# Patient Record
Sex: MALE | Race: WHITE | NOT HISPANIC OR LATINO | URBAN - METROPOLITAN AREA
[De-identification: names, ages, dates, MRNs, and addresses within clinical notes are randomized per-mention and may not be internally consistent; named-entity substitution may affect disease eponyms.]

---

## 2017-01-01 ENCOUNTER — COMMUNICATION - HEALTHEAST (OUTPATIENT)
Dept: FAMILY MEDICINE | Facility: CLINIC | Age: 82
End: 2017-01-01

## 2017-01-01 DIAGNOSIS — I10 ESSENTIAL HYPERTENSION, BENIGN: ICD-10-CM

## 2017-01-06 ENCOUNTER — RECORDS - HEALTHEAST (OUTPATIENT)
Dept: ADMINISTRATIVE | Facility: OTHER | Age: 82
End: 2017-01-06

## 2017-01-25 ENCOUNTER — COMMUNICATION - HEALTHEAST (OUTPATIENT)
Dept: FAMILY MEDICINE | Facility: CLINIC | Age: 82
End: 2017-01-25

## 2017-01-25 DIAGNOSIS — E78.5 HYPERLIPIDEMIA: ICD-10-CM

## 2017-03-17 ENCOUNTER — AMBULATORY - HEALTHEAST (OUTPATIENT)
Dept: FAMILY MEDICINE | Facility: CLINIC | Age: 82
End: 2017-03-17

## 2017-03-17 ENCOUNTER — OFFICE VISIT - HEALTHEAST (OUTPATIENT)
Dept: FAMILY MEDICINE | Facility: CLINIC | Age: 82
End: 2017-03-17

## 2017-03-17 DIAGNOSIS — R01.1 HEART MURMUR: ICD-10-CM

## 2017-03-17 DIAGNOSIS — Z00.00 ROUTINE GENERAL MEDICAL EXAMINATION AT A HEALTH CARE FACILITY: ICD-10-CM

## 2017-03-17 DIAGNOSIS — E11.9 DIABETES MELLITUS (H): ICD-10-CM

## 2017-03-17 DIAGNOSIS — R09.89 RESPIRATORY CRACKLES AT LEFT LUNG BASE: ICD-10-CM

## 2017-03-17 DIAGNOSIS — I10 ESSENTIAL HYPERTENSION, BENIGN: ICD-10-CM

## 2017-03-17 DIAGNOSIS — M40.202 CERVICAL KYPHOSIS: ICD-10-CM

## 2017-03-17 DIAGNOSIS — E78.5 HYPERLIPIDEMIA: ICD-10-CM

## 2017-03-17 LAB
CHOLEST SERPL-MCNC: 149 MG/DL
FASTING STATUS PATIENT QL REPORTED: YES
HBA1C MFR BLD: 7.3 % (ref 3.5–6)
HDLC SERPL-MCNC: 34 MG/DL
LDLC SERPL CALC-MCNC: 96 MG/DL
TRIGL SERPL-MCNC: 95 MG/DL

## 2017-03-17 ASSESSMENT — MIFFLIN-ST. JEOR: SCORE: 1374.28

## 2017-03-18 ENCOUNTER — AMBULATORY - HEALTHEAST (OUTPATIENT)
Dept: FAMILY MEDICINE | Facility: CLINIC | Age: 82
End: 2017-03-18

## 2017-03-18 ENCOUNTER — COMMUNICATION - HEALTHEAST (OUTPATIENT)
Dept: FAMILY MEDICINE | Facility: CLINIC | Age: 82
End: 2017-03-18

## 2017-03-18 DIAGNOSIS — F32.A DEPRESSION: ICD-10-CM

## 2017-03-18 DIAGNOSIS — I10 ESSENTIAL HYPERTENSION, BENIGN: ICD-10-CM

## 2017-03-21 ENCOUNTER — COMMUNICATION - HEALTHEAST (OUTPATIENT)
Dept: FAMILY MEDICINE | Facility: CLINIC | Age: 82
End: 2017-03-21

## 2017-03-29 ENCOUNTER — COMMUNICATION - HEALTHEAST (OUTPATIENT)
Dept: FAMILY MEDICINE | Facility: CLINIC | Age: 82
End: 2017-03-29

## 2017-03-29 ENCOUNTER — HOSPITAL ENCOUNTER (OUTPATIENT)
Dept: CARDIOLOGY | Facility: CLINIC | Age: 82
Discharge: HOME | End: 2017-03-29
Attending: FAMILY MEDICINE

## 2017-03-29 DIAGNOSIS — R01.1 HEART MURMUR: ICD-10-CM

## 2017-03-29 LAB
AORTIC ROOT: 3.3 CM
AORTIC VALVE MEAN VELOCITY: 185 CM/S
AV DIMENSIONLESS INDEX VTI: 0.4
AV MEAN GRADIENT: 16 MMHG
AV PEAK GRADIENT: 29.4 MMHG
AV VALVE AREA: 1.4 CM2
AV VELOCITY RATIO: 0.3
BSA FOR ECHO PROCEDURE: 1.87 M2
CV BLOOD PRESSURE: NORMAL MMHG
CV ECHO HEIGHT: 73 IN
CV ECHO WEIGHT: 150 LBS
DOP CALC AO PEAK VEL: 271 CM/S
DOP CALC AO VTI: 71.3 CM
DOP CALC LVOT AREA: 3.8 CM2
DOP CALC LVOT DIAMETER: 2.2 CM
DOP CALC LVOT PEAK VEL: 93.1 CM/S
DOP CALC LVOT STROKE VOLUME: 102.6 CM3
DOP CALCLVOT PEAK VEL VTI: 27 CM
ECHO EJECTION FRACTION ESTIMATED: 55 %
EJECTION FRACTION: 50 % (ref 55–75)
FRACTIONAL SHORTENING: 40.6 % (ref 28–44)
INTERVENTRICULAR SEPTUM IN END DIASTOLE: 1.24 CM (ref 0.6–1)
IVS/PW RATIO: 1
LA AREA 1: 31 CM2
LA AREA 2: 16.7 CM2
LEFT ATRIUM LENGTH: 4.86 CM
LEFT ATRIUM SIZE: 2.9 CM
LEFT ATRIUM VOLUME INDEX: 48.4 ML/M2
LEFT ATRIUM VOLUME: 90.5 CM3
LEFT VENTRICLE CARDIAC INDEX: 3.3 L/MIN/M2
LEFT VENTRICLE CARDIAC OUTPUT: 6.2 L/MIN
LEFT VENTRICLE DIASTOLIC VOLUME INDEX: 39.6 CM3/M2 (ref 34–74)
LEFT VENTRICLE DIASTOLIC VOLUME: 74 CM3 (ref 62–150)
LEFT VENTRICLE HEART RATE: 60 BPM
LEFT VENTRICLE MASS INDEX: 113.6 G/M2
LEFT VENTRICLE SYSTOLIC VOLUME INDEX: 19.8 CM3/M2 (ref 11–31)
LEFT VENTRICLE SYSTOLIC VOLUME: 37 CM3 (ref 21–61)
LEFT VENTRICULAR INTERNAL DIMENSION IN DIASTOLE: 4.48 CM (ref 4.2–5.8)
LEFT VENTRICULAR INTERNAL DIMENSION IN SYSTOLE: 2.66 CM (ref 2.5–4)
LEFT VENTRICULAR MASS: 212.4 G
LEFT VENTRICULAR OUTFLOW TRACT MEAN GRADIENT: 2 MMHG
LEFT VENTRICULAR OUTFLOW TRACT MEAN VELOCITY: 64.4 CM/S
LEFT VENTRICULAR OUTFLOW TRACT PEAK GRADIENT: 3 MMHG
LEFT VENTRICULAR POSTERIOR WALL IN END DIASTOLE: 1.29 CM (ref 0.6–1)
LV STROKE VOLUME INDEX: 54.9 ML/M2
MITRAL VALVE E/A RATIO: 0.8
MV AVERAGE E/E' RATIO: 20.5 CM/S
MV DECELERATION TIME: 408 MS
MV E'TISSUE VEL-LAT: 6.82 CM/S
MV E'TISSUE VEL-MED: 3.12 CM/S
MV LATERAL E/E' RATIO: 15
MV MEDIAL E/E' RATIO: 32.7
MV PEAK A VELOCITY: 128 CM/S
MV PEAK E VELOCITY: 102 CM/S
NUC REST DIASTOLIC VOLUME INDEX: 2400 LBS
NUC REST SYSTOLIC VOLUME INDEX: 73 IN
TRICUSPID REGURGITATION PEAK PRESSURE GRADIENT: 31.8 MMHG
TRICUSPID VALVE PEAK REGURGITANT VELOCITY: 282 CM/S

## 2017-03-29 ASSESSMENT — MIFFLIN-ST. JEOR: SCORE: 1374.28

## 2017-03-30 ENCOUNTER — COMMUNICATION - HEALTHEAST (OUTPATIENT)
Dept: FAMILY MEDICINE | Facility: CLINIC | Age: 82
End: 2017-03-30

## 2017-04-23 ENCOUNTER — COMMUNICATION - HEALTHEAST (OUTPATIENT)
Dept: FAMILY MEDICINE | Facility: CLINIC | Age: 82
End: 2017-04-23

## 2017-04-23 DIAGNOSIS — E78.5 HYPERLIPIDEMIA: ICD-10-CM

## 2017-05-18 ENCOUNTER — COMMUNICATION - HEALTHEAST (OUTPATIENT)
Dept: FAMILY MEDICINE | Facility: CLINIC | Age: 82
End: 2017-05-18

## 2017-06-13 ENCOUNTER — OFFICE VISIT - HEALTHEAST (OUTPATIENT)
Dept: FAMILY MEDICINE | Facility: CLINIC | Age: 82
End: 2017-06-13

## 2017-06-13 DIAGNOSIS — E11.42 DIABETIC PERIPHERAL NEUROPATHY ASSOCIATED WITH TYPE 2 DIABETES MELLITUS (H): ICD-10-CM

## 2017-06-13 DIAGNOSIS — E78.5 HYPERLIPIDEMIA: ICD-10-CM

## 2017-06-13 DIAGNOSIS — M40.202 CERVICAL KYPHOSIS: ICD-10-CM

## 2017-06-13 DIAGNOSIS — I10 ESSENTIAL HYPERTENSION, BENIGN: ICD-10-CM

## 2017-06-13 DIAGNOSIS — E11.9 DIABETES MELLITUS (H): ICD-10-CM

## 2017-06-13 LAB
CHOLEST SERPL-MCNC: 148 MG/DL
HBA1C MFR BLD: 7.2 % (ref 3.5–6)
LDLC SERPL CALC-MCNC: 94 MG/DL

## 2017-06-14 ENCOUNTER — COMMUNICATION - HEALTHEAST (OUTPATIENT)
Dept: FAMILY MEDICINE | Facility: CLINIC | Age: 82
End: 2017-06-14

## 2017-07-04 ENCOUNTER — COMMUNICATION - HEALTHEAST (OUTPATIENT)
Dept: FAMILY MEDICINE | Facility: CLINIC | Age: 82
End: 2017-07-04

## 2017-07-04 DIAGNOSIS — I10 ESSENTIAL HYPERTENSION, BENIGN: ICD-10-CM

## 2017-07-11 ENCOUNTER — COMMUNICATION - HEALTHEAST (OUTPATIENT)
Dept: FAMILY MEDICINE | Facility: CLINIC | Age: 82
End: 2017-07-11

## 2017-07-11 DIAGNOSIS — I10 BENIGN ESSENTIAL HYPERTENSION: ICD-10-CM

## 2017-07-28 ENCOUNTER — COMMUNICATION - HEALTHEAST (OUTPATIENT)
Dept: FAMILY MEDICINE | Facility: CLINIC | Age: 82
End: 2017-07-28

## 2017-08-11 ENCOUNTER — TRANSFERRED RECORDS (OUTPATIENT)
Dept: HEALTH INFORMATION MANAGEMENT | Facility: CLINIC | Age: 82
End: 2017-08-11

## 2017-08-14 ENCOUNTER — COMMUNICATION - HEALTHEAST (OUTPATIENT)
Dept: FAMILY MEDICINE | Facility: CLINIC | Age: 82
End: 2017-08-14

## 2017-08-14 DIAGNOSIS — G62.9 NEUROPATHY: ICD-10-CM

## 2017-09-05 ENCOUNTER — RECORDS - HEALTHEAST (OUTPATIENT)
Dept: ADMINISTRATIVE | Facility: OTHER | Age: 82
End: 2017-09-05

## 2017-09-05 ENCOUNTER — COMMUNICATION - HEALTHEAST (OUTPATIENT)
Dept: FAMILY MEDICINE | Facility: CLINIC | Age: 82
End: 2017-09-05

## 2017-09-14 ENCOUNTER — RECORDS - HEALTHEAST (OUTPATIENT)
Dept: ADMINISTRATIVE | Facility: OTHER | Age: 82
End: 2017-09-14

## 2017-09-19 ENCOUNTER — RECORDS - HEALTHEAST (OUTPATIENT)
Dept: ADMINISTRATIVE | Facility: OTHER | Age: 82
End: 2017-09-19

## 2017-09-25 ENCOUNTER — COMMUNICATION - HEALTHEAST (OUTPATIENT)
Dept: FAMILY MEDICINE | Facility: CLINIC | Age: 82
End: 2017-09-25

## 2017-09-25 DIAGNOSIS — E11.9 TYPE 2 DIABETES MELLITUS (H): ICD-10-CM

## 2017-09-26 ENCOUNTER — COMMUNICATION - HEALTHEAST (OUTPATIENT)
Dept: FAMILY MEDICINE | Facility: CLINIC | Age: 82
End: 2017-09-26

## 2017-09-26 DIAGNOSIS — E11.9 TYPE 2 DIABETES MELLITUS (H): ICD-10-CM

## 2017-09-27 ENCOUNTER — OFFICE VISIT - HEALTHEAST (OUTPATIENT)
Dept: FAMILY MEDICINE | Facility: CLINIC | Age: 82
End: 2017-09-27

## 2017-09-27 DIAGNOSIS — E78.5 HYPERLIPIDEMIA: ICD-10-CM

## 2017-09-27 DIAGNOSIS — E11.42 DIABETIC PERIPHERAL NEUROPATHY ASSOCIATED WITH TYPE 2 DIABETES MELLITUS (H): ICD-10-CM

## 2017-09-27 DIAGNOSIS — E11.9 DIABETES MELLITUS (H): ICD-10-CM

## 2017-09-27 DIAGNOSIS — I10 ESSENTIAL HYPERTENSION, BENIGN: ICD-10-CM

## 2017-09-27 LAB
CHOLEST SERPL-MCNC: 162 MG/DL
FASTING STATUS PATIENT QL REPORTED: YES
HBA1C MFR BLD: 6.5 % (ref 3.5–6)
HDLC SERPL-MCNC: 32 MG/DL
LDLC SERPL CALC-MCNC: 104 MG/DL
TRIGL SERPL-MCNC: 128 MG/DL

## 2017-09-27 ASSESSMENT — MIFFLIN-ST. JEOR: SCORE: 1317.57

## 2017-10-03 ENCOUNTER — RECORDS - HEALTHEAST (OUTPATIENT)
Dept: ADMINISTRATIVE | Facility: OTHER | Age: 82
End: 2017-10-03

## 2017-11-12 ENCOUNTER — COMMUNICATION - HEALTHEAST (OUTPATIENT)
Dept: FAMILY MEDICINE | Facility: CLINIC | Age: 82
End: 2017-11-12

## 2017-11-13 ENCOUNTER — COMMUNICATION - HEALTHEAST (OUTPATIENT)
Dept: FAMILY MEDICINE | Facility: CLINIC | Age: 82
End: 2017-11-13

## 2017-11-13 DIAGNOSIS — G62.9 NEUROPATHY: ICD-10-CM

## 2017-11-24 ENCOUNTER — COMMUNICATION - HEALTHEAST (OUTPATIENT)
Dept: FAMILY MEDICINE | Facility: CLINIC | Age: 82
End: 2017-11-24

## 2017-11-24 DIAGNOSIS — I10 ESSENTIAL HYPERTENSION, BENIGN: ICD-10-CM

## 2017-12-16 ENCOUNTER — COMMUNICATION - HEALTHEAST (OUTPATIENT)
Dept: FAMILY MEDICINE | Facility: CLINIC | Age: 82
End: 2017-12-16

## 2017-12-16 DIAGNOSIS — I10 ESSENTIAL HYPERTENSION, BENIGN: ICD-10-CM

## 2017-12-21 ENCOUNTER — RECORDS - HEALTHEAST (OUTPATIENT)
Dept: ADMINISTRATIVE | Facility: OTHER | Age: 82
End: 2017-12-21

## 2017-12-21 ENCOUNTER — COMMUNICATION - HEALTHEAST (OUTPATIENT)
Dept: FAMILY MEDICINE | Facility: CLINIC | Age: 82
End: 2017-12-21

## 2017-12-21 DIAGNOSIS — E11.9 DIABETES (H): ICD-10-CM

## 2017-12-27 ENCOUNTER — COMMUNICATION - HEALTHEAST (OUTPATIENT)
Dept: FAMILY MEDICINE | Facility: CLINIC | Age: 82
End: 2017-12-27

## 2017-12-27 DIAGNOSIS — I10 HTN (HYPERTENSION): ICD-10-CM

## 2018-01-17 ENCOUNTER — COMMUNICATION - HEALTHEAST (OUTPATIENT)
Dept: FAMILY MEDICINE | Facility: CLINIC | Age: 83
End: 2018-01-17

## 2018-01-17 DIAGNOSIS — E78.5 HYPERLIPIDEMIA: ICD-10-CM

## 2018-02-01 ENCOUNTER — OFFICE VISIT - HEALTHEAST (OUTPATIENT)
Dept: FAMILY MEDICINE | Facility: CLINIC | Age: 83
End: 2018-02-01

## 2018-02-01 DIAGNOSIS — E78.5 HYPERLIPIDEMIA: ICD-10-CM

## 2018-02-01 DIAGNOSIS — I35.0 AORTIC STENOSIS: ICD-10-CM

## 2018-02-01 DIAGNOSIS — E11.9 DIABETES MELLITUS (H): ICD-10-CM

## 2018-02-01 DIAGNOSIS — I10 ESSENTIAL HYPERTENSION, BENIGN: ICD-10-CM

## 2018-02-01 LAB
ALBUMIN SERPL-MCNC: 3.5 G/DL (ref 3.5–5)
ALP SERPL-CCNC: 90 U/L (ref 45–120)
ALT SERPL W P-5'-P-CCNC: 11 U/L (ref 0–45)
ANION GAP SERPL CALCULATED.3IONS-SCNC: 12 MMOL/L (ref 5–18)
AST SERPL W P-5'-P-CCNC: 19 U/L (ref 0–40)
BILIRUB DIRECT SERPL-MCNC: 0.2 MG/DL
BILIRUB SERPL-MCNC: 0.6 MG/DL (ref 0–1)
BUN SERPL-MCNC: 15 MG/DL (ref 8–28)
CALCIUM SERPL-MCNC: 9.6 MG/DL (ref 8.5–10.5)
CHLORIDE BLD-SCNC: 103 MMOL/L (ref 98–107)
CHOLEST SERPL-MCNC: 157 MG/DL
CO2 SERPL-SCNC: 27 MMOL/L (ref 22–31)
CREAT SERPL-MCNC: 0.83 MG/DL (ref 0.7–1.3)
FASTING STATUS PATIENT QL REPORTED: YES
GFR SERPL CREATININE-BSD FRML MDRD: >60 ML/MIN/1.73M2
GLUCOSE BLD-MCNC: 117 MG/DL (ref 70–125)
HBA1C MFR BLD: 7.5 % (ref 3.5–6)
HDLC SERPL-MCNC: 32 MG/DL
LDLC SERPL CALC-MCNC: 100 MG/DL
POTASSIUM BLD-SCNC: 3.9 MMOL/L (ref 3.5–5)
PROT SERPL-MCNC: 6.7 G/DL (ref 6–8)
SODIUM SERPL-SCNC: 142 MMOL/L (ref 136–145)
TRIGL SERPL-MCNC: 125 MG/DL

## 2018-02-01 ASSESSMENT — MIFFLIN-ST. JEOR: SCORE: 1297.17

## 2018-02-02 ENCOUNTER — COMMUNICATION - HEALTHEAST (OUTPATIENT)
Dept: FAMILY MEDICINE | Facility: CLINIC | Age: 83
End: 2018-02-02

## 2018-02-09 ENCOUNTER — COMMUNICATION - HEALTHEAST (OUTPATIENT)
Dept: FAMILY MEDICINE | Facility: CLINIC | Age: 83
End: 2018-02-09

## 2018-02-09 DIAGNOSIS — G62.9 NEUROPATHY: ICD-10-CM

## 2018-02-20 ENCOUNTER — COMMUNICATION - HEALTHEAST (OUTPATIENT)
Dept: FAMILY MEDICINE | Facility: CLINIC | Age: 83
End: 2018-02-20

## 2018-02-20 DIAGNOSIS — I10 ESSENTIAL HYPERTENSION, BENIGN: ICD-10-CM

## 2018-02-22 ENCOUNTER — RECORDS - HEALTHEAST (OUTPATIENT)
Dept: ADMINISTRATIVE | Facility: OTHER | Age: 83
End: 2018-02-22

## 2018-02-22 ENCOUNTER — TRANSFERRED RECORDS (OUTPATIENT)
Dept: HEALTH INFORMATION MANAGEMENT | Facility: CLINIC | Age: 83
End: 2018-02-22

## 2018-02-28 ENCOUNTER — COMMUNICATION - HEALTHEAST (OUTPATIENT)
Dept: FAMILY MEDICINE | Facility: CLINIC | Age: 83
End: 2018-02-28

## 2018-02-28 DIAGNOSIS — I10 ESSENTIAL HYPERTENSION, BENIGN: ICD-10-CM

## 2018-03-01 ENCOUNTER — RECORDS - HEALTHEAST (OUTPATIENT)
Dept: ADMINISTRATIVE | Facility: OTHER | Age: 83
End: 2018-03-01

## 2018-03-01 ENCOUNTER — TRANSFERRED RECORDS (OUTPATIENT)
Dept: HEALTH INFORMATION MANAGEMENT | Facility: CLINIC | Age: 83
End: 2018-03-01

## 2018-03-02 ENCOUNTER — TRANSFERRED RECORDS (OUTPATIENT)
Dept: HEALTH INFORMATION MANAGEMENT | Facility: CLINIC | Age: 83
End: 2018-03-02

## 2018-03-02 ENCOUNTER — TELEPHONE (OUTPATIENT)
Dept: OPHTHALMOLOGY | Facility: CLINIC | Age: 83
End: 2018-03-02

## 2018-03-02 NOTE — TELEPHONE ENCOUNTER
[facility] referring for urgent evaluation  Notes faxed today    Message to glaucoma team for review of notes once received and help with scheduling    Angelo Izaguirre RN 4:23 PM 03/02/18

## 2018-03-03 ENCOUNTER — COMMUNICATION - HEALTHEAST (OUTPATIENT)
Dept: FAMILY MEDICINE | Facility: CLINIC | Age: 83
End: 2018-03-03

## 2018-03-03 DIAGNOSIS — F32.A DEPRESSION: ICD-10-CM

## 2018-03-08 ENCOUNTER — OFFICE VISIT (OUTPATIENT)
Dept: OPHTHALMOLOGY | Facility: CLINIC | Age: 83
End: 2018-03-08
Attending: OPHTHALMOLOGY
Payer: COMMERCIAL

## 2018-03-08 DIAGNOSIS — H40.1112 PRIMARY OPEN ANGLE GLAUCOMA OF RIGHT EYE, MODERATE STAGE: ICD-10-CM

## 2018-03-08 DIAGNOSIS — H25.12 NUCLEAR SENILE CATARACT OF LEFT EYE: ICD-10-CM

## 2018-03-08 DIAGNOSIS — H40.1190 POAG (PRIMARY OPEN-ANGLE GLAUCOMA): ICD-10-CM

## 2018-03-08 DIAGNOSIS — Z96.1 PSEUDOPHAKIA OF RIGHT EYE: ICD-10-CM

## 2018-03-08 DIAGNOSIS — H40.52X3 NEOVASCULAR GLAUCOMA OF LEFT EYE, SEVERE STAGE: Primary | ICD-10-CM

## 2018-03-08 PROCEDURE — 76519 ECHO EXAM OF EYE: CPT | Mod: ZF | Performed by: OPHTHALMOLOGY

## 2018-03-08 PROCEDURE — G0463 HOSPITAL OUTPT CLINIC VISIT: HCPCS | Mod: ZF

## 2018-03-08 PROCEDURE — 92133 CPTRZD OPH DX IMG PST SGM ON: CPT | Mod: ZF | Performed by: OPHTHALMOLOGY

## 2018-03-08 PROCEDURE — 92083 EXTENDED VISUAL FIELD XM: CPT | Mod: ZF | Performed by: OPHTHALMOLOGY

## 2018-03-08 RX ORDER — BRIMONIDINE TARTRATE 1 MG/ML
1 SOLUTION/ DROPS OPHTHALMIC 3 TIMES DAILY
COMMUNITY

## 2018-03-08 RX ORDER — PAROXETINE HYDROCHLORIDE HEMIHYDRATE 25 MG/1
TABLET, FILM COATED, EXTENDED RELEASE ORAL EVERY MORNING
COMMUNITY

## 2018-03-08 RX ORDER — TIMOLOL MALEATE 5 MG/ML
1 SOLUTION OPHTHALMIC EVERY MORNING
COMMUNITY

## 2018-03-08 RX ORDER — LATANOPROST 50 UG/ML
1 SOLUTION/ DROPS OPHTHALMIC AT BEDTIME
COMMUNITY

## 2018-03-08 RX ORDER — HYDROCHLOROTHIAZIDE 12.5 MG/1
12.5 CAPSULE ORAL DAILY
COMMUNITY

## 2018-03-08 ASSESSMENT — CONF VISUAL FIELD
OS_SUPERIOR_TEMPORAL_RESTRICTION: 1
OS_INFERIOR_TEMPORAL_RESTRICTION: 1
OS_INFERIOR_NASAL_RESTRICTION: 1
OS_SUPERIOR_NASAL_RESTRICTION: 1

## 2018-03-08 ASSESSMENT — VISUAL ACUITY
OS_CC: LP
OD_CC: 20/30
METHOD: SNELLEN - LINEAR

## 2018-03-08 ASSESSMENT — REFRACTION_MANIFEST
OD_CYLINDER: +1.25
OD_SPHERE: -1.25
OD_AXIS: 130

## 2018-03-08 ASSESSMENT — TONOMETRY
IOP_METHOD: APPLANATION
OD_IOP_MMHG: 18
OS_IOP_MMHG: 24

## 2018-03-08 ASSESSMENT — REFRACTION_WEARINGRX
OS_ADD: +2.75
OD_SPHERE: -1.00
OD_CYLINDER: +1.00
OD_AXIS: 180
OS_AXIS: 016
OS_SPHERE: -0.75
OD_ADD: +2.75
OS_CYLINDER: +1.50

## 2018-03-08 ASSESSMENT — SLIT LAMP EXAM - LIDS
COMMENTS: NORMAL
COMMENTS: NORMAL

## 2018-03-08 ASSESSMENT — CUP TO DISC RATIO: OD_RATIO: 0.9

## 2018-03-08 NOTE — PROGRESS NOTES
1)NVG OS/POAG OD --  K pachy:    Tmax:     HVF: OD:?early sup arcaute and Nasal dec sens on first field  And OS:unable     CDR: OD:0.9     HRT/OCT: OD:Mild RNFL thinning       FHX of Glc: Brother -- possible tube surgery, on gtts       Gonio:       Intolerant to: Diamox -- balance issues (self d/c in 2018, unable to tolerate)      Asthma/COPD: No, tolerating topical and po BB   Steroid Use: No, s/p Ozrudex in past    Kidney Stones:  No   Sulfa Allergy: No, UNABLE TO TOLERATE DIAMOX     IOP targets: -- IOP borderline/above target OS -- consider observation, mCPC OS, or phaco/tube OS -- rec phaco/tube OS  2)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 Omer note from [facility] (rec CE OS to improve view and management of retinal condition)  3)DM c Mod NPDR -- following at [facility]  4)PCIOL OD -- following with Dr. Garcia at [facility] eye clinic   5)Mature NS OS -- will require Trypan and possible hooks -- +Florid NVI, ?regressed -- ?visual potential -- will need old notes    MD:pt turns 89 yo next week and wants to defer surgery until after his birthday -- daughter accompanied him to office visit       Will obtain old notes from [facility] eye clinic and [facility] and old visual field tests.  Patient will continue on Timolol which is a yellow top drop in the morning in the LEFT EYE ONLY and Latanoprost which is a teal top drop at bedtime in BOTH EYES, and Alphagan (Brimonidine) which is a purple top drop 3x/day (8 hours apart) in the LEFT EYE ONLY.  A long discussion of the risks, benefits, and alternatives including potential treatment and management options were had with patient and a decision was made to think about options prior to proceeding with cataract and tube surgery in the left eye.      If patient declines surgery he will return to clinic in 1-2 months with pachymetry, gonioscopy, B scan left eye only, repeat IOP check and repeat discussion about possible combined cataract and tube surgery on the  left eye.    Attending Physician Attestation:  Complete documentation of historical and exam elements from today's encounter can be found in the full encounter summary report (not reduplicated in this progress note). I personally obtained the chief complaint(s) and history of present illness.  I confirmed and edited as necessary the review of systems, past medical/surgical history, family history, social history, and examination findings as documented by others; and I examined the patient myself. I personally reviewed the relevant tests, images, and reports as documented above. I formulated and edited as necessary the assessment and plan and discussed the findings and management plan with the patient and family.  - Irma Burgos MD

## 2018-03-08 NOTE — PATIENT INSTRUCTIONS
Will obtain old notes from [facility] eye clinic and VRS and old visual field tests.  Patient will continue on Timolol which is a yellow top drop in the morning in the LEFT EYE ONLY and Latanoprost which is a teal top drop at bedtime in BOTH EYES, and Alphagan (Brimonidine) which is a purple top drop 3x/day (8 hours apart) in the LEFT EYE ONLY.  A long discussion of the risks, benefits, and alternatives including potential treatment and management options were had with patient and a decision was made to think about options prior to proceeding with cataract and tube surgery in the left eye.      If patient declines surgery he will return to clinic in 1-2 months with B scan and repeat IOP check.

## 2018-03-08 NOTE — LETTER
March 8, 2018      Shane Garcia MD  [facility]  [address], [address]  [place] [address]  Fax: [phone]       RE: Wicho Zhao  [address] [facility] [address]       Dear Dr. Garcia:     Thank you for referring your patient, Wicho Zhao, to the EYE CLINIC at [facility]. Please see a copy of my visit note below.    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 Omer note from [facility] (rec CE OS to improve view and management of retinal condition)  3)DM c Mod NPDR -- following at [facility]  4)PCIOL OD -- following with Dr. Garcia at [facility] eye clinic   5)Mature NS OS -- will require Trypan and possible hooks -- +Florid NVI, ?regressed -- ?visual potential -- will need old notes    Patient turns 89 yo next week and wants to defer surgery until after his birthday -- daughter accompanied him to office visit.           Plan:  Will obtain old notes from [facility] eye clinic and [facility] and old visual field tests.  Patient will continue on Timolol which is a yellow top drop in the morning in the LEFT EYE ONLY and Latanoprost which is a teal top drop at bedtime in BOTH EYES, and Alphagan (Brimonidine) which is a purple top drop 3x/day (8 hours apart) in the LEFT EYE ONLY.  A long discussion of the risks, benefits, and alternatives including potential treatment and  management options were had with patient and a decision was made to think about options prior to proceeding with cataract and tube surgery in the left eye.      If patient declines surgery he will return to clinic in 1-2 months with pachymetry, gonioscopy, B scan left eye only, repeat IOP check and repeat discussion about possible combined cataract and tube surgery on the left eye.      Again, thank you for allowing me to participate in the care of your patient.        Sincerely,      Irma Burgos MD

## 2018-03-08 NOTE — NURSING NOTE
Chief Complaints and History of Present Illnesses   Patient presents with     Consult For     Glaucoma      HPI    Additional Referring Providers:  Dr. Radha SAM [facility]   Symptoms:     Blurred vision   Decreased vision      Unknown duration    Frequency:  Constant       Do you have eye pain now?:  No      Comments:  Pt sent here at request of Dr. Radha SAM [facility]. Pt has longstanding history of Glaucoma diagnosed 10 years ago. Pt told he needed to have IOL LE along with Glaucoma surgery. Notes that he does have Avastin Injection LE from  at [facility] for old CRVO. BE feeling good and comfortable. MELI KAPADIA, COA 10:29 AM 03/08/2018

## 2018-03-08 NOTE — MR AVS SNAPSHOT
After Visit Summary   3/8/2018    Wicho Zhao    MRN: [id number]           Patient Information     Date Of Birth          3/18/1927        Visit Information        Provider Department      3/8/2018 10:00 AM Irma Burgos MD Eye Clinic        Today's Diagnoses     Neovascular glaucoma of left eye, severe stage    -  1    POAG (primary open-angle glaucoma)        Primary open angle glaucoma of right eye, moderate stage        Pseudophakia of right eye        Nuclear senile cataract of left eye          Care Instructions    Will obtain old notes from [facility] eye clinic and VRS and old visual field tests.  Patient will continue on Timolol which is a yellow top drop in the morning in the LEFT EYE ONLY and Latanoprost which is a teal top drop at bedtime in BOTH EYES, and Alphagan (Brimonidine) which is a purple top drop 3x/day (8 hours apart) in the LEFT EYE ONLY.  A long discussion of the risks, benefits, and alternatives including potential treatment and management options were had with patient and a decision was made to think about options prior to proceeding with cataract and tube surgery in the left eye.      If patient declines surgery he will return to clinic in 1-2 months with B scan and repeat IOP check.          Follow-ups after your visit        Follow-up notes from your care team     Return 1-2 months with B scan and repeat IOP check..      Who to contact     Please call your clinic at [phone] to:    Ask questions about your health    Make or cancel appointments    Discuss your medicines    Learn about your test results    Speak to your doctor            Additional Information About Your Visit        Glarityhart Information     Privy is an electronic gateway that provides easy, online access to your medical records. With Privy, you can request a clinic appointment, read your test results, renew a prescription or communicate with your care team.     To sign up for Privy visit the  website at [email or web address]   You will be asked to enter the access code listed below, as well as some personal information. Please follow the directions to create your username and password.     Your access code is: [email or web address]  Expires: 2018  6:30 AM     Your access code will  in 90 days. If you need help or a new code, please contact your [facility] or call [phone] for assistance.        Care EveryWhere ID     This is your Care EveryWhere ID. This could be used by other organizations to access your [place] medical records  [id number]         Blood Pressure from Last 3 Encounters:   No data found for BP    Weight from Last 3 Encounters:   No data found for Wt              We Performed the Following     IOL Biometry w/ IOL calc OU (both eye)     OCT Optic Nerve RNFL Spectralis OU (both eyes)     OVF 24-2 Dynamic OD        Primary Care Provider Office Phone # Fax #    Parrish RYAN Ricardo [phone]       [facility] [address] [place] [address] [address] [place] [address]        Equal Access to Services     TYREL [facility] CenterAINSLEY : Hadii aad ku hadasho Soomaali, waaxda luqadaha, qaybta kaalmada adewojciech, deepali evans . So [facility] [phone].    ATENCIÓN: Si habla español, tiene a joaquin disposición servicios gratuitos de asistencia lingüística. Loretoame al [phone].    We comply with applicable federal civil rights laws and [place] laws. We do not discriminate on the basis of race, color, national origin, age, disability, sex, sexual orientation, or gender identity.            Thank you!     Thank you for choosing EYE CLINIC  for your care. Our goal is always to provide you with excellent care. Hearing back from our patients is one way we can continue to improve our services. Please take a few minutes to complete the written survey that you may receive in the mail after your visit with us. Thank you!             Your Updated  Medication List - Protect others around you: Learn how to safely use, store and throw away your medicines at www.disposemymeds.org.          This list is accurate as of 3/8/18 12:57 PM.  Always use your most recent med list.                   Brand Name Dispense Instructions for use Diagnosis    brimonidine 0.1 % ophthalmic solution    ALPHAGAN P     1 drop 3 times daily        GABAPENTIN PO           GLIPIZIDE PO           hydrochlorothiazide 12.5 MG capsule    MICROZIDE     Take 12.5 mg by mouth daily        latanoprost 0.005 % ophthalmic solution    XALATAN     1 drop At Bedtime        LISINOPRIL PO           LOSARTAN POTASSIUM PO           METOPROLOL TARTRATE PO           PARoxetine 25 MG 24 hr tablet    PAXIL-CR     Take by mouth every morning        POTASSIUM CITRATE PO           timolol 0.5 % ophthalmic gel-form    TIMOPTIC-XE     1 drop every morning        VERAPAMIL HCL PO

## 2018-03-15 ENCOUNTER — RECORDS - HEALTHEAST (OUTPATIENT)
Dept: ADMINISTRATIVE | Facility: OTHER | Age: 83
End: 2018-03-15

## 2018-03-15 ENCOUNTER — TRANSFERRED RECORDS (OUTPATIENT)
Dept: HEALTH INFORMATION MANAGEMENT | Facility: CLINIC | Age: 83
End: 2018-03-15

## 2018-03-23 ENCOUNTER — COMMUNICATION - HEALTHEAST (OUTPATIENT)
Dept: FAMILY MEDICINE | Facility: CLINIC | Age: 83
End: 2018-03-23

## 2018-03-23 DIAGNOSIS — I10 ESSENTIAL HYPERTENSION, BENIGN: ICD-10-CM

## 2018-03-30 ENCOUNTER — COMMUNICATION - HEALTHEAST (OUTPATIENT)
Dept: FAMILY MEDICINE | Facility: CLINIC | Age: 83
End: 2018-03-30

## 2018-03-30 DIAGNOSIS — E11.9 TYPE 2 DIABETES MELLITUS (H): ICD-10-CM

## 2018-04-05 ENCOUNTER — COMMUNICATION - HEALTHEAST (OUTPATIENT)
Dept: FAMILY MEDICINE | Facility: CLINIC | Age: 83
End: 2018-04-05

## 2018-04-05 DIAGNOSIS — I10 HTN (HYPERTENSION): ICD-10-CM

## 2018-06-18 ENCOUNTER — COMMUNICATION - HEALTHEAST (OUTPATIENT)
Dept: FAMILY MEDICINE | Facility: CLINIC | Age: 83
End: 2018-06-18

## 2018-06-18 DIAGNOSIS — I10 ESSENTIAL HYPERTENSION, BENIGN: ICD-10-CM

## 2018-06-20 ENCOUNTER — OFFICE VISIT - HEALTHEAST (OUTPATIENT)
Dept: FAMILY MEDICINE | Facility: CLINIC | Age: 83
End: 2018-06-20

## 2018-06-20 DIAGNOSIS — I10 ESSENTIAL HYPERTENSION, BENIGN: ICD-10-CM

## 2018-06-20 DIAGNOSIS — E78.5 HYPERLIPIDEMIA: ICD-10-CM

## 2018-06-20 DIAGNOSIS — F33.9 MAJOR DEPRESSIVE DISORDER, RECURRENT EPISODE (H): ICD-10-CM

## 2018-06-20 DIAGNOSIS — E11.42 DIABETIC PERIPHERAL NEUROPATHY ASSOCIATED WITH TYPE 2 DIABETES MELLITUS (H): ICD-10-CM

## 2018-06-20 DIAGNOSIS — E11.9 DIABETES MELLITUS (H): ICD-10-CM

## 2018-06-20 LAB
ALBUMIN SERPL-MCNC: 3.5 G/DL (ref 3.5–5)
ALP SERPL-CCNC: 82 U/L (ref 45–120)
ALT SERPL W P-5'-P-CCNC: 12 U/L (ref 0–45)
ANION GAP SERPL CALCULATED.3IONS-SCNC: 14 MMOL/L (ref 5–18)
AST SERPL W P-5'-P-CCNC: 18 U/L (ref 0–40)
BILIRUB DIRECT SERPL-MCNC: 0.2 MG/DL
BILIRUB SERPL-MCNC: 0.4 MG/DL (ref 0–1)
BUN SERPL-MCNC: 16 MG/DL (ref 8–28)
CALCIUM SERPL-MCNC: 9.8 MG/DL (ref 8.5–10.5)
CHLORIDE BLD-SCNC: 103 MMOL/L (ref 98–107)
CHOLEST SERPL-MCNC: 159 MG/DL
CO2 SERPL-SCNC: 26 MMOL/L (ref 22–31)
CREAT SERPL-MCNC: 0.89 MG/DL (ref 0.7–1.3)
FASTING STATUS PATIENT QL REPORTED: YES
GFR SERPL CREATININE-BSD FRML MDRD: >60 ML/MIN/1.73M2
GLUCOSE BLD-MCNC: 116 MG/DL (ref 70–125)
HBA1C MFR BLD: 7.5 % (ref 3.5–6)
HDLC SERPL-MCNC: 30 MG/DL
LDLC SERPL CALC-MCNC: 105 MG/DL
POTASSIUM BLD-SCNC: 3.8 MMOL/L (ref 3.5–5)
PROT SERPL-MCNC: 6.7 G/DL (ref 6–8)
SODIUM SERPL-SCNC: 143 MMOL/L (ref 136–145)
TRIGL SERPL-MCNC: 118 MG/DL

## 2018-06-20 ASSESSMENT — MIFFLIN-ST. JEOR: SCORE: 1307.37

## 2018-06-23 ENCOUNTER — COMMUNICATION - HEALTHEAST (OUTPATIENT)
Dept: FAMILY MEDICINE | Facility: CLINIC | Age: 83
End: 2018-06-23

## 2018-06-23 DIAGNOSIS — E78.5 HYPERLIPIDEMIA: ICD-10-CM

## 2018-06-25 ENCOUNTER — OFFICE VISIT - HEALTHEAST (OUTPATIENT)
Dept: FAMILY MEDICINE | Facility: CLINIC | Age: 83
End: 2018-06-25

## 2018-06-25 DIAGNOSIS — S01.311D EAR LOBE LACERATION, RIGHT, SUBSEQUENT ENCOUNTER: ICD-10-CM

## 2018-06-25 ASSESSMENT — MIFFLIN-ST. JEOR: SCORE: 1302.83

## 2018-06-27 ENCOUNTER — COMMUNICATION - HEALTHEAST (OUTPATIENT)
Dept: FAMILY MEDICINE | Facility: CLINIC | Age: 83
End: 2018-06-27

## 2018-06-27 DIAGNOSIS — E11.9 TYPE 2 DIABETES MELLITUS (H): ICD-10-CM

## 2018-06-27 DIAGNOSIS — I10 BENIGN ESSENTIAL HYPERTENSION: ICD-10-CM

## 2018-06-28 ENCOUNTER — OFFICE VISIT - HEALTHEAST (OUTPATIENT)
Dept: FAMILY MEDICINE | Facility: CLINIC | Age: 83
End: 2018-06-28

## 2018-06-28 DIAGNOSIS — Z51.89 VISIT FOR WOUND CHECK: ICD-10-CM

## 2018-06-28 DIAGNOSIS — Z48.02 VISIT FOR SUTURE REMOVAL: ICD-10-CM

## 2018-07-02 ENCOUNTER — OFFICE VISIT - HEALTHEAST (OUTPATIENT)
Dept: FAMILY MEDICINE | Facility: CLINIC | Age: 83
End: 2018-07-02

## 2018-07-02 DIAGNOSIS — Z48.02 VISIT FOR SUTURE REMOVAL: ICD-10-CM

## 2018-07-02 DIAGNOSIS — Z51.89 VISIT FOR WOUND CHECK: ICD-10-CM

## 2018-07-11 ENCOUNTER — OFFICE VISIT - HEALTHEAST (OUTPATIENT)
Dept: FAMILY MEDICINE | Facility: CLINIC | Age: 83
End: 2018-07-11

## 2018-07-11 DIAGNOSIS — Z51.89 VISIT FOR WOUND CHECK: ICD-10-CM

## 2018-07-11 DIAGNOSIS — E11.9 DIABETES MELLITUS (H): ICD-10-CM

## 2018-07-11 DIAGNOSIS — F33.9 MAJOR DEPRESSIVE DISORDER, RECURRENT EPISODE (H): ICD-10-CM

## 2018-08-01 ENCOUNTER — OFFICE VISIT - HEALTHEAST (OUTPATIENT)
Dept: FAMILY MEDICINE | Facility: CLINIC | Age: 83
End: 2018-08-01

## 2018-08-01 DIAGNOSIS — F33.9 MAJOR DEPRESSIVE DISORDER, RECURRENT EPISODE (H): ICD-10-CM

## 2018-08-31 ENCOUNTER — COMMUNICATION - HEALTHEAST (OUTPATIENT)
Dept: FAMILY MEDICINE | Facility: CLINIC | Age: 83
End: 2018-08-31

## 2018-08-31 DIAGNOSIS — F32.A DEPRESSION: ICD-10-CM

## 2018-09-06 ENCOUNTER — COMMUNICATION - HEALTHEAST (OUTPATIENT)
Dept: FAMILY MEDICINE | Facility: CLINIC | Age: 83
End: 2018-09-06

## 2018-09-06 DIAGNOSIS — I10 ESSENTIAL HYPERTENSION, BENIGN: ICD-10-CM

## 2018-09-24 ENCOUNTER — COMMUNICATION - HEALTHEAST (OUTPATIENT)
Dept: FAMILY MEDICINE | Facility: CLINIC | Age: 83
End: 2018-09-24

## 2018-09-24 DIAGNOSIS — E11.9 TYPE 2 DIABETES MELLITUS (H): ICD-10-CM

## 2018-10-16 ENCOUNTER — COMMUNICATION - HEALTHEAST (OUTPATIENT)
Dept: FAMILY MEDICINE | Facility: CLINIC | Age: 83
End: 2018-10-16

## 2018-10-17 ENCOUNTER — OFFICE VISIT - HEALTHEAST (OUTPATIENT)
Dept: FAMILY MEDICINE | Facility: CLINIC | Age: 83
End: 2018-10-17

## 2018-10-17 DIAGNOSIS — I10 ESSENTIAL HYPERTENSION, BENIGN: ICD-10-CM

## 2018-10-17 DIAGNOSIS — E11.9 DIABETES (H): ICD-10-CM

## 2018-10-17 DIAGNOSIS — E11.9 DIABETES MELLITUS (H): ICD-10-CM

## 2018-10-17 DIAGNOSIS — G62.9 NEUROPATHY: ICD-10-CM

## 2018-10-17 DIAGNOSIS — E78.5 HYPERLIPIDEMIA: ICD-10-CM

## 2018-10-17 DIAGNOSIS — E11.9 TYPE 2 DIABETES MELLITUS (H): ICD-10-CM

## 2018-10-17 LAB
ALBUMIN SERPL-MCNC: 3.4 G/DL (ref 3.5–5)
ALP SERPL-CCNC: 94 U/L (ref 45–120)
ALT SERPL W P-5'-P-CCNC: 9 U/L (ref 0–45)
ANION GAP SERPL CALCULATED.3IONS-SCNC: 10 MMOL/L (ref 5–18)
AST SERPL W P-5'-P-CCNC: 16 U/L (ref 0–40)
BILIRUB DIRECT SERPL-MCNC: 0.2 MG/DL
BILIRUB SERPL-MCNC: 0.5 MG/DL (ref 0–1)
BUN SERPL-MCNC: 11 MG/DL (ref 8–28)
CALCIUM SERPL-MCNC: 9.5 MG/DL (ref 8.5–10.5)
CHLORIDE BLD-SCNC: 102 MMOL/L (ref 98–107)
CHOLEST SERPL-MCNC: 152 MG/DL
CO2 SERPL-SCNC: 30 MMOL/L (ref 22–31)
CREAT SERPL-MCNC: 0.89 MG/DL (ref 0.7–1.3)
FASTING STATUS PATIENT QL REPORTED: YES
GFR SERPL CREATININE-BSD FRML MDRD: >60 ML/MIN/1.73M2
GLUCOSE BLD-MCNC: 118 MG/DL (ref 70–125)
HBA1C MFR BLD: 7.8 % (ref 3.5–6)
HDLC SERPL-MCNC: 29 MG/DL
LDLC SERPL CALC-MCNC: 97 MG/DL
POTASSIUM BLD-SCNC: 4 MMOL/L (ref 3.5–5)
PROT SERPL-MCNC: 6.3 G/DL (ref 6–8)
SODIUM SERPL-SCNC: 142 MMOL/L (ref 136–145)
TRIGL SERPL-MCNC: 130 MG/DL

## 2019-01-17 ENCOUNTER — OFFICE VISIT - HEALTHEAST (OUTPATIENT)
Dept: FAMILY MEDICINE | Facility: CLINIC | Age: 84
End: 2019-01-17

## 2019-01-17 DIAGNOSIS — I10 ESSENTIAL HYPERTENSION, BENIGN: ICD-10-CM

## 2019-01-17 DIAGNOSIS — E11.42 DIABETIC PERIPHERAL NEUROPATHY ASSOCIATED WITH TYPE 2 DIABETES MELLITUS (H): ICD-10-CM

## 2019-01-17 DIAGNOSIS — E78.2 MIXED HYPERLIPIDEMIA: ICD-10-CM

## 2019-01-17 LAB
ALBUMIN SERPL-MCNC: 3.8 G/DL (ref 3.5–5)
ALP SERPL-CCNC: 75 U/L (ref 45–120)
ALT SERPL W P-5'-P-CCNC: 10 U/L (ref 0–45)
ANION GAP SERPL CALCULATED.3IONS-SCNC: 10 MMOL/L (ref 5–18)
AST SERPL W P-5'-P-CCNC: 16 U/L (ref 0–40)
BILIRUB DIRECT SERPL-MCNC: 0.2 MG/DL
BILIRUB SERPL-MCNC: 0.5 MG/DL (ref 0–1)
BUN SERPL-MCNC: 16 MG/DL (ref 8–28)
CALCIUM SERPL-MCNC: 9.7 MG/DL (ref 8.5–10.5)
CHLORIDE BLD-SCNC: 103 MMOL/L (ref 98–107)
CHOLEST SERPL-MCNC: 155 MG/DL
CO2 SERPL-SCNC: 29 MMOL/L (ref 22–31)
CREAT SERPL-MCNC: 1.04 MG/DL (ref 0.7–1.3)
FASTING STATUS PATIENT QL REPORTED: YES
GFR SERPL CREATININE-BSD FRML MDRD: >60 ML/MIN/1.73M2
GLUCOSE BLD-MCNC: 118 MG/DL (ref 70–125)
HBA1C MFR BLD: 7.5 % (ref 3.5–6)
HDLC SERPL-MCNC: 34 MG/DL
LDLC SERPL CALC-MCNC: 98 MG/DL
POTASSIUM BLD-SCNC: 4.1 MMOL/L (ref 3.5–5)
PROT SERPL-MCNC: 6.8 G/DL (ref 6–8)
SODIUM SERPL-SCNC: 142 MMOL/L (ref 136–145)
TRIGL SERPL-MCNC: 113 MG/DL

## 2019-03-03 ENCOUNTER — COMMUNICATION - HEALTHEAST (OUTPATIENT)
Dept: FAMILY MEDICINE | Facility: CLINIC | Age: 84
End: 2019-03-03

## 2019-03-03 DIAGNOSIS — I10 ESSENTIAL HYPERTENSION, BENIGN: ICD-10-CM

## 2019-03-10 ENCOUNTER — COMMUNICATION - HEALTHEAST (OUTPATIENT)
Dept: FAMILY MEDICINE | Facility: CLINIC | Age: 84
End: 2019-03-10

## 2019-03-10 DIAGNOSIS — I10 ESSENTIAL HYPERTENSION, BENIGN: ICD-10-CM

## 2019-04-10 ENCOUNTER — COMMUNICATION - HEALTHEAST (OUTPATIENT)
Dept: FAMILY MEDICINE | Facility: CLINIC | Age: 84
End: 2019-04-10

## 2019-04-10 DIAGNOSIS — I10 HTN (HYPERTENSION): ICD-10-CM

## 2019-04-18 ENCOUNTER — COMMUNICATION - HEALTHEAST (OUTPATIENT)
Dept: FAMILY MEDICINE | Facility: CLINIC | Age: 84
End: 2019-04-18

## 2019-04-18 DIAGNOSIS — F33.0 MILD EPISODE OF RECURRENT MAJOR DEPRESSIVE DISORDER (H): ICD-10-CM

## 2019-04-29 ENCOUNTER — COMMUNICATION - HEALTHEAST (OUTPATIENT)
Dept: FAMILY MEDICINE | Facility: CLINIC | Age: 84
End: 2019-04-29

## 2019-04-29 DIAGNOSIS — I10 ESSENTIAL HYPERTENSION, BENIGN: ICD-10-CM

## 2019-05-01 ENCOUNTER — OFFICE VISIT - HEALTHEAST (OUTPATIENT)
Dept: FAMILY MEDICINE | Facility: CLINIC | Age: 84
End: 2019-05-01

## 2019-05-01 DIAGNOSIS — E11.40 TYPE 2 DIABETES MELLITUS WITH DIABETIC NEUROPATHY, WITHOUT LONG-TERM CURRENT USE OF INSULIN (H): ICD-10-CM

## 2019-05-01 DIAGNOSIS — E78.2 MIXED HYPERLIPIDEMIA: ICD-10-CM

## 2019-05-01 DIAGNOSIS — I10 ESSENTIAL HYPERTENSION, BENIGN: ICD-10-CM

## 2019-05-01 LAB
ALBUMIN SERPL-MCNC: 3.8 G/DL (ref 3.5–5)
ALP SERPL-CCNC: 69 U/L (ref 45–120)
ALT SERPL W P-5'-P-CCNC: 13 U/L (ref 0–45)
ANION GAP SERPL CALCULATED.3IONS-SCNC: 14 MMOL/L (ref 5–18)
AST SERPL W P-5'-P-CCNC: 17 U/L (ref 0–40)
BILIRUB DIRECT SERPL-MCNC: 0.2 MG/DL
BILIRUB SERPL-MCNC: 0.4 MG/DL (ref 0–1)
BUN SERPL-MCNC: 19 MG/DL (ref 8–28)
CALCIUM SERPL-MCNC: 10.2 MG/DL (ref 8.5–10.5)
CHLORIDE BLD-SCNC: 105 MMOL/L (ref 98–107)
CHOLEST SERPL-MCNC: 152 MG/DL
CO2 SERPL-SCNC: 24 MMOL/L (ref 22–31)
CREAT SERPL-MCNC: 1.11 MG/DL (ref 0.7–1.3)
FASTING STATUS PATIENT QL REPORTED: YES
GFR SERPL CREATININE-BSD FRML MDRD: >60 ML/MIN/1.73M2
GLUCOSE BLD-MCNC: 106 MG/DL (ref 70–125)
HBA1C MFR BLD: 7.1 % (ref 3.5–6)
HDLC SERPL-MCNC: 31 MG/DL
LDLC SERPL CALC-MCNC: 98 MG/DL
POTASSIUM BLD-SCNC: 4 MMOL/L (ref 3.5–5)
PROT SERPL-MCNC: 6.7 G/DL (ref 6–8)
SODIUM SERPL-SCNC: 143 MMOL/L (ref 136–145)
TRIGL SERPL-MCNC: 114 MG/DL

## 2019-05-01 ASSESSMENT — MIFFLIN-ST. JEOR: SCORE: 1320.98

## 2019-05-23 ENCOUNTER — COMMUNICATION - HEALTHEAST (OUTPATIENT)
Dept: FAMILY MEDICINE | Facility: CLINIC | Age: 84
End: 2019-05-23

## 2019-05-23 DIAGNOSIS — E11.40 TYPE 2 DIABETES MELLITUS WITH DIABETIC NEUROPATHY, WITHOUT LONG-TERM CURRENT USE OF INSULIN (H): ICD-10-CM

## 2019-05-24 ENCOUNTER — COMMUNICATION - HEALTHEAST (OUTPATIENT)
Dept: FAMILY MEDICINE | Facility: CLINIC | Age: 84
End: 2019-05-24

## 2019-05-24 DIAGNOSIS — I10 ESSENTIAL HYPERTENSION, BENIGN: ICD-10-CM

## 2019-05-29 ENCOUNTER — COMMUNICATION - HEALTHEAST (OUTPATIENT)
Dept: FAMILY MEDICINE | Facility: CLINIC | Age: 84
End: 2019-05-29

## 2019-06-16 ENCOUNTER — COMMUNICATION - HEALTHEAST (OUTPATIENT)
Dept: FAMILY MEDICINE | Facility: CLINIC | Age: 84
End: 2019-06-16

## 2019-06-16 DIAGNOSIS — I10 BENIGN ESSENTIAL HYPERTENSION: ICD-10-CM

## 2019-06-16 DIAGNOSIS — E78.5 HYPERLIPIDEMIA: ICD-10-CM

## 2019-08-09 ENCOUNTER — COMMUNICATION - HEALTHEAST (OUTPATIENT)
Dept: FAMILY MEDICINE | Facility: CLINIC | Age: 84
End: 2019-08-09

## 2019-08-09 DIAGNOSIS — G62.9 NEUROPATHY: ICD-10-CM

## 2019-08-21 ENCOUNTER — OFFICE VISIT - HEALTHEAST (OUTPATIENT)
Dept: FAMILY MEDICINE | Facility: CLINIC | Age: 84
End: 2019-08-21

## 2019-08-21 DIAGNOSIS — E78.2 MIXED HYPERLIPIDEMIA: ICD-10-CM

## 2019-08-21 DIAGNOSIS — I10 ESSENTIAL HYPERTENSION, BENIGN: ICD-10-CM

## 2019-08-21 DIAGNOSIS — E11.42 DIABETIC PERIPHERAL NEUROPATHY ASSOCIATED WITH TYPE 2 DIABETES MELLITUS (H): ICD-10-CM

## 2019-08-21 DIAGNOSIS — F32.5 MAJOR DEPRESSION IN COMPLETE REMISSION (H): ICD-10-CM

## 2019-08-21 DIAGNOSIS — E11.40 TYPE 2 DIABETES MELLITUS WITH DIABETIC NEUROPATHY, WITHOUT LONG-TERM CURRENT USE OF INSULIN (H): ICD-10-CM

## 2019-08-21 LAB
ALBUMIN SERPL-MCNC: 3.9 G/DL (ref 3.5–5)
ALP SERPL-CCNC: 83 U/L (ref 45–120)
ALT SERPL W P-5'-P-CCNC: 10 U/L (ref 0–45)
ANION GAP SERPL CALCULATED.3IONS-SCNC: 11 MMOL/L (ref 5–18)
AST SERPL W P-5'-P-CCNC: 17 U/L (ref 0–40)
BILIRUB DIRECT SERPL-MCNC: 0.2 MG/DL
BILIRUB SERPL-MCNC: 0.4 MG/DL (ref 0–1)
BUN SERPL-MCNC: 14 MG/DL (ref 8–28)
CALCIUM SERPL-MCNC: 10 MG/DL (ref 8.5–10.5)
CHLORIDE BLD-SCNC: 103 MMOL/L (ref 98–107)
CHOLEST SERPL-MCNC: 177 MG/DL
CO2 SERPL-SCNC: 28 MMOL/L (ref 22–31)
CREAT SERPL-MCNC: 1.36 MG/DL (ref 0.7–1.3)
FASTING STATUS PATIENT QL REPORTED: YES
GFR SERPL CREATININE-BSD FRML MDRD: 49 ML/MIN/1.73M2
GLUCOSE BLD-MCNC: 132 MG/DL (ref 70–125)
HBA1C MFR BLD: 6.7 % (ref 3.5–6)
HDLC SERPL-MCNC: 31 MG/DL
LDLC SERPL CALC-MCNC: 117 MG/DL
POTASSIUM BLD-SCNC: 4.1 MMOL/L (ref 3.5–5)
PROT SERPL-MCNC: 6.8 G/DL (ref 6–8)
SODIUM SERPL-SCNC: 142 MMOL/L (ref 136–145)
TRIGL SERPL-MCNC: 144 MG/DL

## 2019-08-21 ASSESSMENT — MIFFLIN-ST. JEOR: SCORE: 1303.97

## 2019-08-23 ENCOUNTER — COMMUNICATION - HEALTHEAST (OUTPATIENT)
Dept: FAMILY MEDICINE | Facility: CLINIC | Age: 84
End: 2019-08-23

## 2019-08-23 DIAGNOSIS — I10 ESSENTIAL HYPERTENSION, BENIGN: ICD-10-CM

## 2019-09-11 ENCOUNTER — COMMUNICATION - HEALTHEAST (OUTPATIENT)
Dept: FAMILY MEDICINE | Facility: CLINIC | Age: 84
End: 2019-09-11

## 2019-09-11 DIAGNOSIS — E11.40 TYPE 2 DIABETES MELLITUS WITH DIABETIC NEUROPATHY, WITHOUT LONG-TERM CURRENT USE OF INSULIN (H): ICD-10-CM

## 2019-09-13 ENCOUNTER — COMMUNICATION - HEALTHEAST (OUTPATIENT)
Dept: FAMILY MEDICINE | Facility: CLINIC | Age: 84
End: 2019-09-13

## 2019-09-13 DIAGNOSIS — E11.40 TYPE 2 DIABETES MELLITUS WITH DIABETIC NEUROPATHY, WITHOUT LONG-TERM CURRENT USE OF INSULIN (H): ICD-10-CM

## 2019-10-07 ENCOUNTER — COMMUNICATION - HEALTHEAST (OUTPATIENT)
Dept: FAMILY MEDICINE | Facility: CLINIC | Age: 84
End: 2019-10-07

## 2019-10-07 DIAGNOSIS — E11.40 TYPE 2 DIABETES MELLITUS WITH DIABETIC NEUROPATHY, WITHOUT LONG-TERM CURRENT USE OF INSULIN (H): ICD-10-CM

## 2019-11-11 ENCOUNTER — COMMUNICATION - HEALTHEAST (OUTPATIENT)
Dept: FAMILY MEDICINE | Facility: CLINIC | Age: 84
End: 2019-11-11

## 2019-11-11 DIAGNOSIS — E11.9 DIABETES (H): ICD-10-CM

## 2019-11-24 ENCOUNTER — COMMUNICATION - HEALTHEAST (OUTPATIENT)
Dept: FAMILY MEDICINE | Facility: CLINIC | Age: 84
End: 2019-11-24

## 2019-11-24 DIAGNOSIS — I10 ESSENTIAL HYPERTENSION, BENIGN: ICD-10-CM

## 2019-11-30 ENCOUNTER — COMMUNICATION - HEALTHEAST (OUTPATIENT)
Dept: FAMILY MEDICINE | Facility: CLINIC | Age: 84
End: 2019-11-30

## 2019-11-30 DIAGNOSIS — E11.40 TYPE 2 DIABETES MELLITUS WITH DIABETIC NEUROPATHY, WITHOUT LONG-TERM CURRENT USE OF INSULIN (H): ICD-10-CM

## 2019-12-15 ENCOUNTER — COMMUNICATION - HEALTHEAST (OUTPATIENT)
Dept: FAMILY MEDICINE | Facility: CLINIC | Age: 84
End: 2019-12-15

## 2019-12-15 DIAGNOSIS — I10 ESSENTIAL HYPERTENSION, BENIGN: ICD-10-CM

## 2019-12-18 ENCOUNTER — OFFICE VISIT - HEALTHEAST (OUTPATIENT)
Dept: FAMILY MEDICINE | Facility: CLINIC | Age: 84
End: 2019-12-18

## 2019-12-18 DIAGNOSIS — I10 ESSENTIAL HYPERTENSION, BENIGN: ICD-10-CM

## 2019-12-18 DIAGNOSIS — E11.40 TYPE 2 DIABETES MELLITUS WITH DIABETIC NEUROPATHY, WITHOUT LONG-TERM CURRENT USE OF INSULIN (H): ICD-10-CM

## 2019-12-18 DIAGNOSIS — E78.2 MIXED HYPERLIPIDEMIA: ICD-10-CM

## 2019-12-18 DIAGNOSIS — I35.0 AORTIC VALVE STENOSIS, ETIOLOGY OF CARDIAC VALVE DISEASE UNSPECIFIED: ICD-10-CM

## 2019-12-18 DIAGNOSIS — E11.42 DIABETIC PERIPHERAL NEUROPATHY ASSOCIATED WITH TYPE 2 DIABETES MELLITUS (H): ICD-10-CM

## 2019-12-18 LAB
ALBUMIN SERPL-MCNC: 3.7 G/DL (ref 3.5–5)
ALP SERPL-CCNC: 80 U/L (ref 45–120)
ALT SERPL W P-5'-P-CCNC: 11 U/L (ref 0–45)
ANION GAP SERPL CALCULATED.3IONS-SCNC: 10 MMOL/L (ref 5–18)
AST SERPL W P-5'-P-CCNC: 16 U/L (ref 0–40)
BILIRUB DIRECT SERPL-MCNC: 0.1 MG/DL
BILIRUB SERPL-MCNC: 0.4 MG/DL (ref 0–1)
BUN SERPL-MCNC: 19 MG/DL (ref 8–28)
CALCIUM SERPL-MCNC: 9.5 MG/DL (ref 8.5–10.5)
CHLORIDE BLD-SCNC: 105 MMOL/L (ref 98–107)
CHOLEST SERPL-MCNC: 153 MG/DL
CO2 SERPL-SCNC: 28 MMOL/L (ref 22–31)
CREAT SERPL-MCNC: 1.44 MG/DL (ref 0.7–1.3)
FASTING STATUS PATIENT QL REPORTED: YES
GFR SERPL CREATININE-BSD FRML MDRD: 46 ML/MIN/1.73M2
GLUCOSE BLD-MCNC: 153 MG/DL (ref 70–125)
HBA1C MFR BLD: 6.6 % (ref 3.5–6)
HDLC SERPL-MCNC: 30 MG/DL
LDLC SERPL CALC-MCNC: 99 MG/DL
POTASSIUM BLD-SCNC: 4.4 MMOL/L (ref 3.5–5)
PROT SERPL-MCNC: 6.5 G/DL (ref 6–8)
SODIUM SERPL-SCNC: 143 MMOL/L (ref 136–145)
TRIGL SERPL-MCNC: 121 MG/DL

## 2019-12-18 ASSESSMENT — MIFFLIN-ST. JEOR: SCORE: 1319.84

## 2019-12-23 ENCOUNTER — COMMUNICATION - HEALTHEAST (OUTPATIENT)
Dept: FAMILY MEDICINE | Facility: CLINIC | Age: 84
End: 2019-12-23

## 2019-12-23 DIAGNOSIS — I10 HTN (HYPERTENSION): ICD-10-CM

## 2019-12-28 ENCOUNTER — COMMUNICATION - HEALTHEAST (OUTPATIENT)
Dept: FAMILY MEDICINE | Facility: CLINIC | Age: 84
End: 2019-12-28

## 2019-12-28 DIAGNOSIS — E11.40 TYPE 2 DIABETES MELLITUS WITH DIABETIC NEUROPATHY, WITHOUT LONG-TERM CURRENT USE OF INSULIN (H): ICD-10-CM

## 2020-01-01 ENCOUNTER — COMMUNICATION - HEALTHEAST (OUTPATIENT)
Dept: CARDIOLOGY | Facility: CLINIC | Age: 85
End: 2020-01-01

## 2020-01-01 ENCOUNTER — OFFICE VISIT - HEALTHEAST (OUTPATIENT)
Dept: FAMILY MEDICINE | Facility: CLINIC | Age: 85
End: 2020-01-01

## 2020-01-01 ENCOUNTER — COMMUNICATION - HEALTHEAST (OUTPATIENT)
Dept: FAMILY MEDICINE | Facility: CLINIC | Age: 85
End: 2020-01-01

## 2020-01-01 ENCOUNTER — COMMUNICATION - HEALTHEAST (OUTPATIENT)
Dept: SCHEDULING | Facility: CLINIC | Age: 85
End: 2020-01-01

## 2020-01-01 ENCOUNTER — RECORDS - HEALTHEAST (OUTPATIENT)
Dept: LAB | Facility: CLINIC | Age: 85
End: 2020-01-01

## 2020-01-01 ENCOUNTER — SURGERY - HEALTHEAST (OUTPATIENT)
Dept: CARDIOLOGY | Facility: HOSPITAL | Age: 85
End: 2020-01-01

## 2020-01-01 DIAGNOSIS — I10 HTN (HYPERTENSION): ICD-10-CM

## 2020-01-01 DIAGNOSIS — I10 ESSENTIAL HYPERTENSION, BENIGN: ICD-10-CM

## 2020-01-01 DIAGNOSIS — L72.3 INFECTED SEBACEOUS CYST: ICD-10-CM

## 2020-01-01 DIAGNOSIS — L08.9 INFECTED SEBACEOUS CYST: ICD-10-CM

## 2020-01-01 DIAGNOSIS — E11.9 DIABETES (H): ICD-10-CM

## 2020-01-01 LAB
ANION GAP SERPL CALCULATED.3IONS-SCNC: 7 MMOL/L (ref 5–18)
BUN SERPL-MCNC: 32 MG/DL (ref 8–28)
CALCIUM SERPL-MCNC: 9 MG/DL (ref 8.5–10.5)
CHLORIDE BLD-SCNC: 100 MMOL/L (ref 98–107)
CO2 SERPL-SCNC: 27 MMOL/L (ref 22–31)
CREAT SERPL-MCNC: 1.52 MG/DL (ref 0.7–1.3)
GFR SERPL CREATININE-BSD FRML MDRD: 43 ML/MIN/1.73M2
GLUCOSE BLD-MCNC: 400 MG/DL (ref 70–125)
MAGNESIUM SERPL-MCNC: 1.8 MG/DL (ref 1.8–2.6)
POTASSIUM BLD-SCNC: 4.7 MMOL/L (ref 3.5–5)
SODIUM SERPL-SCNC: 134 MMOL/L (ref 136–145)

## 2020-01-01 ASSESSMENT — MIFFLIN-ST. JEOR
SCORE: 1281.29
SCORE: 1281.29

## 2020-01-06 ENCOUNTER — COMMUNICATION - HEALTHEAST (OUTPATIENT)
Dept: FAMILY MEDICINE | Facility: CLINIC | Age: 85
End: 2020-01-06

## 2020-01-06 ENCOUNTER — AMBULATORY - HEALTHEAST (OUTPATIENT)
Dept: FAMILY MEDICINE | Facility: CLINIC | Age: 85
End: 2020-01-06

## 2020-01-06 DIAGNOSIS — L98.9 SKIN LESION: ICD-10-CM

## 2020-01-06 DIAGNOSIS — E11.40 TYPE 2 DIABETES MELLITUS WITH DIABETIC NEUROPATHY, WITHOUT LONG-TERM CURRENT USE OF INSULIN (H): ICD-10-CM

## 2020-01-08 LAB
LAB AP CHARGES (HE HISTORICAL CONVERSION): NORMAL
PATH REPORT.COMMENTS IMP SPEC: NORMAL
PATH REPORT.FINAL DX SPEC: NORMAL
PATH REPORT.GROSS SPEC: NORMAL
PATH REPORT.MICROSCOPIC SPEC OTHER STN: NORMAL
PATH REPORT.RELEVANT HX SPEC: NORMAL
RESULT FLAG (HE HISTORICAL CONVERSION): NORMAL

## 2020-01-12 ENCOUNTER — COMMUNICATION - HEALTHEAST (OUTPATIENT)
Dept: FAMILY MEDICINE | Facility: CLINIC | Age: 85
End: 2020-01-12

## 2020-01-12 DIAGNOSIS — F33.0 MILD EPISODE OF RECURRENT MAJOR DEPRESSIVE DISORDER (H): ICD-10-CM

## 2020-01-13 ENCOUNTER — OFFICE VISIT - HEALTHEAST (OUTPATIENT)
Dept: FAMILY MEDICINE | Facility: CLINIC | Age: 85
End: 2020-01-13

## 2020-01-13 DIAGNOSIS — Z48.02 VISIT FOR SUTURE REMOVAL: ICD-10-CM

## 2020-01-19 ENCOUNTER — COMMUNICATION - HEALTHEAST (OUTPATIENT)
Dept: FAMILY MEDICINE | Facility: CLINIC | Age: 85
End: 2020-01-19

## 2020-01-19 DIAGNOSIS — I10 ESSENTIAL HYPERTENSION, BENIGN: ICD-10-CM

## 2020-04-07 ENCOUNTER — COMMUNICATION - HEALTHEAST (OUTPATIENT)
Dept: FAMILY MEDICINE | Facility: CLINIC | Age: 85
End: 2020-04-07

## 2020-04-07 DIAGNOSIS — F33.0 MILD EPISODE OF RECURRENT MAJOR DEPRESSIVE DISORDER (H): ICD-10-CM

## 2020-04-22 ENCOUNTER — OFFICE VISIT - HEALTHEAST (OUTPATIENT)
Dept: FAMILY MEDICINE | Facility: CLINIC | Age: 85
End: 2020-04-22

## 2020-04-22 DIAGNOSIS — E78.2 MIXED HYPERLIPIDEMIA: ICD-10-CM

## 2020-04-22 DIAGNOSIS — E11.40 TYPE 2 DIABETES MELLITUS WITH DIABETIC NEUROPATHY, WITHOUT LONG-TERM CURRENT USE OF INSULIN (H): ICD-10-CM

## 2020-04-22 DIAGNOSIS — I10 ESSENTIAL HYPERTENSION, BENIGN: ICD-10-CM

## 2020-04-22 ASSESSMENT — PATIENT HEALTH QUESTIONNAIRE - PHQ9: SUM OF ALL RESPONSES TO PHQ QUESTIONS 1-9: 0

## 2020-05-01 ENCOUNTER — COMMUNICATION - HEALTHEAST (OUTPATIENT)
Dept: FAMILY MEDICINE | Facility: CLINIC | Age: 85
End: 2020-05-01

## 2020-05-01 DIAGNOSIS — E11.40 TYPE 2 DIABETES MELLITUS WITH DIABETIC NEUROPATHY, WITHOUT LONG-TERM CURRENT USE OF INSULIN (H): ICD-10-CM

## 2020-05-18 ENCOUNTER — COMMUNICATION - HEALTHEAST (OUTPATIENT)
Dept: FAMILY MEDICINE | Facility: CLINIC | Age: 85
End: 2020-05-18

## 2020-05-18 DIAGNOSIS — I10 ESSENTIAL HYPERTENSION, BENIGN: ICD-10-CM

## 2020-06-02 ENCOUNTER — COMMUNICATION - HEALTHEAST (OUTPATIENT)
Dept: FAMILY MEDICINE | Facility: CLINIC | Age: 85
End: 2020-06-02

## 2020-06-02 DIAGNOSIS — I10 BENIGN ESSENTIAL HYPERTENSION: ICD-10-CM

## 2020-06-02 DIAGNOSIS — E78.5 HYPERLIPIDEMIA: ICD-10-CM

## 2020-07-02 ENCOUNTER — COMMUNICATION - HEALTHEAST (OUTPATIENT)
Dept: FAMILY MEDICINE | Facility: CLINIC | Age: 85
End: 2020-07-02

## 2020-07-02 ENCOUNTER — OFFICE VISIT - HEALTHEAST (OUTPATIENT)
Dept: FAMILY MEDICINE | Facility: CLINIC | Age: 85
End: 2020-07-02

## 2020-07-02 DIAGNOSIS — Z79.4 TYPE 2 DIABETES MELLITUS WITHOUT COMPLICATION, WITH LONG-TERM CURRENT USE OF INSULIN (H): ICD-10-CM

## 2020-07-02 DIAGNOSIS — E11.9 TYPE 2 DIABETES MELLITUS WITHOUT COMPLICATION, WITH LONG-TERM CURRENT USE OF INSULIN (H): ICD-10-CM

## 2020-07-02 DIAGNOSIS — E11.40 TYPE 2 DIABETES MELLITUS WITH DIABETIC NEUROPATHY, WITHOUT LONG-TERM CURRENT USE OF INSULIN (H): ICD-10-CM

## 2020-07-02 DIAGNOSIS — D17.30 LIPOMA OF SKIN AND SUBCUTANEOUS TISSUE: ICD-10-CM

## 2020-07-02 LAB
ERYTHROCYTE [DISTWIDTH] IN BLOOD BY AUTOMATED COUNT: 13.6 % (ref 11–14.5)
HBA1C MFR BLD: 7.3 % (ref 3.5–6)
HCT VFR BLD AUTO: 37.3 % (ref 40–54)
HGB BLD-MCNC: 12.4 G/DL (ref 14–18)
MCH RBC QN AUTO: 32.8 PG (ref 27–34)
MCHC RBC AUTO-ENTMCNC: 33.4 G/DL (ref 32–36)
MCV RBC AUTO: 98 FL (ref 80–100)
PLATELET # BLD AUTO: 299 THOU/UL (ref 140–440)
PMV BLD AUTO: 7.2 FL (ref 7–10)
RBC # BLD AUTO: 3.79 MILL/UL (ref 4.4–6.2)
WBC: 7.1 THOU/UL (ref 4–11)

## 2020-07-22 ENCOUNTER — COMMUNICATION - HEALTHEAST (OUTPATIENT)
Dept: FAMILY MEDICINE | Facility: CLINIC | Age: 85
End: 2020-07-22

## 2020-07-22 DIAGNOSIS — I10 ESSENTIAL HYPERTENSION, BENIGN: ICD-10-CM

## 2020-07-26 ENCOUNTER — COMMUNICATION - HEALTHEAST (OUTPATIENT)
Dept: FAMILY MEDICINE | Facility: CLINIC | Age: 85
End: 2020-07-26

## 2020-07-26 DIAGNOSIS — G62.9 NEUROPATHY: ICD-10-CM

## 2020-07-28 ENCOUNTER — AMBULATORY - HEALTHEAST (OUTPATIENT)
Dept: LAB | Facility: CLINIC | Age: 85
End: 2020-07-28

## 2020-07-28 DIAGNOSIS — I10 ESSENTIAL HYPERTENSION, BENIGN: ICD-10-CM

## 2020-07-28 LAB
ANION GAP SERPL CALCULATED.3IONS-SCNC: 9 MMOL/L (ref 5–18)
BUN SERPL-MCNC: 16 MG/DL (ref 8–28)
CALCIUM SERPL-MCNC: 9.5 MG/DL (ref 8.5–10.5)
CHLORIDE BLD-SCNC: 102 MMOL/L (ref 98–107)
CO2 SERPL-SCNC: 28 MMOL/L (ref 22–31)
CREAT SERPL-MCNC: 1.65 MG/DL (ref 0.7–1.3)
GFR SERPL CREATININE-BSD FRML MDRD: 39 ML/MIN/1.73M2
GLUCOSE BLD-MCNC: 204 MG/DL (ref 70–125)
POTASSIUM BLD-SCNC: 4.3 MMOL/L (ref 3.5–5)
SODIUM SERPL-SCNC: 139 MMOL/L (ref 136–145)

## 2020-08-11 ENCOUNTER — COMMUNICATION - HEALTHEAST (OUTPATIENT)
Dept: FAMILY MEDICINE | Facility: CLINIC | Age: 85
End: 2020-08-11

## 2020-08-11 DIAGNOSIS — I10 ESSENTIAL HYPERTENSION, BENIGN: ICD-10-CM

## 2021-01-01 ENCOUNTER — OFFICE VISIT - HEALTHEAST (OUTPATIENT)
Dept: GERIATRICS | Facility: CLINIC | Age: 86
End: 2021-01-01

## 2021-01-01 ENCOUNTER — RECORDS - HEALTHEAST (OUTPATIENT)
Dept: ADMINISTRATIVE | Facility: OTHER | Age: 86
End: 2021-01-01

## 2021-01-01 ENCOUNTER — COMMUNICATION - HEALTHEAST (OUTPATIENT)
Dept: SCHEDULING | Facility: CLINIC | Age: 86
End: 2021-01-01

## 2021-01-01 ENCOUNTER — HEALTH MAINTENANCE LETTER (OUTPATIENT)
Age: 86
End: 2021-01-01

## 2021-01-01 ENCOUNTER — RECORDS - HEALTHEAST (OUTPATIENT)
Dept: LAB | Facility: CLINIC | Age: 86
End: 2021-01-01

## 2021-01-01 ENCOUNTER — AMBULATORY - HEALTHEAST (OUTPATIENT)
Dept: GERIATRICS | Facility: CLINIC | Age: 86
End: 2021-01-01

## 2021-01-01 ENCOUNTER — RECORDS - HEALTHEAST (OUTPATIENT)
Dept: ADMINISTRATIVE | Facility: CLINIC | Age: 86
End: 2021-01-01

## 2021-01-01 ENCOUNTER — COMMUNICATION - HEALTHEAST (OUTPATIENT)
Dept: FAMILY MEDICINE | Facility: CLINIC | Age: 86
End: 2021-01-01

## 2021-01-01 ENCOUNTER — OFFICE VISIT - HEALTHEAST (OUTPATIENT)
Dept: CARDIOLOGY | Facility: CLINIC | Age: 86
End: 2021-01-01

## 2021-01-01 ENCOUNTER — AMBULATORY - HEALTHEAST (OUTPATIENT)
Dept: CARDIOLOGY | Facility: CLINIC | Age: 86
End: 2021-01-01

## 2021-01-01 ENCOUNTER — SURGERY - HEALTHEAST (OUTPATIENT)
Dept: SURGERY | Facility: CLINIC | Age: 86
End: 2021-01-01

## 2021-01-01 ENCOUNTER — ANESTHESIA - HEALTHEAST (OUTPATIENT)
Dept: SURGERY | Facility: CLINIC | Age: 86
End: 2021-01-01

## 2021-01-01 ENCOUNTER — COMMUNICATION - HEALTHEAST (OUTPATIENT)
Dept: CARDIOLOGY | Facility: CLINIC | Age: 86
End: 2021-01-01

## 2021-01-01 ENCOUNTER — COMMUNICATION - HEALTHEAST (OUTPATIENT)
Dept: GERIATRICS | Facility: CLINIC | Age: 86
End: 2021-01-01

## 2021-01-01 VITALS
BODY MASS INDEX: 17.88 KG/M2 | BODY MASS INDEX: 17.81 KG/M2 | HEIGHT: 73 IN | WEIGHT: 146.9 LBS | BODY MASS INDEX: 19.38 KG/M2 | WEIGHT: 135 LBS

## 2021-01-01 VITALS — HEIGHT: 73 IN | BODY MASS INDEX: 19.88 KG/M2 | WEIGHT: 150 LBS

## 2021-01-01 VITALS
DIASTOLIC BLOOD PRESSURE: 70 MMHG | SYSTOLIC BLOOD PRESSURE: 136 MMHG | WEIGHT: 148.5 LBS | BODY MASS INDEX: 21.26 KG/M2 | RESPIRATION RATE: 18 BRPM | OXYGEN SATURATION: 96 % | HEART RATE: 66 BPM | HEIGHT: 70 IN

## 2021-01-01 VITALS — HEIGHT: 70 IN | WEIGHT: 145 LBS | BODY MASS INDEX: 20.76 KG/M2

## 2021-01-01 VITALS — BODY MASS INDEX: 21.19 KG/M2 | WEIGHT: 148 LBS | HEIGHT: 70 IN

## 2021-01-01 VITALS — WEIGHT: 147 LBS | BODY MASS INDEX: 20.79 KG/M2

## 2021-01-01 VITALS — HEIGHT: 71 IN | WEIGHT: 144 LBS | BODY MASS INDEX: 20.16 KG/M2

## 2021-01-01 VITALS — WEIGHT: 144 LBS | BODY MASS INDEX: 20.37 KG/M2

## 2021-01-01 VITALS — BODY MASS INDEX: 18.54 KG/M2 | WEIGHT: 140.5 LBS

## 2021-01-01 VITALS — BODY MASS INDEX: 20.79 KG/M2 | WEIGHT: 147 LBS

## 2021-01-01 VITALS — BODY MASS INDEX: 19.55 KG/M2 | WEIGHT: 148.2 LBS

## 2021-01-01 VITALS — WEIGHT: 146 LBS | BODY MASS INDEX: 20.65 KG/M2

## 2021-01-01 VITALS — BODY MASS INDEX: 20.58 KG/M2 | HEIGHT: 71 IN | WEIGHT: 147 LBS

## 2021-01-01 VITALS — WEIGHT: 143 LBS | HEIGHT: 71 IN | BODY MASS INDEX: 20.02 KG/M2

## 2021-01-01 VITALS — BODY MASS INDEX: 21.77 KG/M2 | WEIGHT: 147 LBS | HEIGHT: 69 IN

## 2021-01-01 VITALS — WEIGHT: 143 LBS | BODY MASS INDEX: 20.23 KG/M2

## 2021-01-01 VITALS — WEIGHT: 142 LBS | BODY MASS INDEX: 20.09 KG/M2

## 2021-01-01 VITALS — WEIGHT: 145 LBS

## 2021-01-01 DIAGNOSIS — N18.30 STAGE 3 CHRONIC KIDNEY DISEASE, UNSPECIFIED WHETHER STAGE 3A OR 3B CKD (H): ICD-10-CM

## 2021-01-01 DIAGNOSIS — S72.142A CLOSED INTERTROCHANTERIC FRACTURE OF LEFT HIP, INITIAL ENCOUNTER (H): ICD-10-CM

## 2021-01-01 DIAGNOSIS — I10 ESSENTIAL HYPERTENSION: ICD-10-CM

## 2021-01-01 DIAGNOSIS — R41.89 COGNITIVE AND BEHAVIORAL CHANGES: ICD-10-CM

## 2021-01-01 DIAGNOSIS — I10 HYPERTENSION, UNSPECIFIED TYPE: ICD-10-CM

## 2021-01-01 DIAGNOSIS — I21.4 NON-STEMI (NON-ST ELEVATED MYOCARDIAL INFARCTION) (H): ICD-10-CM

## 2021-01-01 DIAGNOSIS — R46.89 COGNITIVE AND BEHAVIORAL CHANGES: ICD-10-CM

## 2021-01-01 DIAGNOSIS — F33.0 MILD EPISODE OF RECURRENT MAJOR DEPRESSIVE DISORDER (H): ICD-10-CM

## 2021-01-01 DIAGNOSIS — I05.0 MITRAL VALVE STENOSIS, UNSPECIFIED ETIOLOGY: ICD-10-CM

## 2021-01-01 DIAGNOSIS — I21.4 NSTEMI (NON-ST ELEVATED MYOCARDIAL INFARCTION) (H): ICD-10-CM

## 2021-01-01 DIAGNOSIS — I11.0 LVH (LEFT VENTRICULAR HYPERTROPHY) DUE TO HYPERTENSIVE DISEASE, WITH HEART FAILURE (H): ICD-10-CM

## 2021-01-01 DIAGNOSIS — I35.0 NONRHEUMATIC AORTIC VALVE STENOSIS: ICD-10-CM

## 2021-01-01 DIAGNOSIS — I25.10 CORONARY ARTERY DISEASE INVOLVING NATIVE CORONARY ARTERY OF NATIVE HEART WITHOUT ANGINA PECTORIS: ICD-10-CM

## 2021-01-01 DIAGNOSIS — E78.2 MIXED HYPERLIPIDEMIA: ICD-10-CM

## 2021-01-01 DIAGNOSIS — I51.7 LVH (LEFT VENTRICULAR HYPERTROPHY): ICD-10-CM

## 2021-01-01 DIAGNOSIS — E11.40 TYPE 2 DIABETES MELLITUS WITH DIABETIC NEUROPATHY, WITHOUT LONG-TERM CURRENT USE OF INSULIN (H): ICD-10-CM

## 2021-01-01 DIAGNOSIS — F05 POSTOPERATIVE DELIRIUM: ICD-10-CM

## 2021-01-01 DIAGNOSIS — I35.0 AORTIC STENOSIS: ICD-10-CM

## 2021-01-01 DIAGNOSIS — I11.0: ICD-10-CM

## 2021-01-01 DIAGNOSIS — I50.30 HEART FAILURE WITH PRESERVED EJECTION FRACTION, NYHA CLASS II (H): ICD-10-CM

## 2021-01-01 LAB
25(OH)D3 SERPL-MCNC: 21.8 NG/ML (ref 30–80)
ANION GAP SERPL CALCULATED.3IONS-SCNC: 10 MMOL/L (ref 5–18)
ANION GAP SERPL CALCULATED.3IONS-SCNC: 11 MMOL/L (ref 5–18)
ANION GAP SERPL CALCULATED.3IONS-SCNC: 14 MMOL/L (ref 5–18)
ANION GAP SERPL CALCULATED.3IONS-SCNC: 9 MMOL/L (ref 5–18)
ANION GAP SERPL CALCULATED.3IONS-SCNC: 9 MMOL/L (ref 5–18)
BUN SERPL-MCNC: 19 MG/DL (ref 8–28)
BUN SERPL-MCNC: 23 MG/DL (ref 8–28)
BUN SERPL-MCNC: 24 MG/DL (ref 8–28)
BUN SERPL-MCNC: 26 MG/DL (ref 8–28)
BUN SERPL-MCNC: 33 MG/DL (ref 8–28)
CALCIUM SERPL-MCNC: 9.3 MG/DL (ref 8.5–10.5)
CALCIUM SERPL-MCNC: 9.4 MG/DL (ref 8.5–10.5)
CALCIUM SERPL-MCNC: 9.9 MG/DL (ref 8.5–10.5)
CHLORIDE BLD-SCNC: 101 MMOL/L (ref 98–107)
CHLORIDE BLD-SCNC: 104 MMOL/L (ref 98–107)
CHLORIDE BLD-SCNC: 107 MMOL/L (ref 98–107)
CHLORIDE BLD-SCNC: 108 MMOL/L (ref 98–107)
CHLORIDE BLD-SCNC: 108 MMOL/L (ref 98–107)
CO2 SERPL-SCNC: 21 MMOL/L (ref 22–31)
CO2 SERPL-SCNC: 23 MMOL/L (ref 22–31)
CO2 SERPL-SCNC: 24 MMOL/L (ref 22–31)
CO2 SERPL-SCNC: 24 MMOL/L (ref 22–31)
CO2 SERPL-SCNC: 26 MMOL/L (ref 22–31)
CREAT SERPL-MCNC: 1.08 MG/DL (ref 0.7–1.3)
CREAT SERPL-MCNC: 1.39 MG/DL (ref 0.7–1.3)
CREAT SERPL-MCNC: 1.4 MG/DL (ref 0.7–1.3)
CREAT SERPL-MCNC: 1.43 MG/DL (ref 0.7–1.3)
CREAT SERPL-MCNC: 1.57 MG/DL (ref 0.7–1.3)
ERYTHROCYTE [DISTWIDTH] IN BLOOD BY AUTOMATED COUNT: 13.2 % (ref 11–14.5)
ERYTHROCYTE [DISTWIDTH] IN BLOOD BY AUTOMATED COUNT: 13.7 % (ref 11–14.5)
ERYTHROCYTE [DISTWIDTH] IN BLOOD BY AUTOMATED COUNT: 17.8 % (ref 11–14.5)
ERYTHROCYTE [DISTWIDTH] IN BLOOD BY AUTOMATED COUNT: 18.4 % (ref 11–14.5)
ERYTHROCYTE [DISTWIDTH] IN BLOOD BY AUTOMATED COUNT: 19.3 % (ref 11–14.5)
GFR SERPL CREATININE-BSD FRML MDRD: 41 ML/MIN/1.73M2
GFR SERPL CREATININE-BSD FRML MDRD: 46 ML/MIN/1.73M2
GFR SERPL CREATININE-BSD FRML MDRD: 47 ML/MIN/1.73M2
GFR SERPL CREATININE-BSD FRML MDRD: 48 ML/MIN/1.73M2
GFR SERPL CREATININE-BSD FRML MDRD: >60 ML/MIN/1.73M2
GLUCOSE BLD-MCNC: 100 MG/DL (ref 70–125)
GLUCOSE BLD-MCNC: 141 MG/DL (ref 70–125)
GLUCOSE BLD-MCNC: 173 MG/DL (ref 70–125)
GLUCOSE BLD-MCNC: 179 MG/DL (ref 70–125)
GLUCOSE BLD-MCNC: 242 MG/DL (ref 70–125)
HBA1C MFR BLD: 10 %
HBA1C MFR BLD: 6.9 %
HCT VFR BLD AUTO: 31.2 % (ref 40–54)
HCT VFR BLD AUTO: 31.8 % (ref 40–54)
HCT VFR BLD AUTO: 33.7 % (ref 40–54)
HCT VFR BLD AUTO: 33.9 % (ref 40–54)
HCT VFR BLD AUTO: 35.3 % (ref 40–54)
HGB BLD-MCNC: 10.1 G/DL (ref 14–18)
HGB BLD-MCNC: 10.4 G/DL (ref 14–18)
HGB BLD-MCNC: 10.7 G/DL (ref 14–18)
HGB BLD-MCNC: 11.1 G/DL (ref 14–18)
HGB BLD-MCNC: 9.9 G/DL (ref 14–18)
MAGNESIUM SERPL-MCNC: 1.5 MG/DL (ref 1.8–2.6)
MAGNESIUM SERPL-MCNC: 1.8 MG/DL (ref 1.8–2.6)
MCH RBC QN AUTO: 30.7 PG (ref 27–34)
MCH RBC QN AUTO: 31 PG (ref 27–34)
MCH RBC QN AUTO: 31.2 PG (ref 27–34)
MCH RBC QN AUTO: 31.4 PG (ref 27–34)
MCH RBC QN AUTO: 31.9 PG (ref 27–34)
MCHC RBC AUTO-ENTMCNC: 30.7 G/DL (ref 32–36)
MCHC RBC AUTO-ENTMCNC: 31.4 G/DL (ref 32–36)
MCHC RBC AUTO-ENTMCNC: 31.7 G/DL (ref 32–36)
MCHC RBC AUTO-ENTMCNC: 31.8 G/DL (ref 32–36)
MCHC RBC AUTO-ENTMCNC: 31.8 G/DL (ref 32–36)
MCV RBC AUTO: 100 FL (ref 80–100)
MCV RBC AUTO: 101 FL (ref 80–100)
MCV RBC AUTO: 101 FL (ref 80–100)
MCV RBC AUTO: 97 FL (ref 80–100)
MCV RBC AUTO: 98 FL (ref 80–100)
PLATELET # BLD AUTO: 259 THOU/UL (ref 140–440)
PLATELET # BLD AUTO: 275 THOU/UL (ref 140–440)
PLATELET # BLD AUTO: 284 THOU/UL (ref 140–440)
PLATELET # BLD AUTO: 424 THOU/UL (ref 140–440)
PLATELET # BLD AUTO: 527 THOU/UL (ref 140–440)
PMV BLD AUTO: 10.2 FL (ref 8.5–12.5)
PMV BLD AUTO: 9.3 FL (ref 8.5–12.5)
PMV BLD AUTO: 9.6 FL (ref 8.5–12.5)
PMV BLD AUTO: 9.6 FL (ref 8.5–12.5)
PMV BLD AUTO: 9.8 FL (ref 8.5–12.5)
POTASSIUM BLD-SCNC: 3.2 MMOL/L (ref 3.5–5)
POTASSIUM BLD-SCNC: 3.6 MMOL/L (ref 3.5–5)
POTASSIUM BLD-SCNC: 4 MMOL/L (ref 3.5–5)
POTASSIUM BLD-SCNC: 4.2 MMOL/L (ref 3.5–5)
POTASSIUM BLD-SCNC: 4.4 MMOL/L (ref 3.5–5)
RBC # BLD AUTO: 3.17 MILL/UL (ref 4.4–6.2)
RBC # BLD AUTO: 3.29 MILL/UL (ref 4.4–6.2)
RBC # BLD AUTO: 3.35 MILL/UL (ref 4.4–6.2)
RBC # BLD AUTO: 3.36 MILL/UL (ref 4.4–6.2)
RBC # BLD AUTO: 3.53 MILL/UL (ref 4.4–6.2)
SODIUM SERPL-SCNC: 136 MMOL/L (ref 136–145)
SODIUM SERPL-SCNC: 136 MMOL/L (ref 136–145)
SODIUM SERPL-SCNC: 140 MMOL/L (ref 136–145)
SODIUM SERPL-SCNC: 142 MMOL/L (ref 136–145)
SODIUM SERPL-SCNC: 145 MMOL/L (ref 136–145)
TSH SERPL DL<=0.005 MIU/L-ACNC: 1.56 UIU/ML (ref 0.3–5)
VIT B12 SERPL-MCNC: 465 PG/ML (ref 213–816)
WBC: 10.9 THOU/UL (ref 4–11)
WBC: 5.5 THOU/UL (ref 4–11)
WBC: 6.1 THOU/UL (ref 4–11)
WBC: 6.7 THOU/UL (ref 4–11)
WBC: 8.9 THOU/UL (ref 4–11)

## 2021-01-01 ASSESSMENT — MIFFLIN-ST. JEOR
SCORE: 1360.21
SCORE: 1324.38
SCORE: 1351.6
SCORE: 1331.18

## 2021-05-27 NOTE — TELEPHONE ENCOUNTER
Refill Approved    Rx renewed per Medication Renewal Policy. Medication was last renewed on 4/7/18.    Brianna Bird, [facility] Connection Triage/Med Refill 4/10/2019     Requested Prescriptions   Pending Prescriptions Disp Refills     furosemide (LASIX) 20 MG tablet [Pharmacy Med Name: FUROSEMIDE 20 MG TABLET] 90 tablet 3     Sig: TAKE 1 TABLET (20 MG TOTAL) BY MOUTH DAILY.       Diuretics/Combination Diuretics Refill Protocol  Passed - 4/10/2019  1:57 AM        Passed - Visit with PCP or prescribing provider visit in past 12 months     Last office visit with prescriber/PCP: 1/17/2019 Parrish Silva MD OR same dept: 1/17/2019 Parrish Silva MD OR same specialty: 1/17/2019 Parrish Silva MD  Last physical: Visit date not found Last MTM visit: Visit date not found   Next visit within 3 mo: Visit date not found  Next physical within 3 mo: Visit date not found  Prescriber OR PCP: Parrish Silva MD  Last diagnosis associated with med order: 1. HTN (hypertension)  - furosemide (LASIX) 20 MG tablet [Pharmacy Med Name: FUROSEMIDE 20 MG TABLET]; Take 1 tablet (20 mg total) by mouth daily.  Dispense: 90 tablet; Refill: 3    If protocol passes may refill for 12 months if within 3 months of last provider visit (or a total of 15 months).             Passed - Serum Potassium in past 12 months      Lab Results   Component Value Date    Potassium 4.1 01/17/2019             Passed - Serum Sodium in past 12 months      Lab Results   Component Value Date    Sodium 142 01/17/2019             Passed - Blood pressure on file in past 12 months     BP Readings from Last 1 Encounters:   01/17/19 122/76             Passed - Serum Creatinine in past 12 months      Creatinine   Date Value Ref Range Status   01/17/2019 1.04 0.70 - 1.30 mg/dL Final

## 2021-05-28 NOTE — TELEPHONE ENCOUNTER
Refill Approved    Rx renewed per Medication Renewal Policy. Medication was last renewed on 10/17/18.    Brianna Bird, [facility] Connection Triage/Med Refill 4/29/2019     Requested Prescriptions   Pending Prescriptions Disp Refills     hydroCHLOROthiazide (MICROZIDE) 12.5 mg capsule [Pharmacy Med Name: HYDROCHLOROTHIAZIDE 12.5 MG CP] 90 capsule 1     Sig: TAKE 1 CAPSULE BY MOUTH EVERY DAY       Diuretics/Combination Diuretics Refill Protocol  Passed - 4/29/2019  2:14 AM        Passed - Visit with PCP or prescribing provider visit in past 12 months     Last office visit with prescriber/PCP: 1/17/2019 Parrish Silva MD OR same dept: 1/17/2019 Parrish Silva MD OR same specialty: 1/17/2019 Parrish Silva MD  Last physical: Visit date not found Last MTM visit: Visit date not found   Next visit within 3 mo: Visit date not found  Next physical within 3 mo: Visit date not found  Prescriber OR PCP: Parrish Silva MD  Last diagnosis associated with med order: 1. Benign Essential Hypertension  - hydroCHLOROthiazide (MICROZIDE) 12.5 mg capsule [Pharmacy Med Name: HYDROCHLOROTHIAZIDE 12.5 MG CP]; TAKE 1 CAPSULE BY MOUTH EVERY DAY  Dispense: 90 capsule; Refill: 1    If protocol passes may refill for 12 months if within 3 months of last provider visit (or a total of 15 months).             Passed - Serum Potassium in past 12 months      Lab Results   Component Value Date    Potassium 4.1 01/17/2019             Passed - Serum Sodium in past 12 months      Lab Results   Component Value Date    Sodium 142 01/17/2019             Passed - Blood pressure on file in past 12 months     BP Readings from Last 1 Encounters:   01/17/19 122/76             Passed - Serum Creatinine in past 12 months      Creatinine   Date Value Ref Range Status   01/17/2019 1.04 0.70 - 1.30 mg/dL Final

## 2021-05-28 NOTE — PROGRESS NOTES
Assessment:  1.  Non-insulin-dependent diabetes mellitus, checking status.  2.  Hypertension with adequate control.  3.  Hyperlipidemia.  4.  Death earlier this year of his wife.    Plan: Check fasting A1c, lipid hepatic and basic profiles.  Recommend he discontinue his combined metformin glipizide but because it does not give us the flexibility of adjusting the dose of the glipizide.  Recommend he start metformin 500 mg-2 p.o. twice daily with meals-#120 refillable x2 provided.  I prescribed glipizide 5 mg-1 p.o. twice daily with meals-#60 refillable x2 prescribed.  Recommend he be checking some blood sugars before his meals and 2 hours after his meals and not only in the morning in order to have a little more information.  Follow-up in 3 months for recheck on the diabetes but earlier as needed.  If he should have any increased trouble with sleeping, appetite, depressed mood, etc. I asked him to let us know.  He appears to be appropriately grieving the death of his wife.  He has good support with his daughters in the area.    Subjective: 89-year-old male presenting for follow-up on the above.  Regarding diabetes he notes his blood sugars sometimes seem to low in the morning.  He states that they usually about 100, but occasionally in the 70 range.  He eats a snack at bedtime because otherwise he feels he will be too low in the morning.  Regarding hypertension no headaches or dizziness leg swelling.  Regarding lipids no diarrhea constipation most likely muscle weakness.  His wife had  from complications of her Alzheimer's dementia earlier this year.  His daughter Maureen is his power of  and he states she does not excellent job.  He also has a daughter who works as a nurse in a nursing home in [facility] and her name is Sujatha.  Patient Active Problem List   Diagnosis     Basal Cell Carcinoma Of The Skin Of The Trunk     Hyperlipidemia     Diabetes mellitus (H)     Benign Essential Hypertension     Major  depressive disorder, recurrent episode, unspecified     Retinal hemorrhage, left     Cataract     Retinal vein occlusion, central, left     Glaucoma     Diabetic peripheral neuropathy associated with type 2 diabetes mellitus (H)     Cervical kyphosis     Aortic stenosis     Past Medical History:   Diagnosis Date     Depression      Diabetes (H)      Diabetes (H)      Hyperlipidemia      Hypertension      No Known Allergies  Current Outpatient Medications   Medication Sig Dispense Refill     aspirin 81 MG EC tablet Take 81 mg by mouth bedtime.       blood glucose test (GLUCOSE BLOOD) strips Use 1 each As Directed daily before breakfast. Dispense brand per patient's insurance at pharmacy discretion. 100 strip 3     escitalopram oxalate (LEXAPRO) 5 MG tablet TAKE 1 TABLET BY MOUTH EVERY DAY 90 tablet 2     furosemide (LASIX) 20 MG tablet TAKE 1 TABLET (20 MG TOTAL) BY MOUTH DAILY. 90 tablet 2     gabapentin (NEURONTIN) 600 MG tablet Take 1 tablet (600 mg total) by mouth at bedtime. 90 tablet 1     hydroCHLOROthiazide (MICROZIDE) 12.5 mg capsule TAKE 1 CAPSULE BY MOUTH EVERY DAY 90 capsule 2     KLOR-CON M20 20 mEq tablet TAKE 1 TABLET BY MOUTH EVERY DAY 90 tablet 2     latanoprost (XALATAN) 0.005 % ophthalmic solution Administer 1 drop to both eyes bedtime.       lisinopril (PRINIVIL,ZESTRIL) 20 MG tablet TAKE ONE TABLET BY MOUTH ONE TIME DAILY 90 tablet 3     lovastatin (MEVACOR) 20 MG tablet Take 1 tablet (20 mg total) by mouth every evening. 90 tablet 3     metoprolol succinate (TOPROL-XL) 25 MG TAKE 1 TABLET (25 MG TOTAL) BY MOUTH DAILY. 90 tablet 3     sodium chloride (OCEAN) 0.65 % nasal spray 1 spray into each nostril as needed for congestion.       verapamil (VERELAN PM) 240 MG 24 hr capsule TAKE ONE CAPSULE BY MOUTH ONE TIME DAILY 90 capsule 2     glipiZIDE (GLUCOTROL) 5 MG tablet Take 1 tablet (5 mg total) by mouth 2 (two) times a day before meals. 60 tablet 2     metFORMIN (GLUCOPHAGE) 500 MG tablet Take 2  "tablets (1,000 mg total) by mouth 2 (two) times a day with meals. 120 tablet 2     No current facility-administered medications for this visit.      Note that the above is after the change in the dose of the glipizide and the metformin today.  Do not drink alcohol.  No smoking.  All other review of systems are negative.  He does note that he has appointment to see ophthalmologist within the next month.  He did drive to the appointment here today and feels comfortable with that.  PHQ-9 score today is 5.    Objective:/82   Pulse 62   Ht 5' 10.5\" (1.791 m)   Wt 147 lb (66.7 kg)   BMI 20.79 kg/m    HEENT examination shows no acute change.  Neck supple.  Lungs clear.  Heart regular rate and rhythm with grade 2/6 crescendo decrescendo murmur consistent with his known aortic stenosis.  Abdomen shows no masses tenderness or hepatospleno megaly.  No pedal edema.  He is alert with clear speech.  Normal affect.  "

## 2021-05-28 NOTE — TELEPHONE ENCOUNTER
Refill Approved    Rx renewed per Medication Renewal Policy. Medication was last renewed on 10/17/18.    Brianna Bird, [facility] Connection Triage/Med Refill 4/19/2019     Requested Prescriptions   Pending Prescriptions Disp Refills     escitalopram oxalate (LEXAPRO) 5 MG tablet [Pharmacy Med Name: ESCITALOPRAM 5 MG TABLET] 90 tablet 1     Sig: TAKE 1 TABLET BY MOUTH EVERY DAY       SS[facility] Refill Protocol  Passed - 4/18/2019  2:03 AM        Passed - PCP or prescribing provider visit in last year     Last office visit with prescriber/PCP: 1/17/2019 Parrish Silva MD OR same dept: 1/17/2019 Parrish Silva MD OR same specialty: 1/17/2019 Parrish Silva MD  Last physical: Visit date not found Last MTM visit: Visit date not found   Next visit within 3 mo: Visit date not found  Next physical within 3 mo: Visit date not found  Prescriber OR PCP: Parrish Silva MD  Last diagnosis associated with med order: There are no diagnoses linked to this encounter.  If protocol passes may refill for 12 months if within 3 months of last provider visit (or a total of 15 months).

## 2021-05-29 NOTE — TELEPHONE ENCOUNTER
Prescription sent to pharmacy electronically. Please call and let patient know this should be available for  at the pharmacy.    Patient's daughter notified.

## 2021-05-29 NOTE — TELEPHONE ENCOUNTER
RN cannot approve Refill Request    RN can NOT refill this medication Protocol failed and NO refill given.       Brianna Bird, [facility] Triage/Med Refill 5/24/2019    Requested Prescriptions   Pending Prescriptions Disp Refills     glipiZIDE (GLUCOTROL) 5 MG tablet [Pharmacy Med Name: GLIPIZIDE 5 MG TABLET] 60 tablet 0     Sig: TAKE 1 TABLET (5 MG TOTAL) BY MOUTH 2 (TWO) TIMES A DAY BEFORE MEALS.       Oral Hypoglycemics Refill Protocol Failed - 5/23/2019  9:37 AM        Failed - Microalbumin in last year      Microalbumin, Random Urine   Date Value Ref Range Status   03/03/2010 12 1 - 20 mg/L Final                  Passed - Visit with PCP or prescribing provider visit in last 6 months       Last office visit with prescriber/PCP: 5/1/2019 OR same dept: 5/1/2019 Parrish Silva MD OR same specialty: 5/1/2019 Parrish Silva MD Last physical: Visit date not found Last MTM visit: Visit date not found         Next appt within 3 mo: Visit date not found  Next physical within 3 mo: Visit date not found  Prescriber OR PCP: Parrish Silva MD  Last diagnosis associated with med order: 1. Type 2 diabetes mellitus with diabetic neuropathy, without long-term current use of insulin (H)  - glipiZIDE (GLUCOTROL) 5 MG tablet [Pharmacy Med Name: GLIPIZIDE 5 MG TABLET]; Take 1 tablet (5 mg total) by mouth 2 (two) times a day before meals.  Dispense: 60 tablet; Refill: 0  - metFORMIN (GLUCOPHAGE) 500 MG tablet [Pharmacy Med Name: METFORMIN  MG TABLET]; TAKE 2 TABLETS BY MOUTH TWICE A DAY WITH FOOD  Dispense: 120 tablet; Refill: 0     If protocol passes may refill for 12 months if within 3 months of last provider visit (or a total of 15 months).           Passed - A1C in last 6 months     Hemoglobin A1c   Date Value Ref Range Status   05/01/2019 7.1 (H) 3.5 - 6.0 % Final               Passed - Blood pressure in last year     BP Readings from Last 1 Encounters:   05/01/19 144/82             Passed - Serum  creatinine in last year     Creatinine   Date Value Ref Range Status   05/01/2019 1.11 0.70 - 1.30 mg/dL Final             metFORMIN (GLUCOPHAGE) 500 MG tablet [Pharmacy Med Name: METFORMIN  MG TABLET] 120 tablet 0     Sig: TAKE 2 TABLETS BY MOUTH TWICE A DAY WITH FOOD       Metformin Refill Protocol Failed - 5/23/2019  9:37 AM        Failed - Microalbumin in last year      Microalbumin, Random Urine   Date Value Ref Range Status   03/03/2010 12 1 - 20 mg/L Final                  Passed - Blood pressure in last 12 months     BP Readings from Last 1 Encounters:   05/01/19 144/82             Passed - LFT or AST or ALT in last 12 months     Albumin   Date Value Ref Range Status   05/01/2019 3.8 3.5 - 5.0 g/dL Final     Bilirubin, Total   Date Value Ref Range Status   05/01/2019 0.4 0.0 - 1.0 mg/dL Final     Bilirubin, Direct   Date Value Ref Range Status   05/01/2019 0.2 <=0.5 mg/dL Final     Alkaline Phosphatase   Date Value Ref Range Status   05/01/2019 69 45 - 120 U/L Final     AST   Date Value Ref Range Status   05/01/2019 17 0 - 40 U/L Final     ALT   Date Value Ref Range Status   05/01/2019 13 0 - 45 U/L Final     Protein, Total   Date Value Ref Range Status   05/01/2019 6.7 6.0 - 8.0 g/dL Final                Passed - GFR or Serum Creatinine in last 6 months     GFR MDRD Non Af Amer   Date Value Ref Range Status   05/01/2019 >60 >60 mL/min/1.73m2 Final     GFR MDRD Af Amer   Date Value Ref Range Status   05/01/2019 >60 >60 mL/min/1.73m2 Final             Passed - Visit with PCP or prescribing provider visit in last 6 months or next 3 months     Last office visit with prescriber/PCP: 5/1/2019 OR same dept: 5/1/2019 Parrish Silva MD OR same specialty: 5/1/2019 Parrish Silva MD Last physical: Visit date not found Last MTM visit: Visit date not found         Next appt within 3 mo: Visit date not found  Next physical within 3 mo: Visit date not found  Prescriber OR PCP: Parrish Silva MD  Last  diagnosis associated with med order: 1. Type 2 diabetes mellitus with diabetic neuropathy, without long-term current use of insulin (H)  - glipiZIDE (GLUCOTROL) 5 MG tablet [Pharmacy Med Name: GLIPIZIDE 5 MG TABLET]; Take 1 tablet (5 mg total) by mouth 2 (two) times a day before meals.  Dispense: 60 tablet; Refill: 0  - metFORMIN (GLUCOPHAGE) 500 MG tablet [Pharmacy Med Name: METFORMIN  MG TABLET]; TAKE 2 TABLETS BY MOUTH TWICE A DAY WITH FOOD  Dispense: 120 tablet; Refill: 0     If protocol passes may refill for 12 months if within 3 months of last provider visit (or a total of 15 months).           Passed - A1C in last 6 months     Hemoglobin A1c   Date Value Ref Range Status   05/01/2019 7.1 (H) 3.5 - 6.0 % Final

## 2021-05-29 NOTE — TELEPHONE ENCOUNTER
Pt note left on dr. Silva desk - daughter wondering if pt shold come back sooner than aug since meds were adjusted. Per Dr. Silva ok to RTC in 3 months if no issues. Pt daughter reports 2 spots on pt knees. Asked him if he fell & he said no. Is keeping then clean & dry. Pt daughter will set up appt if spots are getting worse/ bothersome.

## 2021-05-29 NOTE — TELEPHONE ENCOUNTER
Refill Approved    Rx renewed per Medication Renewal Policy. Medication was last renewed on 6/27/18.6/24/18    Brianna Bird, [facility] Triage/Med Refill 6/17/2019     Requested Prescriptions   Pending Prescriptions Disp Refills     lisinopril (PRINIVIL,ZESTRIL) 20 MG tablet [Pharmacy Med Name: LISINOPRIL 20 MG TABLET] 90 tablet 3     Sig: TAKE 1 TABLET BY MOUTH EVERY DAY       Ace Inhibitors Refill Protocol Passed - 6/16/2019 12:47 AM        Passed - PCP or prescribing provider visit in past 12 months       Last office visit with prescriber/PCP: 5/1/2019 Parrish Silva MD OR same dept: 5/1/2019 Parrish Silva MD OR same specialty: 5/1/2019 Parrish Silva MD  Last physical: Visit date not found Last MTM visit: Visit date not found   Next visit within 3 mo: Visit date not found  Next physical within 3 mo: Visit date not found  Prescriber OR PCP: Parrish Silva MD  Last diagnosis associated with med order: 1. Benign essential hypertension  - lisinopril (PRINIVIL,ZESTRIL) 20 MG tablet [Pharmacy Med Name: LISINOPRIL 20 MG TABLET]; TAKE 1 TABLET BY MOUTH EVERY DAY  Dispense: 90 tablet; Refill: 3    2. Hyperlipidemia  - lovastatin (MEVACOR) 20 MG tablet [Pharmacy Med Name: LOVASTATIN 20 MG TABLET]; Take 1 tablet (20 mg total) by mouth every evening.  Dispense: 90 tablet; Refill: 3    If protocol passes may refill for 12 months if within 3 months of last provider visit (or a total of 15 months).             Passed - Serum Potassium in past 12 months     Lab Results   Component Value Date    Potassium 4.0 05/01/2019             Passed - Blood pressure filed in past 12 months     BP Readings from Last 1 Encounters:   05/01/19 144/82             Passed - Serum Creatinine in past 12 months     Creatinine   Date Value Ref Range Status   05/01/2019 1.11 0.70 - 1.30 mg/dL Final             lovastatin (MEVACOR) 20 MG tablet [Pharmacy Med Name: LOVASTATIN 20 MG TABLET] 90 tablet 3     Sig: TAKE 1 TABLET (20 MG  TOTAL) BY MOUTH EVERY EVENING.       Statins Refill Protocol (Hmg CoA Reductase Inhibitors) Passed - 6/16/2019 12:47 AM        Passed - PCP or prescribing provider visit in past 12 months      Last office visit with prescriber/PCP: 5/1/2019 Parrish Silva MD OR same dept: 5/1/2019 Parrish Silva MD OR same specialty: 5/1/2019 Parrish Silva MD  Last physical: Visit date not found Last MTM visit: Visit date not found   Next visit within 3 mo: Visit date not found  Next physical within 3 mo: Visit date not found  Prescriber OR PCP: Parrish Silva MD  Last diagnosis associated with med order: 1. Benign essential hypertension  - lisinopril (PRINIVIL,ZESTRIL) 20 MG tablet [Pharmacy Med Name: LISINOPRIL 20 MG TABLET]; TAKE 1 TABLET BY MOUTH EVERY DAY  Dispense: 90 tablet; Refill: 3    2. Hyperlipidemia  - lovastatin (MEVACOR) 20 MG tablet [Pharmacy Med Name: LOVASTATIN 20 MG TABLET]; Take 1 tablet (20 mg total) by mouth every evening.  Dispense: 90 tablet; Refill: 3    If protocol passes may refill for 12 months if within 3 months of last provider visit (or a total of 15 months).

## 2021-05-31 NOTE — TELEPHONE ENCOUNTER
Refill Approved    Rx renewed per Medication Renewal Policy. Medication was last renewed on 5/24/19.    Fatimah Castanon, [facility] Connection Triage/Med Refill 8/24/2019     Requested Prescriptions   Pending Prescriptions Disp Refills     verapamil (VERELAN PM) 240 MG 24 hr capsule [Pharmacy Med Name: VERAPAMIL  MG CAPSULE] 90 capsule 0     Sig: TAKE 1 CAPSULE BY MOUTH EVERY DAY       Calcium-Channel Blockers Protocol Passed - 8/23/2019 10:59 AM        Passed - PCP or prescribing provider visit in past 12 months or next 3 months     Last office visit with prescriber/PCP: Visit date not found OR same dept: 8/21/2019 Parrish Silva MD OR same specialty: 8/21/2019 Parrish Silva MD  Last physical: Visit date not found Last MTM visit: Visit date not found   Next visit within 3 mo: Visit date not found  Next physical within 3 mo: Visit date not found  Prescriber OR PCP: Megan Stephenson MD  Last diagnosis associated with med order: 1. Essential hypertension, benign  - verapamil (VERELAN PM) 240 MG 24 hr capsule [Pharmacy Med Name: VERAPAMIL  MG CAPSULE]; TAKE 1 CAPSULE BY MOUTH EVERY DAY  Dispense: 90 capsule; Refill: 0    If protocol passes may refill for 12 months if within 3 months of last provider visit (or a total of 15 months).             Passed - Blood pressure filed in past 12 months     BP Readings from Last 1 Encounters:   08/21/19 150/90

## 2021-05-31 NOTE — PROGRESS NOTES
Assessment:  1.  Non-insulin-dependent diabetes mellitus with history of peripheral neuropathy, appearing to be controlled, checking status.  2.  Hypertension controlled adequately for his circumstance.  3.  Hyperlipidemia checking status.  4.  Major depression in remission.    Plan: Check fasting A1c, lipid hepatic and basic profiles.  Continue current medication regimen.  Follow-up at minimum in 4 months for next med check but earlier as needed.  Refills on meds as needed in the meantime.  He sees his ophthalmologist later this year for recheck on the glaucoma.    Subjective: 89-year-old male presenting for follow-up on the above.  Regarding diabetes he does check blood sugars at least once a day and sometimes twice a day and he states the morning reading is often 82 110.  He in the evening it is often 130.  He has had no dizziness or sweating difficulty.  Regarding hypertension no headaches or leg swelling.  Regarding lipids no diarrhea constipation muscle aching or muscle weakness.  He notes he uses the walker in the house.  He has the 3 daughters in this general area and 3 great grandsons he states.  He is adjusting to the death of his wife earlier this year and states he still living in the house.  Patient Active Problem List   Diagnosis     Basal Cell Carcinoma Of The Skin Of The Trunk     Hyperlipidemia     Diabetes mellitus (H)     Benign Essential Hypertension     Retinal hemorrhage, left     Cataract     Central retinal vein occlusion of left eye     Glaucoma     Diabetic peripheral neuropathy associated with type 2 diabetes mellitus (H)     Cervical kyphosis     Aortic stenosis     Major depression in complete remission (H)     Past Medical History:   Diagnosis Date     Depression      Diabetes (H)      Diabetes (H)      Hyperlipidemia      Hypertension      No Known Allergies  Current Outpatient Medications   Medication Sig Dispense Refill     aspirin 81 MG EC tablet Take 81 mg by mouth bedtime.        "blood glucose test (GLUCOSE BLOOD) strips Use 1 each As Directed daily before breakfast. Dispense brand per patient's insurance at pharmacy discretion. 100 strip 3     escitalopram oxalate (LEXAPRO) 5 MG tablet TAKE 1 TABLET BY MOUTH EVERY DAY 90 tablet 2     furosemide (LASIX) 20 MG tablet TAKE 1 TABLET (20 MG TOTAL) BY MOUTH DAILY. 90 tablet 2     gabapentin (NEURONTIN) 600 MG tablet Take 1 tablet (600 mg total) by mouth at bedtime. 90 tablet 3     glipiZIDE (GLUCOTROL) 5 MG tablet TAKE 1 TABLET (5 MG TOTAL) BY MOUTH 2 (TWO) TIMES A DAY BEFORE MEALS. 120 tablet 0     hydroCHLOROthiazide (MICROZIDE) 12.5 mg capsule TAKE 1 CAPSULE BY MOUTH EVERY DAY 90 capsule 2     KLOR-CON M20 20 mEq tablet TAKE 1 TABLET BY MOUTH EVERY DAY 90 tablet 2     latanoprost (XALATAN) 0.005 % ophthalmic solution Administer 1 drop to both eyes bedtime.       lisinopril (PRINIVIL,ZESTRIL) 20 MG tablet TAKE 1 TABLET BY MOUTH EVERY DAY 90 tablet 3     lovastatin (MEVACOR) 20 MG tablet TAKE 1 TABLET (20 MG TOTAL) BY MOUTH EVERY EVENING. 90 tablet 3     metFORMIN (GLUCOPHAGE) 500 MG tablet TAKE 2 TABLETS BY MOUTH TWICE A DAY WITH FOOD 240 tablet 0     metoprolol succinate (TOPROL-XL) 25 MG TAKE 1 TABLET (25 MG TOTAL) BY MOUTH DAILY. 90 tablet 3     sodium chloride (OCEAN) 0.65 % nasal spray 1 spray into each nostril as needed for congestion.       verapamil (VERELAN PM) 240 MG 24 hr capsule TAKE ONE CAPSULE BY MOUTH ONE TIME DAILY 90 capsule 0     No current facility-administered medications for this visit.      All other review of systems are negative.    Objective:/90   Pulse 66   Ht 5' 10\" (1.778 m)   Wt 145 lb (65.8 kg)   SpO2 96%   BMI 20.81 kg/m    HEENT shows no acute change.  Neck supple without adenopathy or thyromegaly.  Lungs clear to auscultation.  Heart regular rate and rhythm.  Abdomen shows no masses tenderness or hepatosplenomegaly.  No pitting edema at the ankle.  He does have some kyphosis.  He is alert with clear " speech.  He does ambulate independently.  PHQ-9 score today is 4.

## 2021-05-31 NOTE — TELEPHONE ENCOUNTER
Refill Approved    Rx renewed per Medication Renewal Policy. Medication was last renewed on 10/17/18.    Gabriella Mcleod, [facility] Triage/Med Refill 8/10/2019     Requested Prescriptions   Pending Prescriptions Disp Refills     gabapentin (NEURONTIN) 600 MG tablet [Pharmacy Med Name: GABAPENTIN 600 MG TABLET] 90 tablet 1     Sig: TAKE 1 TABLET (600 MG TOTAL) BY MOUTH AT BEDTIME.       Gabapentin/Levetiracetam/Tiagabine Refill Protocol  Passed - 8/9/2019 12:21 PM        Passed - PCP or prescribing provider visit in past 12 months or next 3 months     Last office visit with prescriber/PCP: 5/1/2019 Parrish Silva MD OR same dept: 5/1/2019 Parrish Silva MD OR same specialty: 5/1/2019 Parrish Silva MD  Last physical: Visit date not found Last MTM visit: Visit date not found   Next visit within 3 mo: Visit date not found  Next physical within 3 mo: Visit date not found  Prescriber OR PCP: Parrish Silva MD  Last diagnosis associated with med order: 1. Neuropathy  - gabapentin (NEURONTIN) 600 MG tablet [Pharmacy Med Name: GABAPENTIN 600 MG TABLET]; Take 1 tablet (600 mg total) by mouth at bedtime.  Dispense: 90 tablet; Refill: 1    If protocol passes may refill for 12 months if within 3 months of last provider visit (or a total of 15 months).

## 2021-06-01 NOTE — TELEPHONE ENCOUNTER
RN cannot approve Refill Request    RN can NOT refill this medication PCP messaged that patient is overdue for Labs. Last office visit: 8/21/2019 Parrish Silva MD Last Physical: Visit date not found Last MTM visit: Visit date not found Last visit same specialty: 8/21/2019 Parrish Silva MD.  Next visit within 3 mo: Visit date not found  Next physical within 3 mo: Visit date not found      Mark Nicholson, [facility] Triage/Med Refill 9/13/2019    Requested Prescriptions   Pending Prescriptions Disp Refills     metFORMIN (GLUCOPHAGE) 500 MG tablet [Pharmacy Med Name: METFORMIN  MG TABLET] 240 tablet 1     Sig: TAKE 2 TABLETS BY MOUTH TWICE A DAY WITH FOOD       Metformin Refill Protocol Failed - 9/13/2019  1:34 PM        Failed - Microalbumin in last year      Microalbumin, Random Urine   Date Value Ref Range Status   03/03/2010 12 1 - 20 mg/L Final                  Passed - Blood pressure in last 12 months     BP Readings from Last 1 Encounters:   08/21/19 150/90             Passed - LFT or AST or ALT in last 12 months     Albumin   Date Value Ref Range Status   08/21/2019 3.9 3.5 - 5.0 g/dL Final     Bilirubin, Total   Date Value Ref Range Status   08/21/2019 0.4 0.0 - 1.0 mg/dL Final     Bilirubin, Direct   Date Value Ref Range Status   08/21/2019 0.2 <=0.5 mg/dL Final     Alkaline Phosphatase   Date Value Ref Range Status   08/21/2019 83 45 - 120 U/L Final     AST   Date Value Ref Range Status   08/21/2019 17 0 - 40 U/L Final     ALT   Date Value Ref Range Status   08/21/2019 10 0 - 45 U/L Final     Protein, Total   Date Value Ref Range Status   08/21/2019 6.8 6.0 - 8.0 g/dL Final                Passed - GFR or Serum Creatinine in last 6 months     GFR MDRD Non Af Amer   Date Value Ref Range Status   08/21/2019 49 (L) >60 mL/min/1.73m2 Final     GFR MDRD Af Amer   Date Value Ref Range Status   08/21/2019 59 (L) >60 mL/min/1.73m2 Final             Passed - Visit with PCP or prescribing  provider visit in last 6 months or next 3 months     Last office visit with prescriber/PCP: 8/21/2019 OR same dept: 8/21/2019 Parrish Silva MD OR same specialty: 8/21/2019 Parrish Silva MD Last physical: Visit date not found Last MTM visit: Visit date not found         Next appt within 3 mo: Visit date not found  Next physical within 3 mo: Visit date not found  Prescriber OR PCP: Parrish Silva MD  Last diagnosis associated with med order: 1. Type 2 diabetes mellitus with diabetic neuropathy, without long-term current use of insulin (H)  - metFORMIN (GLUCOPHAGE) 500 MG tablet [Pharmacy Med Name: METFORMIN  MG TABLET]; TAKE 2 TABLETS BY MOUTH TWICE A DAY WITH FOOD  Dispense: 240 tablet; Refill: 1     If protocol passes may refill for 12 months if within 3 months of last provider visit (or a total of 15 months).           Passed - A1C in last 6 months     Hemoglobin A1c   Date Value Ref Range Status   08/21/2019 6.7 (H) 3.5 - 6.0 % Final

## 2021-06-01 NOTE — TELEPHONE ENCOUNTER
RN cannot approve Refill Request    RN can NOT refill this medication PCP messaged that patient is overdue for Labs. Last office visit: 8/21/2019 Parrish Silva MD Last Physical: Visit date not found Last MTM visit: Visit date not found Last visit same specialty: 8/21/2019 Parrish Silva MD.  Next visit within 3 mo: Visit date not found  Next physical within 3 mo: Visit date not found      Dawna Patel, [facility] Triage/Med Refill 9/11/2019    Requested Prescriptions   Pending Prescriptions Disp Refills     glipiZIDE (GLUCOTROL) 5 MG tablet [Pharmacy Med Name: GLIPIZIDE 5 MG TABLET] 120 tablet 1     Sig: TAKE 1 TABLET (5 MG TOTAL) BY MOUTH 2 (TWO) TIMES A DAY BEFORE MEALS.       Oral Hypoglycemics Refill Protocol Failed - 9/11/2019  7:34 AM        Failed - Microalbumin in last year      Microalbumin, Random Urine   Date Value Ref Range Status   03/03/2010 12 1 - 20 mg/L Final                  Passed - Visit with PCP or prescribing provider visit in last 6 months       Last office visit with prescriber/PCP: 8/21/2019 OR same dept: 8/21/2019 Parrish Silva MD OR same specialty: 8/21/2019 Parrish Silva MD Last physical: Visit date not found Last MTM visit: Visit date not found         Next appt within 3 mo: Visit date not found  Next physical within 3 mo: Visit date not found  Prescriber OR PCP: Parrish Silva MD  Last diagnosis associated with med order: 1. Type 2 diabetes mellitus with diabetic neuropathy, without long-term current use of insulin (H)  - glipiZIDE (GLUCOTROL) 5 MG tablet [Pharmacy Med Name: GLIPIZIDE 5 MG TABLET]; Take 1 tablet (5 mg total) by mouth 2 (two) times a day before meals.  Dispense: 120 tablet; Refill: 1     If protocol passes may refill for 12 months if within 3 months of last provider visit (or a total of 15 months).           Passed - A1C in last 6 months     Hemoglobin A1c   Date Value Ref Range Status   08/21/2019 6.7 (H) 3.5 - 6.0 % Final                Passed - Blood pressure in last year     BP Readings from Last 1 Encounters:   08/21/19 150/90             Passed - Serum creatinine in last year     Creatinine   Date Value Ref Range Status   08/21/2019 1.36 (H) 0.70 - 1.30 mg/dL Final

## 2021-06-02 NOTE — TELEPHONE ENCOUNTER
RN cannot approve Refill Request    RN can NOT refill this medication PCP messaged that patient is overdue for Labs. Last office visit: Visit date not found Last Physical: Visit date not found Last MTM visit: Visit date not found Last visit same specialty: 8/21/2019 Parrish Silva MD.  Next visit within 3 mo: Visit date not found  Next physical within 3 mo: Visit date not found      Kei H Immanuel, [facility] Triage/Med Refill 10/8/2019    Requested Prescriptions   Pending Prescriptions Disp Refills     metFORMIN (GLUCOPHAGE) 500 MG tablet [Pharmacy Med Name: METFORMIN  MG TABLET] 240 tablet 1     Sig: TAKE 2 TABLETS BY MOUTH TWICE A DAY WITH FOOD       Metformin Refill Protocol Failed - 10/7/2019  8:33 AM        Failed - Microalbumin in last year      Microalbumin, Random Urine   Date Value Ref Range Status   03/03/2010 12 1 - 20 mg/L Final                  Passed - Blood pressure in last 12 months     BP Readings from Last 1 Encounters:   08/21/19 150/90             Passed - LFT or AST or ALT in last 12 months     Albumin   Date Value Ref Range Status   08/21/2019 3.9 3.5 - 5.0 g/dL Final     Bilirubin, Total   Date Value Ref Range Status   08/21/2019 0.4 0.0 - 1.0 mg/dL Final     Bilirubin, Direct   Date Value Ref Range Status   08/21/2019 0.2 <=0.5 mg/dL Final     Alkaline Phosphatase   Date Value Ref Range Status   08/21/2019 83 45 - 120 U/L Final     AST   Date Value Ref Range Status   08/21/2019 17 0 - 40 U/L Final     ALT   Date Value Ref Range Status   08/21/2019 10 0 - 45 U/L Final     Protein, Total   Date Value Ref Range Status   08/21/2019 6.8 6.0 - 8.0 g/dL Final                Passed - GFR or Serum Creatinine in last 6 months     GFR MDRD Non Af Amer   Date Value Ref Range Status   08/21/2019 49 (L) >60 mL/min/1.73m2 Final     GFR MDRD Af Amer   Date Value Ref Range Status   08/21/2019 59 (L) >60 mL/min/1.73m2 Final             Passed - Visit with PCP or prescribing provider visit in  last 6 months or next 3 months     Last office visit with prescriber/PCP: Visit date not found OR same dept: 8/21/2019 Parrish Silva MD OR same specialty: 8/21/2019 Parrish Silva MD Last physical: Visit date not found Last MTM visit: Visit date not found         Next appt within 3 mo: Visit date not found  Next physical within 3 mo: Visit date not found  Prescriber OR PCP: Porsche Tirado MD ([place]y)  Last diagnosis associated with med order: 1. Type 2 diabetes mellitus with diabetic neuropathy, without long-term current use of insulin (H)  - metFORMIN (GLUCOPHAGE) 500 MG tablet [Pharmacy Med Name: METFORMIN  MG TABLET]; TAKE 2 TABLETS BY MOUTH TWICE A DAY WITH FOOD  Dispense: 240 tablet; Refill: 1     If protocol passes may refill for 12 months if within 3 months of last provider visit (or a total of 15 months).           Passed - A1C in last 6 months     Hemoglobin A1c   Date Value Ref Range Status   08/21/2019 6.7 (H) 3.5 - 6.0 % Final

## 2021-06-03 NOTE — TELEPHONE ENCOUNTER
Refill Approved    Rx renewed per Medication Renewal Policy. Medication was last renewed on 3/4/2019 for 90/2.  Last OV 8/21/2019 med management   Quita Belcher, [facility] Triage/Med Refill 11/24/2019     Requested Prescriptions   Pending Prescriptions Disp Refills     KLOR-CON M20 20 mEq tablet [Pharmacy Med Name: KLOR-CON M20 TABLET] 90 tablet 2     Sig: TAKE 1 TABLET BY MOUTH EVERY DAY       Potassium Supplements Refill Protocol Passed - 11/24/2019  4:38 AM        Passed - PCP or prescribing provider visit in past 12 months       Last office visit with prescriber/PCP: 8/21/2019 Parrish Silva MD OR same dept: 8/21/2019 Parrish Silva MD OR same specialty: 8/21/2019 Parrish Silva MD  Last physical: Visit date not found Last MTM visit: Visit date not found   Next visit within 3 mo: Visit date not found  Next physical within 3 mo: Visit date not found  Prescriber OR PCP: Parrish Silva MD  Last diagnosis associated with med order: 1. Benign Essential Hypertension  - KLOR-CON M20 20 mEq tablet [Pharmacy Med Name: KLOR-CON M20 TABLET]; TAKE 1 TABLET BY MOUTH EVERY DAY  Dispense: 90 tablet; Refill: 2    If protocol passes may refill for 12 months if within 3 months of last provider visit (or a total of 15 months).             Passed - Potassium level in last 12 months     Lab Results   Component Value Date    Potassium 4.1 08/21/2019

## 2021-06-03 NOTE — TELEPHONE ENCOUNTER
RN cannot approve Refill Request    RN can NOT refill this medication Protocol failed and NO refill given. Last office visit: Visit date not found Last Physical: Visit date not found Last MTM visit: Visit date not found Last visit same specialty: 8/21/2019 Parrish Silva MD.  Next visit within 3 mo: Visit date not found  Next physical within 3 mo: Visit date not found      Jenny Conn, [facility] Triage/Med Refill 11/30/2019    Requested Prescriptions   Pending Prescriptions Disp Refills     metFORMIN (GLUCOPHAGE) 500 MG tablet [Pharmacy Med Name: METFORMIN  MG TABLET] 120 tablet 3     Sig: TAKE 2 TABLETS BY MOUTH TWICE A DAY WITH FOOD       Metformin Refill Protocol Failed - 11/30/2019  8:33 AM        Failed - Microalbumin in last year      Microalbumin, Random Urine   Date Value Ref Range Status   03/03/2010 12 1 - 20 mg/L Final                  Passed - Blood pressure in last 12 months     BP Readings from Last 1 Encounters:   08/21/19 150/90             Passed - LFT or AST or ALT in last 12 months     Albumin   Date Value Ref Range Status   08/21/2019 3.9 3.5 - 5.0 g/dL Final     Bilirubin, Total   Date Value Ref Range Status   08/21/2019 0.4 0.0 - 1.0 mg/dL Final     Bilirubin, Direct   Date Value Ref Range Status   08/21/2019 0.2 <=0.5 mg/dL Final     Alkaline Phosphatase   Date Value Ref Range Status   08/21/2019 83 45 - 120 U/L Final     AST   Date Value Ref Range Status   08/21/2019 17 0 - 40 U/L Final     ALT   Date Value Ref Range Status   08/21/2019 10 0 - 45 U/L Final     Protein, Total   Date Value Ref Range Status   08/21/2019 6.8 6.0 - 8.0 g/dL Final                Passed - GFR or Serum Creatinine in last 6 months     GFR MDRD Non Af Amer   Date Value Ref Range Status   08/21/2019 49 (L) >60 mL/min/1.73m2 Final     GFR MDRD Af Amer   Date Value Ref Range Status   08/21/2019 59 (L) >60 mL/min/1.73m2 Final             Passed - Visit with PCP or prescribing provider visit in last 6  months or next 3 months     Last office visit with prescriber/PCP: Visit date not found OR same dept: 8/21/2019 Parrish Silva MD OR same specialty: 8/21/2019 Parrish Silva MD Last physical: Visit date not found Last MTM visit: Visit date not found         Next appt within 3 mo: Visit date not found  Next physical within 3 mo: Visit date not found  Prescriber OR PCP: Tariq Stafford CNP  Last diagnosis associated with med order: 1. Type 2 diabetes mellitus with diabetic neuropathy, without long-term current use of insulin (H)  - metFORMIN (GLUCOPHAGE) 500 MG tablet [Pharmacy Med Name: METFORMIN  MG TABLET]; TAKE 2 TABLETS BY MOUTH TWICE A DAY WITH FOOD  Dispense: 120 tablet; Refill: 3     If protocol passes may refill for 12 months if within 3 months of last provider visit (or a total of 15 months).           Passed - A1C in last 6 months     Hemoglobin A1c   Date Value Ref Range Status   08/21/2019 6.7 (H) 3.5 - 6.0 % Final

## 2021-06-03 NOTE — TELEPHONE ENCOUNTER
Refill Approved    Rx renewed per Medication Renewal Policy. Medication was last renewed on 10/17/18.    Gabriella Mcleod, [facility] Triage/Med Refill 11/11/2019     Requested Prescriptions   Pending Prescriptions Disp Refills     ONETOUCH VERIO strips [Pharmacy Med Name: ONE TOUCH VERIO TEST STRIP] 100 strip 3     Sig: USE TO TEST BLOOD SUGAR ONCE DAILY BEFORE BREAKFAST.       Diabetic Supplies Refill Protocol Passed - 11/11/2019 12:37 PM        Passed - Visit with PCP or prescribing provider visit in last 6 months     Last office visit with prescriber/PCP: 8/21/2019 Parrish Silva MD OR same dept: 8/21/2019 Parrish Silva MD OR same specialty: 8/21/2019 Parrish Silva MD  Last physical: Visit date not found Last MTM visit: Visit date not found   Next visit within 3 mo: Visit date not found  Next physical within 3 mo: Visit date not found  Prescriber OR PCP: Parrish Silva MD  Last diagnosis associated with med order: 1. Diabetes (H)  - ONETOUCH VERIO strips [Pharmacy Med Name: ONE TOUCH VERIO TEST STRIP]; USE TO TEST BLOOD SUGAR ONCE DAILY BEFORE BREAKFAST.  Dispense: 100 strip; Refill: 3    If protocol passes may refill for 12 months if within 3 months of last provider visit (or a total of 15 months).             Passed - A1C in last 6 months     Hemoglobin A1c   Date Value Ref Range Status   08/21/2019 6.7 (H) 3.5 - 6.0 % Final

## 2021-06-04 NOTE — TELEPHONE ENCOUNTER
RN cannot approve Refill Request    RN can NOT refill this medication Protocol failed and NO refill given.         Brianna Bird, [facility] Triage/Med Refill 12/29/2019    Requested Prescriptions   Pending Prescriptions Disp Refills     metFORMIN (GLUCOPHAGE) 500 MG tablet [Pharmacy Med Name: METFORMIN  MG TABLET] 360 tablet 3     Sig: TAKE 2 TABLETS BY MOUTH TWICE A DAY WITH FOOD       Metformin Refill Protocol Failed - 12/28/2019  7:35 AM        Failed - Microalbumin in last year      Microalbumin, Random Urine   Date Value Ref Range Status   03/03/2010 12 1 - 20 mg/L Final                  Passed - Blood pressure in last 12 months     BP Readings from Last 1 Encounters:   12/18/19 136/70             Passed - LFT or AST or ALT in last 12 months     Albumin   Date Value Ref Range Status   12/18/2019 3.7 3.5 - 5.0 g/dL Final     Bilirubin, Total   Date Value Ref Range Status   12/18/2019 0.4 0.0 - 1.0 mg/dL Final     Bilirubin, Direct   Date Value Ref Range Status   12/18/2019 0.1 <=0.5 mg/dL Final     Alkaline Phosphatase   Date Value Ref Range Status   12/18/2019 80 45 - 120 U/L Final     AST   Date Value Ref Range Status   12/18/2019 16 0 - 40 U/L Final     ALT   Date Value Ref Range Status   12/18/2019 11 0 - 45 U/L Final     Protein, Total   Date Value Ref Range Status   12/18/2019 6.5 6.0 - 8.0 g/dL Final                Passed - GFR or Serum Creatinine in last 6 months     GFR MDRD Non Af Amer   Date Value Ref Range Status   12/18/2019 46 (L) >60 mL/min/1.73m2 Final     GFR MDRD Af Amer   Date Value Ref Range Status   12/18/2019 56 (L) >60 mL/min/1.73m2 Final             Passed - Visit with PCP or prescribing provider visit in last 6 months or next 3 months     Last office visit with prescriber/PCP: 12/18/2019 OR same dept: 12/18/2019 Parrish Silva MD OR same specialty: 12/18/2019 Parrish Silva MD Last physical: Visit date not found Last MTM visit: Visit date not found         Next appt  within 3 mo: Visit date not found  Next physical within 3 mo: Visit date not found  Prescriber OR PCP: Parrish Silva MD  Last diagnosis associated with med order: 1. Type 2 diabetes mellitus with diabetic neuropathy, without long-term current use of insulin (H)  - metFORMIN (GLUCOPHAGE) 500 MG tablet [Pharmacy Med Name: METFORMIN  MG TABLET]; TAKE 2 TABLETS BY MOUTH TWICE A DAY WITH FOOD  Dispense: 120 tablet; Refill: 0     If protocol passes may refill for 12 months if within 3 months of last provider visit (or a total of 15 months).           Passed - A1C in last 6 months     Hemoglobin A1c   Date Value Ref Range Status   12/18/2019 6.6 (H) 3.5 - 6.0 % Final

## 2021-06-04 NOTE — TELEPHONE ENCOUNTER
Refill Approved    Rx renewed per Medication Renewal Policy. Medication was last renewed on 4/10/2019.    Fernandez Montero, [facility] Triage/Med Refill 12/25/2019     Requested Prescriptions   Pending Prescriptions Disp Refills     furosemide (LASIX) 20 MG tablet [Pharmacy Med Name: FUROSEMIDE 20 MG TABLET] 90 tablet 2     Sig: TAKE 1 TABLET (20 MG TOTAL) BY MOUTH DAILY.       Diuretics/Combination Diuretics Refill Protocol  Passed - 12/23/2019  3:36 AM        Passed - Visit with PCP or prescribing provider visit in past 12 months     Last office visit with prescriber/PCP: 12/18/2019 Parrish Silva MD OR same dept: 12/18/2019 Parrish Silva MD OR same specialty: 12/18/2019 Parrish Silva MD  Last physical: Visit date not found Last MTM visit: Visit date not found   Next visit within 3 mo: Visit date not found  Next physical within 3 mo: Visit date not found  Prescriber OR PCP: Parrish Silva MD  Last diagnosis associated with med order: 1. HTN (hypertension)  - furosemide (LASIX) 20 MG tablet [Pharmacy Med Name: FUROSEMIDE 20 MG TABLET]; TAKE 1 TABLET (20 MG TOTAL) BY MOUTH DAILY.  Dispense: 90 tablet; Refill: 2    If protocol passes may refill for 12 months if within 3 months of last provider visit (or a total of 15 months).             Passed - Serum Potassium in past 12 months      Lab Results   Component Value Date    Potassium 4.4 12/18/2019             Passed - Serum Sodium in past 12 months      Lab Results   Component Value Date    Sodium 143 12/18/2019             Passed - Blood pressure on file in past 12 months     BP Readings from Last 1 Encounters:   12/18/19 136/70             Passed - Serum Creatinine in past 12 months      Creatinine   Date Value Ref Range Status   12/18/2019 1.44 (H) 0.70 - 1.30 mg/dL Final

## 2021-06-04 NOTE — PROGRESS NOTES
Assessment:  1.  Type 2 diabetes mellitus, checking status.  2.  Hypertension controlled.  3.  Hyperlipidemia checking status.  4.  Aortic stenosis.  5.  Left forearm skin lesion very suspicious for cancer.    Plan: Check fasting A1c, lipid hepatic and basic profiles.  Have him schedule for excision of the left forearm skin lesion as he prefers to do that here rather than see dermatologist.  Continue current medication and refills as needed in the meantime.    Subjective: 89-year-old male presenting for follow-up on the above.  Regarding diabetes he notes he is checking blood sugars and usually the readings are in the 80-1 20 type range in the morning when he is fasting.  He notes this morning was 60 and he took several glucose tablets before coming for his appointment.  Regarding hypertension no headaches or dizziness or leg swelling.  Regarding lipids no diarrhea constipation muscle aching or muscle weakness.  He does have a spot on the skin on his left forearm that he would like checked because he thinks it is concerning.  He notes it came up 1 to 2 months ago.  He has had a previous basal cell cancer on his back.  Patient Active Problem List   Diagnosis     Basal Cell Carcinoma Of The Skin Of The Trunk     Hyperlipidemia     Diabetes mellitus (H)     Benign Essential Hypertension     Retinal hemorrhage, left     Cataract     Central retinal vein occlusion of left eye     Glaucoma     Diabetic peripheral neuropathy associated with type 2 diabetes mellitus (H)     Cervical kyphosis     Aortic stenosis     Major depression in complete remission (H)     Past Medical History:   Diagnosis Date     Depression      Diabetes (H)      Diabetes (H)      Hyperlipidemia      Hypertension      No Known Allergies  Current Outpatient Medications   Medication Sig Dispense Refill     aspirin 81 MG EC tablet Take 81 mg by mouth bedtime.       escitalopram oxalate (LEXAPRO) 5 MG tablet TAKE 1 TABLET BY MOUTH EVERY DAY 90 tablet 2  "    furosemide (LASIX) 20 MG tablet TAKE 1 TABLET (20 MG TOTAL) BY MOUTH DAILY. 90 tablet 2     gabapentin (NEURONTIN) 600 MG tablet Take 1 tablet (600 mg total) by mouth at bedtime. 90 tablet 3     glipiZIDE (GLUCOTROL) 5 MG tablet TAKE 1 TABLET (5 MG TOTAL) BY MOUTH 2 (TWO) TIMES A DAY BEFORE MEALS. 120 tablet 1     hydroCHLOROthiazide (MICROZIDE) 12.5 mg capsule TAKE 1 CAPSULE BY MOUTH EVERY DAY 90 capsule 2     latanoprost (XALATAN) 0.005 % ophthalmic solution Administer 1 drop to both eyes bedtime.       lisinopril (PRINIVIL,ZESTRIL) 20 MG tablet TAKE 1 TABLET BY MOUTH EVERY DAY 90 tablet 3     lovastatin (MEVACOR) 20 MG tablet TAKE 1 TABLET (20 MG TOTAL) BY MOUTH EVERY EVENING. 90 tablet 3     metFORMIN (GLUCOPHAGE) 500 MG tablet TAKE 2 TABLETS BY MOUTH TWICE A DAY WITH FOOD 240 tablet 0     metoprolol succinate (TOPROL-XL) 25 MG TAKE 1 TABLET (25 MG TOTAL) BY MOUTH DAILY. 90 tablet 2     ONETOUCH VERIO strips USE TO TEST BLOOD SUGAR ONCE DAILY BEFORE BREAKFAST. 100 strip 3     potassium chloride (KLOR-CON M20) 20 MEQ tablet Take 1 tablet (20 mEq total) by mouth daily. 90 tablet 1     sodium chloride (OCEAN) 0.65 % nasal spray 1 spray into each nostril as needed for congestion.       verapamil (VERELAN PM) 240 MG 24 hr capsule Take 1 capsule (240 mg total) by mouth daily. 90 capsule 3     No current facility-administered medications for this visit.      All other review of systems are negative for any other changes.    Objective:/70   Pulse 66   Resp 18   Ht 5' 10\" (1.778 m)   Wt 148 lb 8 oz (67.4 kg)   SpO2 96%   BMI 21.31 kg/m    HEENT shows no acute change.  Neck supple.  Lungs clear.  Heart regular rate and rhythm with systolic murmur consistent with known aortic stenosis.  Abdomen negative.  No pitting edema at the ankle.  He does have some kyphosis.  Left proximal forearm skin lesion about 5 to 6 mm diameter, elevated and irregular, certainly suspicious for potential skin cancer.  "

## 2021-06-04 NOTE — TELEPHONE ENCOUNTER
Refill Approved    Rx renewed per Medication Renewal Policy. Medication was last renewed on 3/14/19.    Brianna Bird, [facility] Triage/Med Refill 12/16/2019     Requested Prescriptions   Pending Prescriptions Disp Refills     metoprolol succinate (TOPROL-XL) 25 MG [Pharmacy Med Name: METOPROLOL SUCC ER 25 MG TAB] 90 tablet 3     Sig: TAKE 1 TABLET (25 MG TOTAL) BY MOUTH DAILY.       Beta-Blockers Refill Protocol Passed - 12/15/2019  3:14 PM        Passed - PCP or prescribing provider visit in past 12 months or next 3 months     Last office visit with prescriber/PCP: 8/21/2019 Parrish Silva MD OR same dept: 8/21/2019 Parrish Silva MD OR same specialty: 8/21/2019 Parrish Silva MD  Last physical: Visit date not found Last MTM visit: Visit date not found   Next visit within 3 mo: Visit date not found  Next physical within 3 mo: Visit date not found  Prescriber OR PCP: Parrish Silva MD  Last diagnosis associated with med order: 1. Benign Essential Hypertension  - metoprolol succinate (TOPROL-XL) 25 MG [Pharmacy Med Name: METOPROLOL SUCC ER 25 MG TAB]; TAKE 1 TABLET (25 MG TOTAL) BY MOUTH DAILY.  Dispense: 90 tablet; Refill: 3    If protocol passes may refill for 12 months if within 3 months of last provider visit (or a total of 15 months).             Passed - Blood pressure filed in past 12 months     BP Readings from Last 1 Encounters:   08/21/19 150/90

## 2021-06-04 NOTE — TELEPHONE ENCOUNTER
RN cannot approve Refill Request    RN can NOT refill this medication PCP messaged that patient is overdue for Labs. Last office visit: 12/18/2019 Parrish Silva MD Last Physical: Visit date not found Last MTM visit: Visit date not found Last visit same specialty: 12/18/2019 Parrish Silva MD.  Next visit within 3 mo: Visit date not found  Next physical within 3 mo: Visit date not found      Myra Goodwin, [facility] Triage/Med Refill 1/6/2020    Requested Prescriptions   Pending Prescriptions Disp Refills     glipiZIDE (GLUCOTROL) 5 MG tablet [Pharmacy Med Name: GLIPIZIDE 5 MG TABLET] 120 tablet 1     Sig: TAKE 1 TABLET (5 MG TOTAL) BY MOUTH 2 (TWO) TIMES A DAY BEFORE MEALS.       Oral Hypoglycemics Refill Protocol Failed - 1/6/2020  1:59 AM        Failed - Microalbumin in last year      Microalbumin, Random Urine   Date Value Ref Range Status   03/03/2010 12 1 - 20 mg/L Final                  Passed - Visit with PCP or prescribing provider visit in last 6 months       Last office visit with prescriber/PCP: 12/18/2019 OR same dept: 12/18/2019 Parrish Silva MD OR same specialty: 12/18/2019 Parrish Silva MD Last physical: Visit date not found Last MTM visit: Visit date not found         Next appt within 3 mo: Visit date not found  Next physical within 3 mo: Visit date not found  Prescriber OR PCP: Parrish Silva MD  Last diagnosis associated with med order: 1. Type 2 diabetes mellitus with diabetic neuropathy, without long-term current use of insulin (H)  - glipiZIDE (GLUCOTROL) 5 MG tablet [Pharmacy Med Name: GLIPIZIDE 5 MG TABLET]; TAKE 1 TABLET (5 MG TOTAL) BY MOUTH 2 (TWO) TIMES A DAY BEFORE MEALS.  Dispense: 120 tablet; Refill: 1     If protocol passes may refill for 12 months if within 3 months of last provider visit (or a total of 15 months).           Passed - A1C in last 6 months     Hemoglobin A1c   Date Value Ref Range Status   12/18/2019 6.6 (H) 3.5 - 6.0 % Final                Passed - Blood pressure in last year     BP Readings from Last 1 Encounters:   12/18/19 136/70             Passed - Serum creatinine in last year     Creatinine   Date Value Ref Range Status   12/18/2019 1.44 (H) 0.70 - 1.30 mg/dL Final

## 2021-06-05 NOTE — TELEPHONE ENCOUNTER
Refill Approved    Rx renewed per Medication Renewal Policy. Medication was last renewed on 4/29/19.    Brianna Bird, [facility] Triage/Med Refill 1/20/2020     Requested Prescriptions   Pending Prescriptions Disp Refills     hydroCHLOROthiazide (MICROZIDE) 12.5 mg capsule [Pharmacy Med Name: HYDROCHLOROTHIAZIDE 12.5 MG CP] 90 capsule 2     Sig: TAKE 1 CAPSULE BY MOUTH EVERY DAY       Diuretics/Combination Diuretics Refill Protocol  Passed - 1/19/2020  1:20 AM        Passed - Visit with PCP or prescribing provider visit in past 12 months     Last office visit with prescriber/PCP: 1/13/2020 Parrish Silva MD OR same dept: 1/13/2020 Parrish Silva MD OR same specialty: 1/13/2020 Parrish Silva MD  Last physical: Visit date not found Last MTM visit: Visit date not found   Next visit within 3 mo: Visit date not found  Next physical within 3 mo: Visit date not found  Prescriber OR PCP: Parrish Silva MD  Last diagnosis associated with med order: 1. Benign Essential Hypertension  - hydroCHLOROthiazide (MICROZIDE) 12.5 mg capsule [Pharmacy Med Name: HYDROCHLOROTHIAZIDE 12.5 MG CP]; TAKE 1 CAPSULE BY MOUTH EVERY DAY  Dispense: 90 capsule; Refill: 2    If protocol passes may refill for 12 months if within 3 months of last provider visit (or a total of 15 months).             Passed - Serum Potassium in past 12 months      Lab Results   Component Value Date    Potassium 4.4 12/18/2019             Passed - Serum Sodium in past 12 months      Lab Results   Component Value Date    Sodium 143 12/18/2019             Passed - Blood pressure on file in past 12 months     BP Readings from Last 1 Encounters:   01/13/20 150/78             Passed - Serum Creatinine in past 12 months      Creatinine   Date Value Ref Range Status   12/18/2019 1.44 (H) 0.70 - 1.30 mg/dL Final

## 2021-06-05 NOTE — TELEPHONE ENCOUNTER
Refill Approved    Rx renewed per Medication Renewal Policy. Medication was last renewed on 12.18.19.    Myra Goodwin, [facility] Triage/Med Refill 1/14/2020     Requested Prescriptions   Pending Prescriptions Disp Refills     escitalopram oxalate (LEXAPRO) 5 MG tablet [Pharmacy Med Name: ESCITALOPRAM 5 MG TABLET] 90 tablet 2     Sig: TAKE 1 TABLET BY MOUTH EVERY DAY       [facility] Refill Protocol  Passed - 1/12/2020  1:08 AM        Passed - PCP or prescribing provider visit in last year     Last office visit with prescriber/PCP: 12/18/2019 Parrish Silva MD OR same dept: 12/18/2019 Parrish Silva MD OR same specialty: 12/18/2019 Parrish Silva MD  Last physical: Visit date not found Last MTM visit: Visit date not found   Next visit within 3 mo: 1/13/2020 Parrish Silva MD  Next physical within 3 mo: Visit date not found  Prescriber OR PCP: Parrish Silva MD  Last diagnosis associated with med order: 1. Mild episode of recurrent major depressive disorder (H)  - escitalopram oxalate (LEXAPRO) 5 MG tablet [Pharmacy Med Name: ESCITALOPRAM 5 MG TABLET]; TAKE 1 TABLET BY MOUTH EVERY DAY  Dispense: 90 tablet; Refill: 2    If protocol passes may refill for 12 months if within 3 months of last provider visit (or a total of 15 months).

## 2021-06-05 NOTE — PROGRESS NOTES
Assessment:  1.  Left forearm skin lesion excision.  Probable neoplasm.    Plan: Keep the area clean and dry.  Return to clinic in 1 week for suture removal.  Check path.  Call return earlier if any difficulty.  He understands and agrees.    Subjective: 89-year-old male presenting for excision of the left forearm skin lesion.  See the note from several weeks ago.  He notes it has not healed up and it continues to be irritated.  He has kept a Band-Aid over it to keep it from draining.  He understands the risk of infection bleeding etc., all questions answered to his fat satisfaction, and he desires to proceed with the excision.  Patient Active Problem List   Diagnosis     Basal Cell Carcinoma Of The Skin Of The Trunk     Hyperlipidemia     Diabetes mellitus (H)     Benign Essential Hypertension     Retinal hemorrhage, left     Cataract     Central retinal vein occlusion of left eye     Glaucoma     Diabetic peripheral neuropathy associated with type 2 diabetes mellitus (H)     Cervical kyphosis     Aortic stenosis     Major depression in complete remission (H)     Past Medical History:   Diagnosis Date     Depression      Diabetes (H)      Diabetes (H)      Hyperlipidemia      Hypertension      No Known Allergies  Current Outpatient Medications   Medication Sig Dispense Refill     aspirin 81 MG EC tablet Take 81 mg by mouth bedtime.       escitalopram oxalate (LEXAPRO) 5 MG tablet TAKE 1 TABLET BY MOUTH EVERY DAY 90 tablet 2     furosemide (LASIX) 20 MG tablet TAKE 1 TABLET (20 MG TOTAL) BY MOUTH DAILY. 90 tablet 3     gabapentin (NEURONTIN) 600 MG tablet Take 1 tablet (600 mg total) by mouth at bedtime. 90 tablet 3     glipiZIDE (GLUCOTROL) 5 MG tablet TAKE 1 TABLET (5 MG TOTAL) BY MOUTH 2 (TWO) TIMES A DAY BEFORE MEALS. 120 tablet 1     hydroCHLOROthiazide (MICROZIDE) 12.5 mg capsule TAKE 1 CAPSULE BY MOUTH EVERY DAY 90 capsule 2     latanoprost (XALATAN) 0.005 % ophthalmic solution Administer 1 drop to both eyes  bedtime.       lisinopril (PRINIVIL,ZESTRIL) 20 MG tablet TAKE 1 TABLET BY MOUTH EVERY DAY 90 tablet 3     lovastatin (MEVACOR) 20 MG tablet TAKE 1 TABLET (20 MG TOTAL) BY MOUTH EVERY EVENING. 90 tablet 3     metFORMIN (GLUCOPHAGE) 500 MG tablet TAKE 2 TABLETS BY MOUTH TWICE A DAY WITH FOOD 360 tablet 1     metoprolol succinate (TOPROL-XL) 25 MG TAKE 1 TABLET (25 MG TOTAL) BY MOUTH DAILY. 90 tablet 2     potassium chloride (KLOR-CON M20) 20 MEQ tablet Take 1 tablet (20 mEq total) by mouth daily. 90 tablet 1     sodium chloride (OCEAN) 0.65 % nasal spray 1 spray into each nostril as needed for congestion.       verapamil (VERELAN PM) 240 MG 24 hr capsule Take 1 capsule (240 mg total) by mouth daily. 90 capsule 3     ONETOUCH VERIO strips USE TO TEST BLOOD SUGAR ONCE DAILY BEFORE BREAKFAST. 100 strip 3     No current facility-administered medications for this visit.      Objective:/83   Pulse 60   Temp 97.8  F (36.6  C) (Oral)   Resp 24   Wt 147 lb (66.7 kg)   SpO2 96%   BMI 21.09 kg/m    Alert elderly male in no acute distress.  After obtaining informed consent, he laid down and was supine on the procedure table.  On the left proximal forearm, there is a 1.2 cm skin lesion that is somewhat irregular on its surface as well as irregular at the periphery.  There is no pigmentation..  I numbed the area with 1% Xylocaine with epinephrine, total of 2.5 cc.  I made a elliptical excision with the long axis perpendicular to the arm and excised the lesion in entirety.  There is at least 2 mm of normal skin between the edge of the lesion and the smaller axis of the ellipse.  The excision was carried down into the subcutaneous fat.  Then I tied off one bleeder with 4-0 Vicryl with a stick tie.  I use the battery cautery on several smaller vessels.  Then I used 4 sutures of 4-0 Vicryl for subcutaneous closure to reduce wound tension.  Then I used 5 simple sutures of 4-0 Ethilon with good approximation of the wound.   Hemostasis achieved.  Estimated blood loss was about 1 cc.  Patient tolerated the procedure well.  Tissue was sent for path exam.

## 2021-06-05 NOTE — PROGRESS NOTES
Assessment:  1.  Suture removal, incision healing well, 1 week status post removal of squamous cell carcinoma in situ.    Plan: Keep area clean and dry for 1 more day.  He will follow-up as previously scheduled for his next diabetic recheck etc.  Call return earlier if any difficulties.  Reviewed normal wound healing and scar maturation.  If he sees any other lesions he will follow-up and when we see him for rechecks we can continue to monitor the skin.  I explained that the margins were clear and that this particular lesion would not be expected to recur.    Subjective: 89-year-old male presenting for suture removal after the excision done last week.  See that note.  He notes he is doing fine.  No drainage or pain in the area.  Patient Active Problem List   Diagnosis     Basal Cell Carcinoma Of The Skin Of The Trunk     Hyperlipidemia     Diabetes mellitus (H)     Benign Essential Hypertension     Retinal hemorrhage, left     Cataract     Central retinal vein occlusion of left eye     Glaucoma     Diabetic peripheral neuropathy associated with type 2 diabetes mellitus (H)     Cervical kyphosis     Aortic stenosis     Major depression in complete remission (H)     No Known Allergies  Current Outpatient Medications   Medication Sig Dispense Refill     aspirin 81 MG EC tablet Take 81 mg by mouth bedtime.       escitalopram oxalate (LEXAPRO) 5 MG tablet TAKE 1 TABLET BY MOUTH EVERY DAY 90 tablet 2     furosemide (LASIX) 20 MG tablet TAKE 1 TABLET (20 MG TOTAL) BY MOUTH DAILY. 90 tablet 3     gabapentin (NEURONTIN) 600 MG tablet Take 1 tablet (600 mg total) by mouth at bedtime. 90 tablet 3     glipiZIDE (GLUCOTROL) 5 MG tablet TAKE 1 TABLET (5 MG TOTAL) BY MOUTH 2 (TWO) TIMES A DAY BEFORE MEALS. 120 tablet 1     hydroCHLOROthiazide (MICROZIDE) 12.5 mg capsule TAKE 1 CAPSULE BY MOUTH EVERY DAY 90 capsule 2     latanoprost (XALATAN) 0.005 % ophthalmic solution Administer 1 drop to both eyes bedtime.       lisinopril  (PRINIVIL,ZESTRIL) 20 MG tablet TAKE 1 TABLET BY MOUTH EVERY DAY 90 tablet 3     lovastatin (MEVACOR) 20 MG tablet TAKE 1 TABLET (20 MG TOTAL) BY MOUTH EVERY EVENING. 90 tablet 3     metFORMIN (GLUCOPHAGE) 500 MG tablet TAKE 2 TABLETS BY MOUTH TWICE A DAY WITH FOOD 360 tablet 1     metoprolol succinate (TOPROL-XL) 25 MG TAKE 1 TABLET (25 MG TOTAL) BY MOUTH DAILY. 90 tablet 2     potassium chloride (KLOR-CON M20) 20 MEQ tablet Take 1 tablet (20 mEq total) by mouth daily. 90 tablet 1     sodium chloride (OCEAN) 0.65 % nasal spray 1 spray into each nostril as needed for congestion.       verapamil (VERELAN PM) 240 MG 24 hr capsule Take 1 capsule (240 mg total) by mouth daily. 90 capsule 3     ONETOUCH VERIO strips USE TO TEST BLOOD SUGAR ONCE DAILY BEFORE BREAKFAST. 100 strip 3     No current facility-administered medications for this visit.      Objective:/78   Pulse 82   Temp 97.6  F (36.4  C) (Oral)   Resp 26   Wt 148 lb (67.1 kg)   SpO2 92%   BMI 21.24 kg/m    The left proximal forearm area is healing very well.  The 5 sutures are removed and there is no drainage or abnormal erythema etc.      Recent Results (from the past 240 hour(s))   Surgical Pathology Exam   Result Value Ref Range    Case Report       Surgical Pathology Report                         Case: [id number]                                    Authorizing Provider:  Parrish Silva MD      Collected:           01/06/2020 1411              Ordering Location:     [facility]       Received:            01/06/2020 1411                                     Family Medicine/OB                                                           Pathologist:           Tina May MD                                                        Specimen:    Forearm, Left                                                                              Final Diagnosis       SKIN, LEFT FOREARM, EXCISION:    - SQUAMOUS CELL CARCINOMA IN SITU     - MARGINS  "NEGATIVE    Microscopic Description       Microscopic examination performed, substantiating the above diagnosis.    Clinical Information       Clinical history: Enlarging left forearm skin lesion,  Reason for procedure: To exclude cancer as well as remove nonhealing lesion    Gross Description       The specimen is received in formalin, labeled with the patient's name and \"left forearm,\" and consists of an ellipse of tan skin, 26 mm in length, 15 mm in greatest width, and 5 mm in greatest excision thickness. Nearly the entire surface of the skin is covered by an irregularly raised, granular, friable tan lesion, at least 12 mm in diameter, at the center of which is a 4 mm area of light brown crust or scab formation. The specimen is serially cross-sectioned and totally submitted in two cassettes, with margins inked blue. JPL:claudia    Charges CPT: 82739   ICD-10: D04.62  PQRS:      Result Flag       "

## 2021-06-07 NOTE — TELEPHONE ENCOUNTER
RN cannot approve Refill Request    RN can NOT refill this medication PCP messaged that patient is overdue for Labs. Last office visit: 1/13/2020 Parrish Silva MD Last Physical: Visit date not found Last MTM visit: Visit date not found Last visit same specialty: 1/13/2020 Parrish Silva MD.  Next visit within 3 mo: Visit date not found  Next physical within 3 mo: Visit date not found      Kamla Fernando, [facility] Triage/Med Refill 5/2/2020    Requested Prescriptions   Pending Prescriptions Disp Refills     glipiZIDE (GLUCOTROL) 5 MG tablet [Pharmacy Med Name: GLIPIZIDE 5 MG TABLET] 120 tablet 1     Sig: TAKE 1 TABLET (5 MG TOTAL) BY MOUTH 2 (TWO) TIMES A DAY BEFORE MEALS.       Oral Hypoglycemics Refill Protocol Failed - 5/1/2020  9:33 AM        Failed - Microalbumin in last year      Microalbumin, Random Urine   Date Value Ref Range Status   03/03/2010 12 1 - 20 mg/L Final                  Passed - Visit with PCP or prescribing provider visit in last 6 months       Last office visit with prescriber/PCP: 1/13/2020 OR same dept: 1/13/2020 Parrish Silva MD OR same specialty: 1/13/2020 Parrish Silva MD Last physical: Visit date not found Last MTM visit: Visit date not found         Next appt within 3 mo: Visit date not found  Next physical within 3 mo: Visit date not found  Prescriber OR PCP: Parrish Silva MD  Last diagnosis associated with med order: 1. Type 2 diabetes mellitus with diabetic neuropathy, without long-term current use of insulin (H)  - glipiZIDE (GLUCOTROL) 5 MG tablet [Pharmacy Med Name: GLIPIZIDE 5 MG TABLET]; TAKE 1 TABLET (5 MG TOTAL) BY MOUTH 2 (TWO) TIMES A DAY BEFORE MEALS.  Dispense: 120 tablet; Refill: 1     If protocol passes may refill for 12 months if within 3 months of last provider visit (or a total of 15 months).           Passed - A1C in last 6 months     Hemoglobin A1c   Date Value Ref Range Status   12/18/2019 6.6 (H) 3.5 - 6.0 % Final                Passed - Blood pressure in last year     BP Readings from Last 1 Encounters:   01/13/20 150/78             Passed - Serum creatinine in last year     Creatinine   Date Value Ref Range Status   12/18/2019 1.44 (H) 0.70 - 1.30 mg/dL Final

## 2021-06-07 NOTE — TELEPHONE ENCOUNTER
Spoke with Maureen- she will check with mono . Shes thinking this was an automatic request. She will call us back if his sugers go up

## 2021-06-07 NOTE — PROGRESS NOTES
"Wicho Zhao is a 89 y.o. male who is being evaluated via a billable telephone visit.      The patient has been notified of following:     \"This telephone visit will be conducted via a call between you and your physician/provider. We have found that certain health care needs can be provided without the need for a physical exam.  This service lets us provide the care you need with a short phone conversation.  If a prescription is necessary we can send it directly to your pharmacy.  If lab work is needed we can place an order for that and you can then stop by our lab to have the test done at a later time.    Telephone visits are billed at different rates depending on your insurance coverage. During this emergency period, for some insurers they may be billed the same as an in-person visit.  Please reach out to your insurance provider with any questions.    If during the course of the call the physician/provider feels a telephone visit is not appropriate, you will not be charged for this service.\"    Patient has given verbal consent to a Telephone visit? Yes    Patient would like to receive their AVS by AVS Preference: Juni.    Additional provider notes: 89-year-old male needing follow-up on multiple issues.  Regarding diabetes he is checking blood sugars at least once a day and sometimes twice a day.  He states that nearly always in the morning it is below 100.  He does state that sometimes he is getting low blood sugars and he had to take some sugar pills this morning because of a blood sugar of 62.  He notes that if he does not eat anything in the evening before going to bed and he has more trouble with the low blood sugar in the morning.  He has been on the metformin as well as the glipizide 5 mg twice daily.  Regarding lipids no constipation or muscle aching or muscle weakness but he does have an occasional somewhat looser stool about twice a week.  He is not having watery diarrhea but just a little bit " softer stool.  Regarding hypertension, no headaches or dizziness or leg swelling.  Patient Active Problem List   Diagnosis     Basal Cell Carcinoma Of The Skin Of The Trunk     Hyperlipidemia     Diabetes mellitus (H)     Benign Essential Hypertension     Retinal hemorrhage, left     Cataract     Central retinal vein occlusion of left eye     Glaucoma     Diabetic peripheral neuropathy associated with type 2 diabetes mellitus (H)     Cervical kyphosis     Aortic stenosis     Major depression in complete remission (H)     Past Medical History:   Diagnosis Date     Depression      Diabetes (H)      Diabetes (H)      Hyperlipidemia      Hypertension      No Known Allergies  Current Outpatient Medications on File Prior to Visit   Medication Sig Dispense Refill     aspirin 81 MG EC tablet Take 81 mg by mouth bedtime.       escitalopram oxalate (LEXAPRO) 5 MG tablet TAKE 1 TABLET BY MOUTH EVERY DAY 90 tablet 3     furosemide (LASIX) 20 MG tablet TAKE 1 TABLET (20 MG TOTAL) BY MOUTH DAILY. 90 tablet 3     gabapentin (NEURONTIN) 600 MG tablet Take 1 tablet (600 mg total) by mouth at bedtime. 90 tablet 3     hydroCHLOROthiazide (MICROZIDE) 12.5 mg capsule TAKE 1 CAPSULE BY MOUTH EVERY DAY 90 capsule 3     latanoprost (XALATAN) 0.005 % ophthalmic solution Administer 1 drop to both eyes bedtime.       lisinopril (PRINIVIL,ZESTRIL) 20 MG tablet TAKE 1 TABLET BY MOUTH EVERY DAY 90 tablet 3     lovastatin (MEVACOR) 20 MG tablet TAKE 1 TABLET (20 MG TOTAL) BY MOUTH EVERY EVENING. 90 tablet 3     metFORMIN (GLUCOPHAGE) 500 MG tablet TAKE 2 TABLETS BY MOUTH TWICE A DAY WITH FOOD 360 tablet 1     metoprolol succinate (TOPROL-XL) 25 MG TAKE 1 TABLET (25 MG TOTAL) BY MOUTH DAILY. 90 tablet 2     potassium chloride (KLOR-CON M20) 20 MEQ tablet Take 1 tablet (20 mEq total) by mouth daily. 90 tablet 1     sodium chloride (OCEAN) 0.65 % nasal spray 1 spray into each nostril as needed for congestion.       verapamil (VERELAN PM) 240 MG 24  hr capsule Take 1 capsule (240 mg total) by mouth daily. 90 capsule 3     [DISCONTINUED] glipiZIDE (GLUCOTROL) 5 MG tablet TAKE 1 TABLET (5 MG TOTAL) BY MOUTH 2 (TWO) TIMES A DAY BEFORE MEALS. 120 tablet 1     ONETOUCH VERIO strips USE TO TEST BLOOD SUGAR ONCE DAILY BEFORE BREAKFAST. 100 strip 3     No current facility-administered medications on file prior to visit.      He does not smoke.  Little alcohol.  All other review of systems are negative.    Assessment/Plan:  1. Type 2 diabetes mellitus with diabetic neuropathy, without long-term current use of insulin (H)     2. Mixed hyperlipidemia     3. Benign Essential Hypertension       I recommend that he stop the glipizide.  Continue to monitor blood sugars.  Call if any significant increase.  Continue other current medication.  Plan follow-up in 2 to 3 months for next visit and be fasting for lab return or call earlier as needed.  He understands and agrees.      Phone call duration:  8 minutes    Parrish Silva MD

## 2021-06-07 NOTE — TELEPHONE ENCOUNTER
Please call patient since at virtual visit on 4/22 I discontinued his glipizide. If pharmacy just automatically requesting refill, then refuse.  If he had blood sugars go up when he was off glipizide, then let me know those numbers before deciding on refill.

## 2021-06-08 NOTE — TELEPHONE ENCOUNTER
Refill Approved    Rx renewed per Medication Renewal Policy. Medication was last renewed on 6/17/19.    Brianna Bird, [facility] Triage/Med Refill 6/4/2020     Requested Prescriptions   Pending Prescriptions Disp Refills     lisinopriL (PRINIVIL,ZESTRIL) 20 MG tablet [Pharmacy Med Name: LISINOPRIL 20 MG TABLET] 90 tablet 3     Sig: TAKE 1 TABLET BY MOUTH EVERY DAY       Ace Inhibitors Refill Protocol Passed - 6/2/2020 12:30 AM        Passed - PCP or prescribing provider visit in past 12 months       Last office visit with prescriber/PCP: 1/13/2020 Parrish Silva MD OR same dept: 1/13/2020 Parrish Silva MD OR same specialty: 1/13/2020 Parrish Silva MD  Last physical: Visit date not found Last MTM visit: Visit date not found   Next visit within 3 mo: Visit date not found  Next physical within 3 mo: Visit date not found  Prescriber OR PCP: Parrish Silva MD  Last diagnosis associated with med order: 1. Benign essential hypertension  - lisinopriL (PRINIVIL,ZESTRIL) 20 MG tablet [Pharmacy Med Name: LISINOPRIL 20 MG TABLET]; TAKE 1 TABLET BY MOUTH EVERY DAY  Dispense: 90 tablet; Refill: 3    2. Hyperlipidemia  - lovastatin (MEVACOR) 20 MG tablet [Pharmacy Med Name: LOVASTATIN 20 MG TABLET]; TAKE 1 TABLET (20 MG TOTAL) BY MOUTH EVERY EVENING.  Dispense: 90 tablet; Refill: 3    If protocol passes may refill for 12 months if within 3 months of last provider visit (or a total of 15 months).             Passed - Serum Potassium in past 12 months     Lab Results   Component Value Date    Potassium 4.4 12/18/2019             Passed - Blood pressure filed in past 12 months     BP Readings from Last 1 Encounters:   01/13/20 150/78             Passed - Serum Creatinine in past 12 months     Creatinine   Date Value Ref Range Status   12/18/2019 1.44 (H) 0.70 - 1.30 mg/dL Final                lovastatin (MEVACOR) 20 MG tablet [Pharmacy Med Name: LOVASTATIN 20 MG TABLET] 90 tablet 3     Sig: TAKE 1 TABLET (20 MG  TOTAL) BY MOUTH EVERY EVENING.       Statins Refill Protocol (Hmg CoA Reductase Inhibitors) Passed - 6/2/2020 12:30 AM        Passed - PCP or prescribing provider visit in past 12 months      Last office visit with prescriber/PCP: 1/13/2020 Parrish Silva MD OR same dept: 1/13/2020 Parrish Silva MD OR same specialty: 1/13/2020 Parrish Silva MD  Last physical: Visit date not found Last MTM visit: Visit date not found   Next visit within 3 mo: Visit date not found  Next physical within 3 mo: Visit date not found  Prescriber OR PCP: Parrish Silva MD  Last diagnosis associated with med order: 1. Benign essential hypertension  - lisinopriL (PRINIVIL,ZESTRIL) 20 MG tablet [Pharmacy Med Name: LISINOPRIL 20 MG TABLET]; TAKE 1 TABLET BY MOUTH EVERY DAY  Dispense: 90 tablet; Refill: 3    2. Hyperlipidemia  - lovastatin (MEVACOR) 20 MG tablet [Pharmacy Med Name: LOVASTATIN 20 MG TABLET]; TAKE 1 TABLET (20 MG TOTAL) BY MOUTH EVERY EVENING.  Dispense: 90 tablet; Refill: 3    If protocol passes may refill for 12 months if within 3 months of last provider visit (or a total of 15 months).

## 2021-06-09 NOTE — PROGRESS NOTES
"Chief Complaint   Patient presents with     Diabetes     DM check     hard spot on back     He just knows its there. Feels it when sitting on a chair.       HPI: Delightful 89-year-old male with a past history of type 2 diabetes presents today for diabetic recheck.  He is doing well.  He checks his blood sugar every morning and this morning it was 82 and it averages about 100-112.  Interestingly his weight is gone from 1 47-1 39.  He is  and lives alone.    He has a lesion on his left back just below the scapula he would like me look at.  It is a \"lump\".    ROS: No hypoglycemic episodes.  No neuropathy reported.    SH: Reviewed-see Snapshot for review.     FH: Reviewed-see Snapshot for review.    Meds:  Wicho has a current medication list which includes the following prescription(s): aspirin, escitalopram oxalate, furosemide, gabapentin, hydrochlorothiazide, klor-con m20, latanoprost, lisinopril, lovastatin, metformin, metoprolol succinate, onetouch verio test strips, sodium chloride, and verapamil.    O:  /80   Pulse (!) 55   Temp 97.8  F (36.6  C)   Resp 16   Wt 139 lb 1 oz (63.1 kg)   SpO2 (!) 87%   BMI 19.95 kg/m    Constitutional:    --Vitals as above  --No acute distress  Eyes-  --Sclera noninjected  --Lids and conjunctiva normal  Skin-  --Pink and dry except for a 1/2 cm round firm lipoma just below the left scapula    A/P:   1. Type 2 diabetes mellitus without complication, with long-term current use of insulin (H)  His diabetes is stable and will continue metformin.  Check A1c today.  - Glycosylated Hemoglobin A1c  - HM2(CBC w/o Differential)    2. Lipoma of skin and subcutaneous tissue  Discussed lipoma and excision and patient will wait at this time.    3.  Weight loss  -The patient had a mild weight loss which may not be significant but should be monitored at next visit.    RTC 6 months  "

## 2021-06-09 NOTE — TELEPHONE ENCOUNTER
RN cannot approve Refill Request    RN can NOT refill this medication PCP messaged that patient is overdue for Labs. Last office visit: 1/13/2020 Parrish Silva MD Last Physical: Visit date not found Last MTM visit: Visit date not found Last visit same specialty: 1/13/2020 Parrish Silva MD.  Next visit within 3 mo: Visit date not found  Next physical within 3 mo: Visit date not found      Kamla Fernando, [facility] Triage/Med Refill 7/3/2020    Requested Prescriptions   Pending Prescriptions Disp Refills     metFORMIN (GLUCOPHAGE) 500 MG tablet [Pharmacy Med Name: METFORMIN  MG TABLET] 360 tablet 1     Sig: TAKE 2 TABLETS BY MOUTH TWICE A DAY WITH FOOD       Metformin Refill Protocol Failed - 7/2/2020 12:46 AM        Failed - A1C in last 6 months     Hemoglobin A1c   Date Value Ref Range Status   07/02/2020 7.3 (H) 3.5 - 6.0 % Final               Failed - Microalbumin in last year      Microalbumin, Random Urine   Date Value Ref Range Status   03/03/2010 12 1 - 20 mg/L Final                  Passed - Blood pressure in last 12 months     BP Readings from Last 1 Encounters:   07/02/20 150/80             Passed - LFT or AST or ALT in last 12 months     Albumin   Date Value Ref Range Status   12/18/2019 3.7 3.5 - 5.0 g/dL Final     Bilirubin, Total   Date Value Ref Range Status   12/18/2019 0.4 0.0 - 1.0 mg/dL Final     Bilirubin, Direct   Date Value Ref Range Status   12/18/2019 0.1 <=0.5 mg/dL Final     Alkaline Phosphatase   Date Value Ref Range Status   12/18/2019 80 45 - 120 U/L Final     AST   Date Value Ref Range Status   12/18/2019 16 0 - 40 U/L Final     ALT   Date Value Ref Range Status   12/18/2019 11 0 - 45 U/L Final     Protein, Total   Date Value Ref Range Status   12/18/2019 6.5 6.0 - 8.0 g/dL Final                Passed - GFR or Serum Creatinine in last 6 months     GFR MDRD Non Af Amer   Date Value Ref Range Status   12/18/2019 46 (L) >60 mL/min/1.73m2 Final     GFR MDRD Af Amer    Date Value Ref Range Status   12/18/2019 56 (L) >60 mL/min/1.73m2 Final             Passed - Visit with PCP or prescribing provider visit in last 6 months or next 3 months     Last office visit with prescriber/PCP: 1/13/2020 OR same dept: 1/13/2020 Parrish Silva MD OR same specialty: 1/13/2020 Parrish Silva MD Last physical: Visit date not found Last MTM visit: Visit date not found         Next appt within 3 mo: Visit date not found  Next physical within 3 mo: Visit date not found  Prescriber OR PCP: Parrish Silva MD  Last diagnosis associated with med order: 1. Type 2 diabetes mellitus with diabetic neuropathy, without long-term current use of insulin (H)  - metFORMIN (GLUCOPHAGE) 500 MG tablet [Pharmacy Med Name: METFORMIN  MG TABLET]; TAKE 2 TABLETS BY MOUTH TWICE A DAY WITH FOOD  Dispense: 360 tablet; Refill: 1     If protocol passes may refill for 12 months if within 3 months of last provider visit (or a total of 15 months).

## 2021-06-09 NOTE — PROGRESS NOTES
"   Visit Vitals     /72     Pulse 82     Resp 16     Ht 6' 1\" (1.854 m)     Wt 150 lb (68 kg)     BMI 19.79 kg/m2       Wicho Zhao is a 89 y.o. male here for physical.  He has no specific concerns about his health.  He is continuing to follow-up with the eye doctor regarding the vision in his left eye.  He denies any chest pains or shortness of breath he is not interested in any kind of cancer screening.  He is the main  as his wife is no longer driving but she does do her own ADLs.    He has had no congestion cough or other symptoms no shortness of breath.  He does not do much in the way of walking or exercising his 89th birthday is tomorrow.  He is up-to-date on immunizations.  Active Ambulatory Problems     Diagnosis Date Noted     Depression      Basal Cell Carcinoma Of The Skin Of The Trunk      Non-neoplastic Nevus      Lump In / On The Skin      Hyperlipidemia      Diabetes mellitus      Benign Essential Hypertension      Major depressive disorder, recurrent episode, unspecified 08/12/2015     Retinal hemorrhage, left 02/03/2016     Cataract 02/03/2016     Retinal vein occlusion, central, left 02/03/2016     Glaucoma 02/03/2016     Diabetic peripheral neuropathy associated with type 2 diabetes mellitus 03/29/2016     Resolved Ambulatory Problems     Diagnosis Date Noted     No Resolved Ambulatory Problems     Past Medical History:   Diagnosis Date     Depression      Diabetes      Diabetes      Hyperlipidemia      Hypertension      Past Surgical History:   Procedure Laterality Date     CHOLECYSTECTOMY OPEN       OK REMOVAL GALLBLADDER      Description: Cholecystectomy;  Recorded: 04/23/2012;     OK REPAIR ACHILLES TENDON,PRIMARY      Description: Primary Repair Of Ruptured Achilles Tendon;  Recorded: 04/23/2012;     Family History   Problem Relation Age of Onset     Hypertension Mother      Diabetes Father        Current Outpatient Prescriptions:      aspirin 81 MG EC tablet, Take 81 mg by " mouth bedtime., Disp: , Rfl:      atenolol (TENORMIN) 25 MG tablet, TAKE 1 TABLET BY MOUTH ONE TIME DAILY, Disp: 90 tablet, Rfl: 0     blood glucose test (CONTOUR NEXT STRIPS) strips, Test blood sugar once per day. Contour Next test strips, Disp: 50 each, Rfl: 11     blood-glucose meter (CONTOUR NEXT EZ METER) Misc, Use to test blood sugar once per day., Disp: 1 each, Rfl: 1     gabapentin (NEURONTIN) 600 MG tablet, TAKE ONE TABLET BY MOUTH NIGHTLY AT BEDTIME, Disp: 90 tablet, Rfl: 3     gabapentin (NEURONTIN) 600 MG tablet, TAKE ONE TABLET BY MOUTH NIGHTLY AT BEDTIME, Disp: 90 tablet, Rfl: 2     glipiZIDE-metFORMIN (METAGLIP) 5-500 mg per tablet, TAKE TWO TABLETS BY MOUTH TWICE DAILY, Disp: 360 tablet, Rfl: 1     hydrochlorothiazide (HYDRODIURIL) 12.5 MG tablet, TAKE ONE TABLET BY MOUTH ONE TIME DAILY, Disp: 90 tablet, Rfl: 3     hydroCHLOROthiazide (HYDRODIURIL) 12.5 MG tablet, TAKE ONE TABLET BY MOUTH ONE TIME DAILY, Disp: 90 tablet, Rfl: 2     latanoprost (XALATAN) 0.005 % ophthalmic solution, Administer 1 drop to both eyes bedtime., Disp: , Rfl:      lisinopril (PRINIVIL,ZESTRIL) 20 MG tablet, TAKE ONE TABLET BY MOUTH ONE TIME DAILY, Disp: 90 tablet, Rfl: 1     lovastatin (MEVACOR) 20 MG tablet, TAKE 1 TABLET BY MOUTH ONE TIME DAILY, Disp: 90 tablet, Rfl: 0     PARoxetine (PAXIL) 20 MG tablet, TAKE ONE TABLET BY MOUTH ONE TIME DAILY, Disp: 90 tablet, Rfl: 0     PARoxetine (PAXIL) 20 MG tablet, TAKE ONE TABLET BY MOUTH ONE TIME DAILY, Disp: 90 tablet, Rfl: 0     potassium chloride 20 mEq TbER, TAKE ONE TABLET BY MOUTH ONE TIME DAILY, Disp: 30 tablet, Rfl: 5     potassium chloride SA (K-DUR,KLOR-CON) 20 MEQ tablet, Take 1 tablet (20 mEq total) by mouth daily., Disp: 30 tablet, Rfl: 5     sodium chloride (OCEAN) 0.65 % nasal spray, 1 spray into each nostril as needed for congestion., Disp: , Rfl:      verapamil (VERELAN PM) 240 MG 24 hr capsule, TAKE ONE CAPSULE BY MOUTH ONE TIME DAILY, Disp: 90 capsule, Rfl: 3      gabapentin (NEURONTIN) 100 MG capsule, Take 1 capsule TID for foot pain/burning(in addition to bedtime dose), Disp: 90 capsule, Rfl: 2  No Known Allergies  Immunization History   Administered Date(s) Administered     DT (pediatric) 06/01/1995, 08/29/2005     Influenza high dose, seasonal 09/22/2015, 10/18/2016     Influenza, inj, historic 10/16/2007, 11/04/2008     Influenza, seasonal,quad inj 6-35 mos 10/06/2009, 10/05/2010, 10/05/2011, 09/11/2012     Influenza,seasonal quad, PF 10/04/2013, 10/08/2014     Pneumo Conj 13-V (2010&after) 09/22/2015     Pneumo Polysac 23-V 10/01/1996, 01/24/2005     Td, historic 08/29/2005     Tdap 08/29/2016     ZOSTER 04/29/2008     Social History     Social History     Marital status:      Spouse name: N/A     Number of children: N/A     Years of education: N/A     Occupational History     Not on file.     Social History Main Topics     Smoking status: Former Smoker     Smokeless tobacco: Never Used      Comment: 40 + yr ago quit     Alcohol use Yes      Comment: 1 beer per month     Drug use: No     Sexual activity: Not on file     Other Topics Concern     Not on file     Social History Narrative     Habits: He is a non-smoker but did smoke in the distant past, he has rare alcohol user  His review of systems all otherwise negative    General Appearance:    Alert, cooperative, no distress, appears stated age he does have moderate kyphosis of his cervical spine and has some difficulty looking straight he has some opaqueness to his left eye lens that suggests cataract.     Head:    Normocephalic, without obvious abnormality, atraumatic   Eyes:    PERRL, conjunctiva/corneas clear, EOM's intact, fundi     benign, both eyes        Ears:    Normal TM's and external ear canals, both ears   Nose:   Nares normal, septum midline, mucosa normal, no drainage    or sinus tenderness   Throat:   Lips, mucosa, and tongue normal; teeth and gums normal   Neck:   Supple, symmetrical, trachea  midline, no adenopathy;        thyroid:  No enlargement/tenderness/nodules; no carotid    bruit or JVD   Back:     Symmetric, no curvature, ROM normal, no CVA tenderness   Lungs:     He has crackling into his lung base left much greater than right otherwise clear    Chest wall:    No tenderness or deformity   Heart:    Regular rate and rhythm, S1 and S2 normal, is a 2/6 murmur best heard at the left lower sternal border    Abdomen:     Soft, non-tender, bowel sounds active all four quadrants,     no masses, no organomegaly   Genitalia:   he declines    Rectal:   he declines    Extremities:   Extremities normal, atraumatic, no cyanosis or edema   Pulses:   2+ and symmetric all extremities   Skin:   Skin color, texture, turgor normal, no rashes or lesions   Lymph nodes:   Cervical, supraclavicular, and axillary nodes normal   Neurologic:   CNII-XII intact. Normal strength, sensation and reflexes       throughout he has a slightly positive Romberg otherwise negative    Labs pending, x-ray shows what appears to be a second heart shadow more on the left that suggests possible pericardial effusion with moderate kyphosis otherwise negative    Assessment: Annual physical diabetes hypertension hypercholesterolemia heart murmur?  Aortic stenosis cervical kyphosis with left lung base crackles?  Pneumonia versus pericardial effusion    Plan: We will call with labs when available we will start Zithromax set up an echocardiogram and notify him of chest x-ray report and he will follow-up here in 6 months or as needed reviewed home safety and discussed the use of a cane which he declines at this time

## 2021-06-10 NOTE — TELEPHONE ENCOUNTER
Refill Approved    Rx renewed per Medication Renewal Policy. Medication was last renewed on 8/10/19.    Brianna Bird, [facility] Triage/Med Refill 7/28/2020     Requested Prescriptions   Pending Prescriptions Disp Refills     gabapentin (NEURONTIN) 600 MG tablet [Pharmacy Med Name: GABAPENTIN 600 MG TABLET] 90 tablet 3     Sig: TAKE 1 TABLET (600 MG TOTAL) BY MOUTH AT BEDTIME.       Gabapentin/Levetiracetam/Tiagabine Refill Protocol  Passed - 7/26/2020 12:30 AM        Passed - PCP or prescribing provider visit in past 12 months or next 3 months     Last office visit with prescriber/PCP: 1/13/2020 Parrish Silva MD OR same dept: 7/2/2020 Batsheva Moralez MD OR same specialty: 7/2/2020 Batsheva Moralez MD  Last physical: Visit date not found Last MTM visit: Visit date not found   Next visit within 3 mo: Visit date not found  Next physical within 3 mo: Visit date not found  Prescriber OR PCP: Parrish Silva MD  Last diagnosis associated with med order: 1. Neuropathy  - gabapentin (NEURONTIN) 600 MG tablet [Pharmacy Med Name: GABAPENTIN 600 MG TABLET]; Take 1 tablet (600 mg total) by mouth at bedtime.  Dispense: 90 tablet; Refill: 3    If protocol passes may refill for 12 months if within 3 months of last provider visit (or a total of 15 months).

## 2021-06-10 NOTE — TELEPHONE ENCOUNTER
Refill Approved    Rx renewed per Medication Renewal Policy. Medication was last renewed on 8/24/2019.    Francheska Steele, [facility] Connection Triage/Med Refill 8/11/2020     Requested Prescriptions   Pending Prescriptions Disp Refills     verapamiL (VERELAN PM) 240 MG 24 hr capsule [Pharmacy Med Name: VERAPAMIL  MG CAPSULE] 90 capsule 3     Sig: TAKE 1 CAPSULE BY MOUTH EVERY DAY       Calcium-Channel Blockers Protocol Passed - 8/11/2020 12:36 AM        Passed - PCP or prescribing provider visit in past 12 months or next 3 months     Last office visit with prescriber/PCP: Visit date not found OR same dept: 7/2/2020 Batsheva Moralez MD OR same specialty: 7/2/2020 Batsheva Moralez MD  Last physical: Visit date not found Last MTM visit: Visit date not found   Next visit within 3 mo: Visit date not found  Next physical within 3 mo: Visit date not found  Prescriber OR PCP: Megan Stephenson MD  Last diagnosis associated with med order: 1. Essential hypertension, benign  - verapamiL (VERELAN PM) 240 MG 24 hr capsule [Pharmacy Med Name: VERAPAMIL  MG CAPSULE]; TAKE 1 CAPSULE BY MOUTH EVERY DAY  Dispense: 90 capsule; Refill: 3    If protocol passes may refill for 12 months if within 3 months of last provider visit (or a total of 15 months).             Passed - Blood pressure filed in past 12 months     BP Readings from Last 1 Encounters:   07/02/20 150/80

## 2021-06-11 NOTE — PROGRESS NOTES
Wicho Zhao is a 89-year-old with a history of diabetes hypertension hypercholesterolemia with chronic major depression and ongoing visual problems who presents today for follow-up.  We see recent labs.  He is continuing to monitor his blood sugars approximately 2 times a day.  They have been low recently and particularly in the morning.  He acknowledges he is not eating as much as he used to and his weight is down.  He denies any chest pains or shortness of breath.  He does continue to have a sense of numbness and irritation into his feet particularly at night but is no longer using gabapentin during the day.  He is still getting around without assistive device although he remarks that it was hard to get out from under the car where he was working on his car yesterday.    Objective:/68 (Patient Site: Left Arm, Patient Position: Sitting, Cuff Size: Adult Regular)  Pulse (!) 56  Wt 148 lb 3.2 oz (67.2 kg)  SpO2 96%  BMI 19.55 kg/m2  Neck supple without lymphadenopathy thyromegaly or bruits lungs he still has slight crackles in his lung bases left greater than right similar to previous.    heart with a regular rate and rhythm without murmur rub gallop normal S1-S2 lower extremities are free of edema skin appears normal  Neurological examinations nonfocal    Labs pending previous chest x-ray reviewed    Assessment: Diabetes hypertension hypercholesterolemia with diabetic neuropathy    Plan: I have asked him to try and be more watchful of his diet consider some cheese curds or cheese stick at bedtime to avoid further lows will call with labs when available reviewed home safety and he will follow-up here in 3 months or as needed

## 2021-06-11 NOTE — PROGRESS NOTES
ASSESSMENT/PLAN:       1. Infected sebaceous cyst  Wound care was discussed with the patient and his daughter will help with wound care.  He was told that the dressing needs to be changed daily for the next 5 days and then the packing could be removed.  After that keep it covered with a Band-Aid for a couple more days until it is closed up.  He will finish his antibiotic.  I do not expect that he will need to return.            Macario Calderon MD      PROGRESS NOTE   9/26/2020    SUBJECTIVE:  Wicho Zhao is a 89 y.o. male  who presents for   Chief Complaint   Patient presents with     Cyst     upper back      The patient is here today to have a sebaceous cyst on his back excised.  He has been on antibiotics for about a week and it is a little more soft and hurts last but otherwise not much change.  There is not been any drainage.  The patient has no known allergies and for blood thinners is only on low-dose aspirin daily.    Patient Active Problem List   Diagnosis     Basal Cell Carcinoma Of The Skin Of The Trunk     Hyperlipidemia     Diabetes mellitus (H)     Benign Essential Hypertension     Retinal hemorrhage, left     Cataract     Central retinal vein occlusion of left eye     Glaucoma     Diabetic peripheral neuropathy associated with type 2 diabetes mellitus (H)     Cervical kyphosis     Aortic stenosis     Major depression in complete remission (H)       Current Outpatient Medications   Medication Sig Dispense Refill     aspirin 81 MG EC tablet Take 81 mg by mouth bedtime.       escitalopram oxalate (LEXAPRO) 5 MG tablet TAKE 1 TABLET BY MOUTH EVERY DAY 90 tablet 3     furosemide (LASIX) 20 MG tablet TAKE 1 TABLET (20 MG TOTAL) BY MOUTH DAILY. 90 tablet 3     gabapentin (NEURONTIN) 600 MG tablet TAKE 1 TABLET (600 MG TOTAL) BY MOUTH AT BEDTIME. 90 tablet 3     hydroCHLOROthiazide (MICROZIDE) 12.5 mg capsule TAKE 1 CAPSULE BY MOUTH EVERY DAY 90 capsule 3     KLOR-CON M20 20 mEq tablet TAKE 1 TABLET BY  "MOUTH EVERY DAY 90 tablet 1     latanoprost (XALATAN) 0.005 % ophthalmic solution Administer 1 drop to both eyes bedtime.       lisinopriL (PRINIVIL,ZESTRIL) 20 MG tablet TAKE 1 TABLET BY MOUTH EVERY DAY 90 tablet 1     lovastatin (MEVACOR) 20 MG tablet TAKE 1 TABLET (20 MG TOTAL) BY MOUTH EVERY EVENING. 90 tablet 1     metFORMIN (GLUCOPHAGE) 500 MG tablet TAKE 2 TABLETS BY MOUTH TWICE A DAY WITH FOOD 360 tablet 1     metoprolol succinate (TOPROL-XL) 25 MG TAKE 1 TABLET (25 MG TOTAL) BY MOUTH DAILY. 90 tablet 2     ONETOUCH VERIO strips USE TO TEST BLOOD SUGAR ONCE DAILY BEFORE BREAKFAST. 100 strip 3     sodium chloride (OCEAN) 0.65 % nasal spray 1 spray into each nostril as needed for congestion.       verapamiL (VERELAN PM) 240 MG 24 hr capsule TAKE 1 CAPSULE BY MOUTH EVERY DAY 90 capsule 3     No current facility-administered medications for this visit.        Social History     Tobacco Use   Smoking Status Former Smoker   Smokeless Tobacco Never Used   Tobacco Comment    40 + yr ago quit           OBJECTIVE:        No results found for this or any previous visit (from the past 240 hour(s)).    Vitals:    09/23/20 1331   BP: 130/80   Pulse: 64   SpO2: 93%   Weight: 140 lb (63.5 kg)   Height: 5' 10\" (1.778 m)     Weight: 140 lb (63.5 kg)          Physical Exam:  GENERAL APPEARANCE: Very pleasant 89-year-old male, NAD, well hydrated, well nourished  SKIN:  Normal skin turgor, 2×2.5 cm sebaceous cyst back with now some fluctuance but no drainage and still very red.  HEENT: moist mucous membranes, no rhinorrhea  NECK: Normal without adenopathy or masses     EXTREMITY: no edema and full ROM of all joints  NEURO: no focal findings    Procedure note: Excision infected sebaceous cyst back  I explained to the patient how I would do the procedure.  Reviewed his allergies and medications and no contraindications to proceeding.  Informed consent was obtained from the patient.  With the patient in a lateral position with the " cyst viewable and identified the area was cleansed with Betadine and anesthetized with 1% Xylocaine with epinephrine.  An incision was made with a 15 blade and with sharp and blunt dissection I was able to remove the wall of the cyst and the contents of the cyst.  The area was irrigated with saline and Nu Gauze plain was inserted as packing quarter-inch about 6 inches total.  Patient tolerated the procedure well and there was good hemostasis and a pressure bandage was applied.

## 2021-06-11 NOTE — PROGRESS NOTES
ASSESSMENT/PLAN:       1. Infected sebaceous cyst, back     - cephalexin (KEFLEX) 500 MG capsule; Take 1 capsule (500 mg total) by mouth 3 (three) times a day for 7 days.  Dispense: 21 capsule; Refill: 0  The patient will return next week for an incision and drainage or an excision of the cyst.  If able apply some hot packs but be careful not to burn the skin.  Explained to the patient that often a sebaceous cyst starts out small and if is not infected there is no reason why it needs to be treated surgically.  It is causing enough symptoms for him that he would like to have it removed.  I did share with him that it is not a type of skin cancer.  Note that the patient does have diabetes and in July his last blood glucose was 204 but his glycohemoglobin was 7.3..        Macario Calderon MD      PROGRESS NOTE   9/19/2020    SUBJECTIVE:  Wicho Zhao is a 89 y.o. male  who presents for   Chief Complaint   Patient presents with     Cyst     center of back, hurts when leaning back on it , red swollen      The patient is here today because he has a lump on his back that is painful.  He recently had a physical exam and it was noted that he had a small lump on his back thought to possibly be a lipoma.  It is in an area that he cannot see but it definitely feels to be bigger now and now is painful.  There is not been any drainage from it.  He has never had anything like this before.    Patient Active Problem List   Diagnosis     Basal Cell Carcinoma Of The Skin Of The Trunk     Hyperlipidemia     Diabetes mellitus (H)     Benign Essential Hypertension     Retinal hemorrhage, left     Cataract     Central retinal vein occlusion of left eye     Glaucoma     Diabetic peripheral neuropathy associated with type 2 diabetes mellitus (H)     Cervical kyphosis     Aortic stenosis     Major depression in complete remission (H)       Current Outpatient Medications   Medication Sig Dispense Refill     aspirin 81 MG EC tablet Take 81  "mg by mouth bedtime.       escitalopram oxalate (LEXAPRO) 5 MG tablet TAKE 1 TABLET BY MOUTH EVERY DAY 90 tablet 3     furosemide (LASIX) 20 MG tablet TAKE 1 TABLET (20 MG TOTAL) BY MOUTH DAILY. 90 tablet 3     gabapentin (NEURONTIN) 600 MG tablet TAKE 1 TABLET (600 MG TOTAL) BY MOUTH AT BEDTIME. 90 tablet 3     hydroCHLOROthiazide (MICROZIDE) 12.5 mg capsule TAKE 1 CAPSULE BY MOUTH EVERY DAY 90 capsule 3     KLOR-CON M20 20 mEq tablet TAKE 1 TABLET BY MOUTH EVERY DAY 90 tablet 1     latanoprost (XALATAN) 0.005 % ophthalmic solution Administer 1 drop to both eyes bedtime.       lisinopriL (PRINIVIL,ZESTRIL) 20 MG tablet TAKE 1 TABLET BY MOUTH EVERY DAY 90 tablet 1     lovastatin (MEVACOR) 20 MG tablet TAKE 1 TABLET (20 MG TOTAL) BY MOUTH EVERY EVENING. 90 tablet 1     metFORMIN (GLUCOPHAGE) 500 MG tablet TAKE 2 TABLETS BY MOUTH TWICE A DAY WITH FOOD 360 tablet 1     metoprolol succinate (TOPROL-XL) 25 MG TAKE 1 TABLET (25 MG TOTAL) BY MOUTH DAILY. 90 tablet 2     ONETOUCH VERIO strips USE TO TEST BLOOD SUGAR ONCE DAILY BEFORE BREAKFAST. 100 strip 3     sodium chloride (OCEAN) 0.65 % nasal spray 1 spray into each nostril as needed for congestion.       verapamiL (VERELAN PM) 240 MG 24 hr capsule TAKE 1 CAPSULE BY MOUTH EVERY DAY 90 capsule 3     cephalexin (KEFLEX) 500 MG capsule Take 1 capsule (500 mg total) by mouth 3 (three) times a day for 7 days. 21 capsule 0     No current facility-administered medications for this visit.        Social History     Tobacco Use   Smoking Status Former Smoker   Smokeless Tobacco Never Used   Tobacco Comment    40 + yr ago quit           OBJECTIVE:        No results found for this or any previous visit (from the past 240 hour(s)).    Vitals:    09/16/20 1529   BP: 128/82   Pulse: (!) 59   SpO2: 97%   Weight: 140 lb (63.5 kg)   Height: 5' 10\" (1.778 m)     Weight: 140 lb (63.5 kg)          Physical Exam:  GENERAL APPEARANCE: Pleasant 89-year-old male, NAD, well hydrated, well " nourished  SKIN:  Normal skin turgor, mid back region there is a 3 cm red raised cyst which is likely a sebaceous cyst that now is infected.  Centrally there is a skin pore but no drainage or fluctuance.  It is mildly tender to palpation.  NEURO: no focal findings

## 2021-06-12 NOTE — TELEPHONE ENCOUNTER
Refill Approved    Rx renewed per Medication Renewal Policy. Medication was last renewed on 5/20/20, last OV 9/23/20.    Kamla Fernando, [facility] Triage/Med Refill 11/7/2020     Requested Prescriptions   Pending Prescriptions Disp Refills     KLOR-CON M20 20 mEq tablet [Pharmacy Med Name: KLOR-CON M20 TABLET] 90 tablet 1     Sig: TAKE 1 TABLET BY MOUTH EVERY DAY       Potassium Supplements Refill Protocol Passed - 11/7/2020 12:20 AM        Passed - PCP or prescribing provider visit in past 12 months       Last office visit with prescriber/PCP: 1/13/2020 Parrish Silva MD OR same dept: 9/23/2020 Macario Calderon MD OR same specialty: 9/23/2020 Macario Calderon MD  Last physical: Visit date not found Last MTM visit: Visit date not found   Next visit within 3 mo: Visit date not found  Next physical within 3 mo: Visit date not found  Prescriber OR PCP: Parrish Silva MD  Last diagnosis associated with med order: 1. Benign Essential Hypertension  - KLOR-CON M20 20 mEq tablet [Pharmacy Med Name: KLOR-CON M20 TABLET]; TAKE 1 TABLET BY MOUTH EVERY DAY  Dispense: 90 tablet; Refill: 1    If protocol passes may refill for 12 months if within 3 months of last provider visit (or a total of 15 months).             Passed - Potassium level in last 12 months     Lab Results   Component Value Date    Potassium 4.3 07/28/2020

## 2021-06-12 NOTE — TELEPHONE ENCOUNTER
Refill Approved    Rx renewed per Medication Renewal Policy. Medication was last renewed on 11/11/19.    Brianna Bird, [facility] Triage/Med Refill 10/23/2020     Requested Prescriptions   Pending Prescriptions Disp Refills     ONETOUCH VERIO TEST STRIPS strips [Pharmacy Med Name: ONE TOUCH VERIO TEST STRIP] 100 strip 3     Sig: USE TO TEST BLOOD SUGAR ONCE DAILY BEFORE BREAKFAST.       Diabetic Supplies Refill Protocol Passed - 10/21/2020  3:54 AM        Passed - Visit with PCP or prescribing provider visit in last 6 months     Last office visit with prescriber/PCP: 1/13/2020 Parrish Silva MD OR same dept: 9/23/2020 Macario Calderon MD OR same specialty: 9/23/2020 Macario Calderon MD  Last physical: Visit date not found Last MTM visit: Visit date not found   Next visit within 3 mo: Visit date not found  Next physical within 3 mo: Visit date not found  Prescriber OR PCP: Parrish Silva MD  Last diagnosis associated with med order: 1. Diabetes (H)  - ONETOUCH VERIO TEST STRIPS strips [Pharmacy Med Name: ONE TOUCH VERIO TEST STRIP]; USE TO TEST BLOOD SUGAR ONCE DAILY BEFORE BREAKFAST.  Dispense: 100 strip; Refill: 3    If protocol passes may refill for 12 months if within 3 months of last provider visit (or a total of 15 months).             Passed - A1C in last 6 months     Hemoglobin A1c   Date Value Ref Range Status   07/02/2020 7.3 (H) 3.5 - 6.0 % Final

## 2021-06-13 NOTE — TELEPHONE ENCOUNTER
Refill Approved    Rx renewed per Medication Renewal Policy. Medication was last renewed on 12/16/19, last OV 9/23/20.    Kamla Fernando, [facility] Triage/Med Refill 11/14/2020     Requested Prescriptions   Pending Prescriptions Disp Refills     metoprolol succinate (TOPROL-XL) 25 MG [Pharmacy Med Name: METOPROLOL SUCC ER 25 MG TAB] 90 tablet 2     Sig: TAKE 1 TABLET (25 MG TOTAL) BY MOUTH DAILY.       Beta-Blockers Refill Protocol Passed - 11/14/2020 12:38 AM        Passed - PCP or prescribing provider visit in past 12 months or next 3 months     Last office visit with prescriber/PCP: 1/13/2020 Parrish Silva MD OR same dept: 9/23/2020 Macario Calderon MD OR same specialty: 9/23/2020 Macario Calderon MD  Last physical: Visit date not found Last MTM visit: Visit date not found   Next visit within 3 mo: Visit date not found  Next physical within 3 mo: Visit date not found  Prescriber OR PCP: Parrish Silva MD  Last diagnosis associated with med order: 1. Benign Essential Hypertension  - metoprolol succinate (TOPROL-XL) 25 MG [Pharmacy Med Name: METOPROLOL SUCC ER 25 MG TAB]; TAKE 1 TABLET (25 MG TOTAL) BY MOUTH DAILY.  Dispense: 90 tablet; Refill: 2    If protocol passes may refill for 12 months if within 3 months of last provider visit (or a total of 15 months).             Passed - Blood pressure filed in past 12 months     BP Readings from Last 1 Encounters:   09/23/20 130/80

## 2021-06-13 NOTE — PROGRESS NOTES
Assessment:  1.  Non-insulin-dependent diabetes mellitus, checking status.  2.  Some peripheral neuropathy from the diabetes, controlled.  3.  Hypertension controlled.  4.  Hyperlipidemia, checking status.  5.  Apparently left cataract.  6.  History of depression, but controlled well on current medication.    Plan: Check fasting lipid hepatic and basic profiles and glycosylated hemoglobin.  Continue checking blood sugar once daily.  Recommend stopping atenolol and replacing it with metoprolol succinate 25 mg-1 p.o. daily-#90 refillable ×1, sent to his pharmacy.  Renewed his metformin and glipizide combination which he takes 2 pills twice daily-#360 refillable ×1.  Follow-up in 3 months for next med check but earlier as needed.  If he does end up having cataract surgery scheduled explained he would need to have preop physical done.  Continue with refills on Paxil as needed at this time.  He already had his influenza immunization.    Subjective: 89-year-old male presenting for follow-up on diabetes and had been previously Dr. Shetty patient.  Regarding diabetes he does check blood sugar daily and notes the range has been in the  type range recently.  He lives at home with his wife.  He did have a fall in the morning about 3 weeks ago and had a fracture in his right wrist and is wearing a short arm cast and followed by Preethi MARTINEZ at this time for that.  Regarding hypertension no headaches or leg swelling or ongoing dizziness.  Regarding lipids no diarrhea or constipation or muscle aching.  Regarding history of depression he states that his interest in doing things is okay and his moods are neutral and he denies any trouble with sleep or emotions etc.  Past Medical History:   Diagnosis Date     Depression      Diabetes      Diabetes      Hyperlipidemia      Hypertension      Current Outpatient Prescriptions   Medication Sig Dispense Refill     aspirin 81 MG EC tablet Take 81 mg by mouth bedtime.        "blood-glucose meter (CONTOUR NEXT EZ METER) Misc Use to test blood sugar once per day. 1 each 1     CONTOUR NEXT STRIPS strips USE TO TEST BLOOD SUGAR ONCE PER DAY. 100 strip 6     furosemide (LASIX) 20 MG tablet TAKE 1 TABLET (20 MG TOTAL) BY MOUTH DAILY. 30 tablet 3     gabapentin (NEURONTIN) 600 MG tablet TAKE ONE TABLET BY MOUTH NIGHTLY AT BEDTIME 90 tablet 3     hydrochlorothiazide (HYDRODIURIL) 12.5 MG tablet TAKE ONE TABLET BY MOUTH ONE TIME DAILY 90 tablet 3     latanoprost (XALATAN) 0.005 % ophthalmic solution Administer 1 drop to both eyes bedtime.       lisinopril (PRINIVIL,ZESTRIL) 20 MG tablet TAKE ONE TABLET BY MOUTH ONE TIME DAILY 90 tablet 3     lovastatin (MEVACOR) 20 MG tablet TAKE 1 TABLET BY MOUTH ONE TIME DAILY 90 tablet 2     PARoxetine (PAXIL) 20 MG tablet TAKE ONE TABLET BY MOUTH ONE TIME DAILY 90 tablet 0     potassium chloride SA (K-DUR,KLOR-CON) 20 MEQ tablet TAKE 1 TABLET (20 MEQ TOTAL) BY MOUTH DAILY. 90 tablet 3     sodium chloride (OCEAN) 0.65 % nasal spray 1 spray into each nostril as needed for congestion.       verapamil (VERELAN PM) 240 MG 24 hr capsule TAKE ONE CAPSULE BY MOUTH ONE TIME DAILY 90 capsule 3     glipiZIDE-metFORMIN (METAGLIP) 5-500 mg per tablet TAKE 2 TABLETS BY MOUTH TWICE DAILY 360 tablet 1     metoprolol succinate (TOPROL XL) 25 MG Take 1 tablet (25 mg total) by mouth daily. 90 tablet 1     No current facility-administered medications for this visit.      No Known Allergies  Note that the above listed medication is after the changes I made today.    Objective:/80  Pulse 60  Ht 5' 10\" (1.778 m)  Wt 148 lb (67.1 kg)  BMI 21.24 kg/m2  HEENT shows no acute change.  Neck supple without adenopathy or thyromegaly.  Lungs clear.  Heart regular rate and rhythm without murmur.  Abdomen shows no masses tenderness or hepatosplenomegaly.  No pedal edema.  He does ambulate independently.  He does have some kyphosis, his speech is clear.  Mood is neutral and affect is " normal.

## 2021-06-13 NOTE — TELEPHONE ENCOUNTER
[employer] message sent to daughter through pt's [employer] for more information.     Left message to call back for: [facility] assisted living staff  Information to relay to patient:  TCU orders should work for admission. These forms that Dr Calderon needs to sign will be completed once we have a visit set up for him. Virtual is okay as well.

## 2021-06-13 NOTE — TELEPHONE ENCOUNTER
No answer, will try again later     Patient is still at the TCU will try [facility] again. [phone]

## 2021-06-13 NOTE — TELEPHONE ENCOUNTER
The furosemide was discontinued in the hospital upon discharge 12/1/2020. Please check with the long term care facility to see if he is still taking it?    Dr. Calderon

## 2021-06-13 NOTE — TELEPHONE ENCOUNTER
Who is calling:  Tashia from the facility called back  Reason for Call:  She said that you can disregard those the forms.  Date of last appointment with primary care: NA  Okay to leave a detailed message: No

## 2021-06-13 NOTE — TELEPHONE ENCOUNTER
PC to daughter and informed of VV. States that patient resides at [facility] in . Daughter will call them to discuss/inform. Given clinic call number to arrange [phone]. Maureen at time of call didn't have any phone number to share. [facility] in  at [phone]. ABDULLAHI,Rn

## 2021-06-13 NOTE — TELEPHONE ENCOUNTER
Received a fax from [facility].    Fax is in Dr. Calderon's inbox.    12/11/2020

## 2021-06-13 NOTE — TELEPHONE ENCOUNTER
I have given fax information to Anna to contact [facility] to clarify what is needed from me. Patient recently was in hospital and TCU so those records and orders should be sufficient for admission. I would be happy to do a VV with the patient, and daughter if possible or if not daughter care center  Staff.    Dr. Calderon

## 2021-06-13 NOTE — TELEPHONE ENCOUNTER
----- Message from Tootie Khanna sent at 12/9/2020 12:07 PM CST -----  Regarding: GERTRUDEB/GEENA PT - FOLLOW UP APPT PER [facility] 12/1/20 PROGRESS NOTES  General phone call:    Caller: Pt's Maureen brunson     Primary cardiologist: TEDDY     Detailed reason for call: Maureen states that they were told by [facility] to schedule a 2-4 week follow up appt from [facility]'s 12/1/20 hospital progress notes. She would like to know what the follow up appt will entail as she will need to take pt out of his living facility and bring him. She states that if [facility] would like lab work or other testing done at the appt, then she will bring pt, but if it's just to speak to pt with no lab or testing work done, then she would prefer a virtual visit. Please advise and call back.     Best phone number: [phone], pt's nannette Reddy     Best time to contact: Anytime     Ok to leave a detailed message? Anytime     Device? No     Additional Info:

## 2021-06-13 NOTE — TELEPHONE ENCOUNTER
RN cannot approve Refill Request    RN can NOT refill this medication medication not on med list. Last office visit: 1/13/2020 Parrish Silva MD Last Physical: Visit date not found Last MTM visit: Visit date not found Last visit same specialty: 9/23/2020 Macario Calderon MD.  Next visit within 3 mo: Visit date not found  Next physical within 3 mo: Visit date not found      Kamla Fernando, [facility] Triage/Med Refill 12/19/2020    Requested Prescriptions   Pending Prescriptions Disp Refills     furosemide (LASIX) 20 MG tablet [Pharmacy Med Name: FUROSEMIDE 20 MG TABLET] 90 tablet 3     Sig: TAKE 1 TABLET BY MOUTH EVERY DAY       Diuretics/Combination Diuretics Refill Protocol  Passed - 12/19/2020  1:17 AM        Passed - Visit with PCP or prescribing provider visit in past 12 months     Last office visit with prescriber/PCP: 1/13/2020 Parrish Silva MD OR same dept: 9/23/2020 Macario Calderon MD OR same specialty: 9/23/2020 Macario Calderon MD  Last physical: Visit date not found Last MTM visit: Visit date not found   Next visit within 3 mo: Visit date not found  Next physical within 3 mo: Visit date not found  Prescriber OR PCP: Parrish Silva MD  Last diagnosis associated with med order: 1. HTN (hypertension)  - furosemide (LASIX) 20 MG tablet [Pharmacy Med Name: FUROSEMIDE 20 MG TABLET]; TAKE 1 TABLET BY MOUTH EVERY DAY  Dispense: 90 tablet; Refill: 3    If protocol passes may refill for 12 months if within 3 months of last provider visit (or a total of 15 months).             Passed - Serum Potassium in past 12 months      Lab Results   Component Value Date    Potassium 4.7 12/09/2020             Passed - Serum Sodium in past 12 months      Lab Results   Component Value Date    Sodium 134 (L) 12/09/2020             Passed - Blood pressure on file in past 12 months     BP Readings from Last 1 Encounters:   12/01/20 135/80             Passed - Serum Creatinine in past 12 months      Creatinine    Date Value Ref Range Status   12/09/2020 1.52 (H) 0.70 - 1.30 mg/dL Final

## 2021-06-13 NOTE — TELEPHONE ENCOUNTER
===View-only below this line===  ----- Message -----  From: Amado Holland DO  Sent: 12/10/2020   9:31 AM CST  To: Trevor Zaman RN    Virtual visit is fine.

## 2021-06-14 NOTE — TELEPHONE ENCOUNTER
Received a fax from [facility].    Fax is in Dr. Calderon's inbox.    1/21/21

## 2021-06-14 NOTE — TELEPHONE ENCOUNTER
Dr. Calderon signed.     Faxed and placed in to be scanned.     Copy was made? n/a.    Mae Todd, CMA

## 2021-06-14 NOTE — TELEPHONE ENCOUNTER
Patient is not taking Furosemide. The only diurectic patient is taking is the Hydrochlorithiazide

## 2021-06-14 NOTE — PROGRESS NOTES
Review of systems done over the phone and all within normal limits. He was unable to get his vitals today.

## 2021-06-15 NOTE — TELEPHONE ENCOUNTER
Spoke with daughter Maureen. Pt discharged from [facility] on 3/1/21 after an NSTEMI. Decided to go medical management without procedure. Discharge plan was for follow-up with Cardiology in 1 week. Daughter would prefer to keep her dads' cardiology team, and not go to another Cardiologist through [facility].   Maureen reports that the patient is at home, and having 2 nurse visits per week in the home for monitoring. Will discuss with MAT  his availability the week of 3/15, if unable to facilitate with MAT, will have an appt arranged with CNP in the meantime.   Daughter Maureen, agreeable to the proposed plan. Discussion with MAT , and appt made at  for 3/19 for hospital discharge follow-up. ABDULLAHI,RN

## 2021-06-15 NOTE — PROGRESS NOTES
Assessment:  1.  Non-insulin-dependent diabetes mellitus, checking status.  2.  Hypertension controlled.  3.  Hyperlipidemia, checking status.  4.  Aortic stenosis.  5.  Visual difficulties in left eye followed by ophthalmology.    Plan: Check fasting A1c, lipid hepatic and basic profiles.  Renewed his lovastatin.  Continue current medication.  Follow-up in 4 months for next med check or earlier as needed.  Continue current depression management protocol.  He is stable on current medication.    Subjective: 89-year-old male presenting for follow-up on multiple issues as above.  Regarding diabetes he does check blood sugars daily, he notes this morning was 98, he notes the usual numbers in the 120s-130s.  He mostly checks in the morning but only occasionally in the evening.  Regarding hypertension no headaches or dizziness or leg swelling.  Regarding lipids no diarrhea constipation muscle aching or muscle weakness.  He notes that he still has the trouble with the vision in the left eye and has had several injections in it.  His right eye vision is just fine.  He follows with ophthalmology.  Past Medical History:   Diagnosis Date     Depression      Diabetes      Diabetes      Hyperlipidemia      Hypertension      No Known Allergies  Current Outpatient Prescriptions   Medication Sig Dispense Refill     aspirin 81 MG EC tablet Take 81 mg by mouth bedtime.       blood glucose test (GLUCOSE BLOOD) strips Use 1 each As Directed daily before breakfast. Dispense brand per patient's insurance at pharmacy discretion. 200 strip 1     furosemide (LASIX) 20 MG tablet TAKE 1 TABLET (20 MG TOTAL) BY MOUTH DAILY. 30 tablet 3     gabapentin (NEURONTIN) 600 MG tablet TAKE ONE TABLET BY MOUTH NIGHTLY AT BEDTIME 90 tablet 3     glipiZIDE-metFORMIN (METAGLIP) 5-500 mg per tablet TAKE 2 TABLETS BY MOUTH TWICE DAILY 360 tablet 1     hydrochlorothiazide (HYDRODIURIL) 12.5 MG tablet TAKE ONE TABLET BY MOUTH ONE TIME DAILY 90 tablet 3      "latanoprost (XALATAN) 0.005 % ophthalmic solution Administer 1 drop to both eyes bedtime.       lisinopril (PRINIVIL,ZESTRIL) 20 MG tablet TAKE ONE TABLET BY MOUTH ONE TIME DAILY 90 tablet 3     lovastatin (MEVACOR) 20 MG tablet Take 1 tablet (20 mg total) by mouth every evening. 90 tablet 0     metoprolol succinate (TOPROL XL) 25 MG Take 1 tablet (25 mg total) by mouth daily. 90 tablet 1     PARoxetine (PAXIL) 20 MG tablet TAKE ONE TABLET BY MOUTH ONE TIME DAILY 90 tablet 0     potassium chloride SA (K-DUR,KLOR-CON) 20 MEQ tablet TAKE 1 TABLET (20 MEQ TOTAL) BY MOUTH DAILY. 90 tablet 3     sodium chloride (OCEAN) 0.65 % nasal spray 1 spray into each nostril as needed for congestion.       verapamil (VERELAN PM) 240 MG 24 hr capsule TAKE ONE CAPSULE BY MOUTH ONE TIME DAILY 90 capsule 2     blood glucose meter (GLUCOMETER) Use 1 each As Directed as needed. Dispense glucometer brand per patient's insurance. .May use One Touch Verio Flex Meter. 1 each 0     No current facility-administered medications for this visit.      All other review of systems negative.  His PHQ-9 score today is 8.    Objective:/80  Pulse 60  Ht 5' 9\" (1.753 m)  Wt 147 lb (66.7 kg)  BMI 21.71 kg/m2    Alert male.  HEENT shows no acute change.  Neck supple without adenopathy or thyromegaly.  Lungs clear.  Heart regular rate and rhythm with grade 2/6 crescendo decrescendo murmur heard best at the right upper sternal border consistent with his known mild to moderate aortic stenosis.  Abdomen shows no masses tenderness or hepatosplenomegaly.  No pedal edema.  He does have some thoracic kyphosis.  He ambulates independently.  "

## 2021-06-15 NOTE — TELEPHONE ENCOUNTER
----- Message from GIDEON Rene sent at 3/3/2021 10:06 AM CST -----  Regarding: GEENA PATIENT  General phone call:    Caller: PATIENTS DAUGHTERPATY    Primary cardiologist: GEENA    Detailed reason for call: PATIENTS DAUGHTER WOULD LIKE TO SPEAK TO YOU RE: HER FATHER, HE HAD A HEART ATTACK LAST WEEK AND HOSPITALIZED AT [place] AND NEEDS A Follow-up W/ HIS CARDIOLOGIST, GEENA HAS NO SOON OPENINGS.    Best phone number: [phone]    Best time to contact: ANY    Ok to leave a detailed message? YES    Device? NO    Additional Info:

## 2021-06-16 NOTE — TELEPHONE ENCOUNTER
I renewed the patient's Lexapro for 3 months however the patient needs an annual wellness visit in the next 1 to 2 months.  Please help him make that appointment and there may be a daughter that is involved in assisting him with his medical care?  Thank you,  Dr. Calderon

## 2021-06-16 NOTE — TELEPHONE ENCOUNTER
RN cannot approve Refill Request    RN can NOT refill this medication No established PCP. Last office visit: 1/13/2020 Parrish Silva MD Last Physical: Visit date not found Last MTM visit: Visit date not found Last visit same specialty: 9/23/2020 Macario Calderon MD.  Next visit within 3 mo: Visit date not found  Next physical within 3 mo: Visit date not found      Kamla Fernando, [facility] Triage/Med Refill 4/8/2021    Requested Prescriptions   Pending Prescriptions Disp Refills     escitalopram oxalate (LEXAPRO) 5 MG tablet [Pharmacy Med Name: ESCITALOPRAM 5 MG TABLET] 90 tablet 3     Sig: TAKE 1 TABLET BY MOUTH EVERY DAY       [facility] Refill Protocol  Passed - 4/8/2021 12:31 AM        Passed - PCP or prescribing provider visit in last year     Last office visit with prescriber/PCP: 1/13/2020 Parrish Silva MD OR same dept: 9/23/2020 Macario Calderon MD OR same specialty: 9/23/2020 Macario Calderon MD  Last physical: Visit date not found Last MTM visit: Visit date not found   Next visit within 3 mo: Visit date not found  Next physical within 3 mo: Visit date not found  Prescriber OR PCP: Parrish Silva MD  Last diagnosis associated with med order: 1. Mild episode of recurrent major depressive disorder (H)  - escitalopram oxalate (LEXAPRO) 5 MG tablet [Pharmacy Med Name: ESCITALOPRAM 5 MG TABLET]; TAKE 1 TABLET BY MOUTH EVERY DAY  Dispense: 90 tablet; Refill: 3    If protocol passes may refill for 12 months if within 3 months of last provider visit (or a total of 15 months).

## 2021-06-17 NOTE — PROGRESS NOTES
[facility] Care note      Patient: Wicho Zhao  MRN: [id number]      [facility] [[id number]]  Reason for Visit     Chief Complaint   Patient presents with     H & P       Code Status     DNR    Assessment     Left hip intertrochanteric fracture status post ORIF on 5/2/2021  Non-ST elevation MI cardial infarction  Acute diastolic congestive heart failure status post diuresis  Postoperative encephalopathy  Anemia requiring 2 units of packed RBC transfusion  Acute urinary retention  Moderate malnutrition  Cognitive impairment  Acute fall in the TCU  Generalized weakness    Plan     Patient admitted to the TCU for strengthening and rehab.  Continue with his incisional cares.  Weightbearing as tolerated.  For DVT prophylaxis he has been discharged on Lovenox subcu 30 mg daily  Due to blood loss anemia he did get 2 units of blood transfusion in the hospital.  Recheck hemoglobin in the TCU.  Had postoperative urinary retention however catheter was removed prior to discharge.  He is on Flomax.  Urine output to be monitored and they have recommended replacing Trujillo as needed.  He also has moderate malnutrition with poor oral intake he is encouraged to take protein supplementation daily  Started on Remeron 7.5 mg daily  Had severe postoperative delirium requiring ICU transfer.  Discharge on Seroquel.  Unfortunately extremely confused and disoriented.  Not sure what his baseline cognitive status is but currently alert and oriented only to himself.  He had an acute fall in the TCU.  No injuries have been reported.  Nursing was required to do frequent checks on him and put him on a voiding protocol.  Pain does not seem to be the issue which caused him to fall as he denies any pain at rest.  He also developed acute diastolic CHF and has been discharged status post diuresis.  Weights to be monitored.  In addition he had recently diagnosis of NSTEMI and remains asymptomatic on medical management.  He  is restarted on Plavix in 6 weeks.  CONT PT    History     Patient is a very pleasant 89 y.o. male who is admitted to TCU  Patient admitted to the hospital after a mechanical fall.  He was diagnosed with a left hip intertrochanteric fracture.  Orthopedics was consulted.  He underwent surgical repair on 5/2/2021.  Postoperatively he has been discharged to the TCU  Unfortunately he had a very  lengthy postoperative course with multiple complications.  He developed acute blood loss anemia requiring 2 units of blood transfusion.  Discharge hemoglobin did improve to 8.4.  He developed postoperative urinary retention.  He is on Flomax.  They had a Trujillo catheter in place which was removed prior to discharge.  In addition he developed postoperative encephalopathy.  He was on BiPAP and required ICU support.  While in the hospital he had delirium with recurrent agitation and was given Seroquel.  Unfortunately is quite agitated and repeatedly in the middle of the exam requesting that he be discharged home.  He is not alert and oriented to his surroundings.  He also had an acute fall in the TCU.  No injuries he is attempting self transfer      Past Medical History     Active Ambulatory (Non-Hospital) Problems    Diagnosis     Cognitive and behavioral changes     Postoperative delirium     Sleep difficulties     Metabolic encephalopathy     Acute respiratory failure with hypoxia (H)     Coronary artery disease involving native coronary artery of native heart without angina pectoris     Hypertension, unspecified type     Mitral valve stenosis, unspecified etiology     Dyslipidemia     Abnormal electrocardiogram     Status post aortic valve replacement     Closed intertrochanteric fracture of left hip, initial encounter (H)     Hip fracture requiring operative repair, left, closed, initial encounter (H)     Closed displaced intertrochanteric fracture of left femur, initial encounter (H)     Chest pain     Prolonged Q-T interval  on ECG     Nonrheumatic aortic valve stenosis     Non-STEMI (non-ST elevated myocardial infarction) (H)     DNAR (do not attempt resuscitation)     Acute non-ST elevation myocardial infarction (NSTEMI) (H)     Hypomagnesemia     Stage 3 chronic kidney disease     Neovascular glaucoma of left eye, severe stage     Primary open angle glaucoma of right eye, moderate stage     Mild aortic stenosis     Cervical kyphosis     Diabetic peripheral neuropathy associated with type 2 diabetes mellitus (H)     Glaucoma     Chronic anemia     Hyperlipidemia     Diabetes mellitus (H)     Essential hypertension     Past Medical History:   Diagnosis Date     Basal Cell Carcinoma Of The Skin Of The Trunk      Cataract 02/03/2016     Central retinal vein occlusion of left eye 02/03/2016     Chronic anemia 12/14/2014     Diabetes (H)      DNAR (do not attempt resuscitation)      Essential hypertension      Hyperlipidemia      Hypertension      Major depression in complete remission (H) 08/21/2019     Mild aortic stenosis 03/29/2017     Nuclear senile cataract of left eye 03/08/2018     Retinal hemorrhage, left 02/03/2016     Stage 3 chronic kidney disease 08/21/2019       Past Social History     Reviewed, and he  reports that he quit smoking about 42 years ago. His smoking use included cigarettes. He has a 10.00 pack-year smoking history. He has never used smokeless tobacco. He reports current alcohol use. He reports that he does not use drugs.    Family History     Reviewed, and family history includes Diabetes in his father; Hypertension in his mother; No Medical Problems in his daughter, daughter, and daughter.    Medication List   Post Discharge Medication Reconciliation Status: discharge medications reconciled, continue medications without change   Current Outpatient Medications on File Prior to Visit   Medication Sig Dispense Refill     acetaminophen (TYLENOL) 325 MG tablet Take 3 tablets (975 mg total) by mouth every 8 (eight)  hours.  0     amLODIPine (NORVASC) 5 MG tablet Take 1 tablet (5 mg total) by mouth daily.  0     aspirin 81 MG EC tablet Take 1 tablet (81 mg total) by mouth daily.  0     atorvastatin (LIPITOR) 40 MG tablet Take 40 mg by mouth at bedtime.        [START ON 6/17/2021] clopidogreL (PLAVIX) 75 mg tablet Take 1 tablet (75 mg total) by mouth daily. AFTER 6 WEEKS OF LOVENOX 1 tablet 0     enoxaparin ANTICOAGULANT (LOVENOX) 30 mg/0.3 mL syringe Inject 0.3 mL (30 mg total) under the skin daily. 35 Syringe 0     escitalopram oxalate (LEXAPRO) 5 MG tablet TAKE 1 TABLET BY MOUTH EVERY DAY 90 tablet 0     ferrous sulfate 325 (65 FE) MG tablet Take 1 tablet (325 mg total) by mouth every other day.  0     furosemide (LASIX) 40 MG tablet Take 1 tablet (40 mg total) by mouth daily.  0     gabapentin (NEURONTIN) 100 MG capsule Take 600 mg by mouth at bedtime.  0     glipiZIDE (GLUCOTROL) 5 MG tablet Take 5 mg by mouth daily.       isosorbide mononitrate (IMDUR) 30 MG 24 hr tablet Take 3 tablets (90 mg total) by mouth daily. 90 tablet 0     latanoprost (XALATAN) 0.005 % ophthalmic solution Administer 1 drop to both eyes bedtime.       magnesium oxide 250 mg magnesium Tab Take 1 tablet (250 mg total) by mouth 2 (two) times a day.  0     melatonin 3 mg Tab tablet Take 1 tablet (3 mg total) by mouth at bedtime.  0     metFORMIN (GLUCOPHAGE) 500 MG tablet TAKE 2 TABLETS BY MOUTH TWICE A DAY WITH FOOD 360 tablet 1     metoprolol succinate (TOPROL-XL) 50 MG 24 hr tablet Take 50 mg by mouth daily.       mirtazapine (REMERON) 7.5 MG tablet Take 1 tablet (7.5 mg total) by mouth at bedtime.  0     nitroglycerin (NITROSTAT) 0.4 MG SL tablet Take 0.4 mg by mouth every 5 (five) minutes as needed for chest pain.       polyethylene glycol (MIRALAX) 17 gram packet Take 1 packet (17 g total) by mouth daily as needed.  0     potassium chloride (KLOR-CON) 20 mEq packet Take 20 mEq by mouth daily. Crush and give with apple sauce  0     QUEtiapine  (SEROQUEL) 25 MG tablet Take 0.5 tablets (12.5 mg total) by mouth 2 (two) times a day as needed (agitation).  0     QUEtiapine (SEROQUEL) 25 MG tablet Take 0.5 tablets (12.5 mg total) by mouth at bedtime for 7 days.  0     senna-docusate (PERICOLACE) 8.6-50 mg tablet Take 1 tablet by mouth 2 (two) times a day. 60 tablet 0     sodium chloride (OCEAN) 0.65 % nasal spray 1 spray into each nostril as needed for congestion.       tamsulosin (FLOMAX) 0.4 mg cap Take 1 capsule (0.4 mg total) by mouth daily.  0     Current Facility-Administered Medications on File Prior to Visit   Medication Dose Route Frequency Provider Last Rate Last Admin     [DISCONTINUED] acetaminophen tablet 650 mg (TYLENOL)  650 mg Oral Q4H PRN Renay Jones MD   650 mg at 05/06/21 0939     [DISCONTINUED] amLODIPine tablet 5 mg (NORVASC)  5 mg Oral DAILY Renay Jones MD   5 mg at 05/06/21 0943     [DISCONTINUED] atorvastatin tablet 40 mg (LIPITOR)  40 mg Oral QHS Renay Jones MD   40 mg at 05/05/21 2013     [DISCONTINUED] benzocaine-menthoL lozenge 1 lozenge (CEPACOL)  1 lozenge Oral Q1H PRN Renay Jones MD         [DISCONTINUED] bisacodyL suppository 10 mg (DULCOLAX)  10 mg Rectal Daily PRN Renay Jones MD         [DISCONTINUED] dextrose 50 % (D50W) syringe 20-50 mL  20-50 mL Intravenous Q15 Min PRN Renay Jones MD         [DISCONTINUED] docusate sodium capsule 100 mg (COLACE)  100 mg Oral BID Renay Jones MD   100 mg at 05/06/21 0943     [DISCONTINUED] enoxaparin ANTICOAGULANT syringe 30 mg (LOVENOX)  30 mg Subcutaneous Q24H Renay Jones MD   30 mg at 05/06/21 0524     [DISCONTINUED] escitalopram oxalate tablet 5 mg (LEXAPRO)  5 mg Oral DAILY Renay Jones MD   5 mg at 05/06/21 0943     [DISCONTINUED] furosemide tablet 40 mg (LASIX)  40 mg Oral DAILY Jono Powell (Carlito), MD   40 mg at 05/06/21 0939     [DISCONTINUED] gabapentin (NEURONTIN) capsule 700 mg  700 mg Oral QHS Renay Jones MD   700 mg at 05/05/21  2013     [DISCONTINUED] glucagon (human recombinant) injection 1 mg  1 mg Subcutaneous Q15 Min PRN Renay Jones MD         [DISCONTINUED] HYDROmorphone injection 0.2 mg (DILAUDID)  0.2 mg Intravenous Q4H PRN Renay Jones MD   0.2 mg at 05/03/21 1239     [DISCONTINUED] insulin aspart U-100 injection pen (NovoLOG)   Subcutaneous TID with meals Renay Jones MD   2 Units at 05/06/21 1316     [DISCONTINUED] ipratropium-albuteroL 0.5-2.5 mg/3 mL nebulizer solution 3 mL (DUO-NEB)  3 mL Nebulization QID PRN Renay Jones MD         [DISCONTINUED] isosorbide mononitrate 24 hr tablet 30 mg (IMDUR)  30 mg Oral DAILY Renay Jones MD   30 mg at 05/06/21 0943     [DISCONTINUED] latanoprost 0.005 % ophthalmic solution 1 drop (XALATAN)  1 drop Both Eyes QHS Renay Jones MD   1 drop at 05/05/21 2019     [DISCONTINUED] lidocaine 10 mg/mL (1 %) injection 0.1-1 mL  0.1-1 mL Other Q1H PRN Renay Jones MD         [DISCONTINUED] lidocaine 4 % cream (LMX)   Topical Q1H PRN Renay Jones MD         [DISCONTINUED] LORazepam 1 mg injection (diluted concentration)  1 mg Intravenous Once Renay Jones MD         [DISCONTINUED] melatonin tablet 3 mg  3 mg Oral QHS Renay Jones MD   3 mg at 05/05/21 2013     [DISCONTINUED] metoprolol succinate 24 hr tablet 50 mg (TOPROL-XL)  50 mg Oral DAILY Renay Jones MD   50 mg at 05/06/21 0943     [DISCONTINUED] naloxone injection 0.2 mg (NARCAN)  0.2 mg Intravenous Q2 Min PRN Renay Jones MD         [DISCONTINUED] naloxone injection 0.2 mg (NARCAN)  0.2 mg Intramuscular Q2 Min PRN Renay Jones MD         [DISCONTINUED] naloxone injection 0.4 mg (NARCAN)  0.4 mg Intravenous Q2 Min PRN Renay Jones MD         [DISCONTINUED] naloxone injection 0.4 mg (NARCAN)  0.4 mg Intramuscular Q2 Min PRN Renay Jones MD         [DISCONTINUED] nitroglycerin SL tablet 0.4 mg (NITROSTAT)  0.4 mg Sublingual Q5 Min PRN Renay Jones MD         [DISCONTINUED] ondansetron disintegrating  tablet 4 mg (ZOFRAN-ODT)  4 mg Oral Q6H PRN Renay Jones MD         [DISCONTINUED] ondansetron injection 4 mg (ZOFRAN)  4 mg Intravenous Q6H PRN Renay Jones MD         [DISCONTINUED] oxyCODONE immediate release tablet 5 mg (ROXICODONE)  5 mg Oral Q6H PRN Renay Jones MD   5 mg at 05/06/21 1552     [DISCONTINUED] polyethylene glycol packet 17 g (MIRALAX)  17 g Oral DAILY Renay Jones MD   17 g at 05/06/21 0943     [DISCONTINUED] prochlorperazine injection 5 mg (COMPAZINE)  5 mg Intravenous Q6H PRN Renay Jones MD         [DISCONTINUED] prochlorperazine tablet 5 mg (COMPAZINE)  5 mg Oral Q6H PRN Renay Jones MD         [DISCONTINUED] QUEtiapine tablet 12.5 mg (SEROquel)  12.5 mg Oral TID PRN Renay Jones MD   12.5 mg at 05/04/21 1723     [DISCONTINUED] QUEtiapine tablet 12.5 mg (SEROquel)  12.5 mg Oral QHS Renay Jones MD   12.5 mg at 05/05/21 2013     [DISCONTINUED] senna-docusate 8.6-50 mg tablet 1 tablet (PERICOLACE)  1 tablet Oral BID Renay Jones MD   1 tablet at 05/06/21 0943     [DISCONTINUED] sodium chloride bacteriostatic 0.9 % injection 0.1-0.3 mL  0.1-0.3 mL Subcutaneous PRN Renay Jones MD         [DISCONTINUED] sodium chloride flush 3 mL (NS)  3 mL Intravenous Line Care Renay Jones MD   3 mL at 05/05/21 1700     [DISCONTINUED] sodium chloride flush 3 mL (NS)  3 mL Intracatheter Q1 Min PRN Renay Jones MD   3 mL at 05/03/21 0942     [DISCONTINUED] sodium chloride flush 3 mL (NS)  3 mL Intracatheter Q8H Renay Jones MD   10 mL at 05/05/21 0853     [DISCONTINUED] tamsulosin 24 hr capsule 0.4 mg (FLOMAX)  0.4 mg Oral DAILY Renay Jones MD   0.4 mg at 05/06/21 0939       Allergies     No Known Allergies    Review of Systems   A comprehensive review of 14 systems was done. Pertinent findings noted here and in history of present illness. All the rest negative.  Constitutional: Negative.  Negative for fever, chills, activity change, appetite change and fatigue.   HENT:  Negative for congestion and facial swelling.    Eyes: Negative for photophobia, redness and visual disturbance.   Respiratory: Negative for cough and chest tightness.    Cardiovascular: Negative for chest pain, palpitations and leg swelling.   Gastrointestinal: Negative for nausea, diarrhea, constipation, blood in stool and abdominal distention.   Genitourinary: Negative.    Musculoskeletal: Negative.  Had an acute fall and this is suspected to be self transferring  Skin: Negative.    Neurological: Negative for dizziness, tremors, syncope, weakness, light-headedness and headaches.   Hematological: Does not bruise/bleed easily.   Psychiatric/Behavioral: Agitated repeatedly saying he wants to go home but not sure where his home is      Physical Exam     Recent Vitals 5/6/2021   Height -   Weight -   BSA (m2) -   /71   Pulse 95   Temp 97.4   Temp src 1   SpO2 91   Some recent data might be hidden       Constitutional: Oriented to person,  cachectic and appears well-developed.   HEENT:  Normocephalic and atraumatic.  Eyes: Conjunctivae and EOM are normal. Pupils are equal, round, and reactive to light. No discharge.  No scleral icterus. Nose normal. Mouth/Throat: Oropharynx is clear and moist. No oropharyngeal exudate.    NECK: Normal range of motion. Neck supple. No JVD present. No tracheal deviation present. No thyromegaly present.   CARDIOVASCULAR: Normal rate, regular rhythm and intact distal pulses.  Exam reveals no gallop and no friction rub.  Systolic murmur present.  PULMONARY: Effort normal and breath sounds normal. No respiratory distress.No Wheezing or rales.  ABDOMEN: Soft. Bowel sounds are normal. No distension and no mass.  There is no tenderness. There is no rebound and no guarding. No HSM.  MUSCULOSKELETAL: Normal range of motion. No edema and no tenderness. Mild kyphosis, no tenderness.  L hip incision is intact  LYMPH NODES: Has no cervical, supraclavicular, axillary and groin adenopathy.    NEUROLOGICAL: Alert and oriented to persON. No cranial nerve deficit.  Normal muscle tone. Coordination normal.   GENITOURINARY: Deferred exam.  SKIN: Skin is warm and dry. No rash noted. No erythema. No pallor.   EXTREMITIES: No cyanosis, no clubbing, no edema. No Deformity.  PSYCHIATRIC: Normal mood, affect and behavior.  Very agitated repeatedly requesting that he be discharged home.  Not alert and oriented with no recall      Lab Results     Recent Results (from the past 240 hour(s))   ECG 12 lead nursing unit performed    Collection Time: 05/01/21  2:07 PM   Result Value Ref Range    SYSTOLIC BLOOD PRESSURE 126 mmHg    DIASTOLIC BLOOD PRESSURE 61 mmHg    VENTRICULAR RATE 79 BPM    ATRIAL RATE 79 BPM    P-R INTERVAL 160 ms    QRS DURATION 104 ms    Q-T INTERVAL 440 ms    QTC CALCULATION (BEZET) 504 ms    P Axis 113 degrees    R AXIS 60 degrees    T AXIS 76 degrees    MUSE DIAGNOSIS       Sinus rhythm with occasional Premature ventricular complexes  Cannot rule out Inferior infarct (cited on or before 27-APR-2021)  Prolonged QT  Abnormal ECG  When compared with ECG of 27-APR-2021 07:32,  Premature ventricular complexes are now Present  Confirmed by SEE ED PROVIDER NOTE FOR, ECG INTERPRETATION ([id number]),  GEOFFREY ABDALLA ([id number]) on 5/1/2021 4:54:25 PM     Basic Metabolic Panel    Collection Time: 05/01/21  2:19 PM   Result Value Ref Range    Sodium 140 136 - 145 mmol/L    Potassium 4.7 3.5 - 5.0 mmol/L    Chloride 108 (H) 98 - 107 mmol/L    CO2 20 (L) 22 - 31 mmol/L    Anion Gap, Calculation 12 5 - 18 mmol/L    Glucose 128 (H) 70 - 125 mg/dL    Calcium 8.7 8.5 - 10.5 mg/dL    BUN 29 (H) 8 - 28 mg/dL    Creatinine 1.71 (H) 0.70 - 1.30 mg/dL    GFR MDRD Af Amer 45 (L) >60 mL/min/1.73m2    GFR MDRD Non Af Amer 37 (L) >60 mL/min/1.73m2   Magnesium    Collection Time: 05/01/21  2:19 PM   Result Value Ref Range    Magnesium 1.5 (L) 1.8 - 2.6 mg/dL   INR    Collection Time: 05/01/21  2:19 PM   Result Value Ref  Range    INR 1.16 (H) 0.90 - 1.10   Troponin I    Collection Time: 05/01/21  2:19 PM   Result Value Ref Range    Troponin I 0.03 0.00 - 0.29 ng/mL   Asymptomatic SARS-CoV-2 (COVID-19)-PCR    Collection Time: 05/01/21  2:19 PM    Specimen: Respiratory   Result Value Ref Range    SARS-CoV-2 PCR Result Negative Negative, Invalid   HM1 (CBC with Diff)    Collection Time: 05/01/21  2:20 PM   Result Value Ref Range    WBC 9.2 4.0 - 11.0 thou/uL    RBC 2.79 (L) 4.40 - 6.20 mill/uL    Hemoglobin 8.6 (L) 14.0 - 18.0 g/dL    Hematocrit 27.1 (L) 40.0 - 54.0 %    MCV 97 80 - 100 fL    MCH 30.8 27.0 - 34.0 pg    MCHC 31.7 (L) 32.0 - 36.0 g/dL    RDW 14.9 (H) 11.0 - 14.5 %    Platelets 229 140 - 440 thou/uL    MPV 9.7 8.5 - 12.5 fL    Neutrophils % 83 (H) 50 - 70 %    Lymphocytes % 10 (L) 20 - 40 %    Monocytes % 7 2 - 10 %    Eosinophils % 1 0 - 6 %    Basophils % 0 0 - 2 %    Immature Granulocyte % 0 <=0 %    Neutrophils Absolute 7.6 2.0 - 7.7 thou/uL    Lymphocytes Absolute 0.9 0.8 - 4.4 thou/uL    Monocytes Absolute 0.6 0.0 - 0.9 thou/uL    Eosinophils Absolute 0.1 0.0 - 0.4 thou/uL    Basophils Absolute 0.0 0.0 - 0.2 thou/uL    Immature Granulocyte Absolute 0.0 <=0.0 thou/uL   Urinalysis-UC if Indicated    Collection Time: 05/01/21  3:57 PM   Result Value Ref Range    Color, UA Light Yellow Light Yellow, Yellow    Clarity, UA Clear Clear    Glucose, UA Negative Negative    Protein, UA 30 mg/dL (!) Negative    Bilirubin, UA Negative Negative    Urobilinogen, UA <2.0 mg/dL <2.0 mg/dL    pH, UA 5.5 5.0 - 8.0    Blood, UA 0.1 mg/dL (!) Negative    Ketones, UA Negative Negative    Nitrite, UA Negative Negative    Leukocytes, UA Negative Negative    Specific Gravity, UA 1.021 1.001 - 1.030    RBC, UA 20 (H) <=2 hpf    WBC UA 3 <=5 hpf    Bacteria, UA Few (!) None Seen    Squamous Epithel, UA 0 <=5 /HPF    Mucus, UA Present (!) None Seen lpf   POCT Glucose    Collection Time: 05/01/21  6:00 PM    Specimen: Blood   Result Value Ref  Range    Glucose 175 (H) 70 - 139 mg/dL   POCT Glucose    Collection Time: 05/01/21  9:30 PM    Specimen: Blood   Result Value Ref Range    Glucose 165 (H) 70 - 139 mg/dL   POCT Glucose    Collection Time: 05/02/21  6:14 AM    Specimen: Blood   Result Value Ref Range    Glucose 137 70 - 139 mg/dL   Basic Metabolic Panel    Collection Time: 05/02/21  6:21 AM   Result Value Ref Range    Sodium 142 136 - 145 mmol/L    Potassium 4.0 3.5 - 5.0 mmol/L    Chloride 106 98 - 107 mmol/L    CO2 25 22 - 31 mmol/L    Anion Gap, Calculation 11 5 - 18 mmol/L    Glucose 152 (H) 70 - 125 mg/dL    Calcium 8.7 8.5 - 10.5 mg/dL    BUN 28 8 - 28 mg/dL    Creatinine 1.48 (H) 0.70 - 1.30 mg/dL    GFR MDRD Af Amer 54 (L) >60 mL/min/1.73m2    GFR MDRD Non Af Amer 44 (L) >60 mL/min/1.73m2   Troponin I    Collection Time: 05/02/21  6:21 AM   Result Value Ref Range    Troponin I 0.02 0.00 - 0.29 ng/mL   BNP(B-type Natriuretic Peptide)    Collection Time: 05/02/21  6:21 AM   Result Value Ref Range    BNP 1,047 (H) 0 - 93 pg/mL   HM1 (CBC with Diff)    Collection Time: 05/02/21  6:21 AM   Result Value Ref Range    WBC 9.9 4.0 - 11.0 thou/uL    RBC 2.81 (L) 4.40 - 6.20 mill/uL    Hemoglobin 8.4 (L) 14.0 - 18.0 g/dL    Hematocrit 27.0 (L) 40.0 - 54.0 %    MCV 96 80 - 100 fL    MCH 29.9 27.0 - 34.0 pg    MCHC 31.1 (L) 32.0 - 36.0 g/dL    RDW 15.0 (H) 11.0 - 14.5 %    Platelets 229 140 - 440 thou/uL    MPV 9.5 8.5 - 12.5 fL    Neutrophils % 75 (H) 50 - 70 %    Lymphocytes % 14 (L) 20 - 40 %    Monocytes % 10 2 - 10 %    Eosinophils % 1 0 - 6 %    Basophils % 0 0 - 2 %    Immature Granulocyte % 0 <=0 %    Neutrophils Absolute 7.4 2.0 - 7.7 thou/uL    Lymphocytes Absolute 1.4 0.8 - 4.4 thou/uL    Monocytes Absolute 1.0 (H) 0.0 - 0.9 thou/uL    Eosinophils Absolute 0.1 0.0 - 0.4 thou/uL    Basophils Absolute 0.0 0.0 - 0.2 thou/uL    Immature Granulocyte Absolute 0.0 <=0.0 thou/uL   Magnesium    Collection Time: 05/02/21  6:21 AM   Result Value Ref  Range    Magnesium 1.5 (L) 1.8 - 2.6 mg/dL   ECG 12 lead MUSE    Collection Time: 05/02/21  6:25 AM   Result Value Ref Range    SYSTOLIC BLOOD PRESSURE      DIASTOLIC BLOOD PRESSURE      VENTRICULAR RATE 80 BPM    ATRIAL RATE 51 BPM    P-R INTERVAL 142 ms    QRS DURATION 108 ms    Q-T INTERVAL 426 ms    QTC CALCULATION (BEZET) 491 ms    P Axis      R AXIS 56 degrees    T AXIS 84 degrees    MUSE DIAGNOSIS       Sinus bradycardia with sinus arrhythmia with frequent Premature ventricular complexes  Marked ST abnormality, possible anterior subendocardial injury  Prolonged QT  Abnormal ECG  When compared with ECG of 01-MAY-2021 14:07,  No significant change was found  Confirmed by GILMAR RIVERA MD LOC:WW ([id number]) on 5/3/2021 2:59:40 PM     Echo Complete    Collection Time: 05/02/21  8:46 AM   Result Value Ref Range    LV volume diastolic 97 62.0 - 150.0 cm3    LV volume systolic 43.4 21.0 - 61.0 cm3    HR 85 bpm    IVSd 0.841 0.6 - 1.0 cm    LVIDd 4.85 4.2 - 5.8 cm    LVIDs 3.35 2.5 - 4.0 cm    LVOT diam 1.6 cm    LVOT mean gradient 1 mmHg    LVOT peak VTI 17.1 cm    LVOT mean kyle 50.8 cm/s    LVOT peak kyle 75.1 cm/s    LVOT peak gradient 2 mmHg    LV PWd 1.09 (!) 0.6 - 1.0 cm    MV E' lat kyle 7.74 cm/s    MV E' med kyle 3.58 cm/s    AV mean kyle 172 cm/s    AV mean gradient 14 mmHg    AV VTI 55.5 cm    AV peak kyle 280 cm/s    AO root 3.4 cm    AO ascending 3.3 cm    LA size 4.3 cm    LA/AO root ratio 1.26 no units    MV decel slope 8,500 mm/s2    MV decel time 194 ms    MV P 1/2 time 57 ms    MV peak A kyle 124 cm/s    MV peak E kyle 165 cm/s    MV mean kyle 120 cm/s    MV mean gradient 7 mmHg    MV VTI 38.4 cm    MV peak Velocity 196 cm/s    PV accel time 134 ms    TR peak kyle 311 cm/s    BSA 1.85 m2    Hieght 73 in    Weight 2,350.4 lbs    /69 mmHg    IVS/PW ratio 0.8     TR peak gradent 38.7 mmHg    LV FS 30.9 28.0 - 44.0 %    Echo LVEF calculated 55 55 - 75 %    LA volume 89.6 mL    LV mass 165.1 g    AV area 0.6  cm2    AV DIM IND kyle 0.3     MV area p 1/2 time 3.9 cm2    MV area cont eq 0.9 cm2    MV E/A Ratio 1.3     LVOT area 2.01 cm2    LVOT SV 34.4 cm3    AV peak gradient 31.4 mmHg    MV peak gradient 15.4 mmHg    LV systolic volume index 23.5 11.0 - 31.0 cm3/m2    LV diastolic volume index 52.4 34.0 - 74.0 cm3/m2    LA volume index 48.4 mL/m2    LV mass index 89.2 g/m2    LV SVi 18.6 ml/m2    TAPSE 2.6 cm    MV med E/e' ratio 46.1     MV lat E/e' ratio 21.3     LV CO 2.9 l/min    LV Ci 1.6 l/min/m2    LA area 2 21.3 cm2    LA area 1 28.2 cm2    Height 73.0 in    Weight 147 lbs    MV Avg E/e' Ratio 29.2 cm/s    LA length 5.7 cm    AV DIM IND VTI 0.3     MVA VTI 0.89 cm2   POCT Glucose    Collection Time: 05/02/21 11:20 AM    Specimen: Blood   Result Value Ref Range    Glucose 172 (H) 70 - 139 mg/dL   POCT Glucose - for patients with diabetes    Collection Time: 05/02/21  4:48 PM    Specimen: Blood   Result Value Ref Range    Glucose 185 (H) 70 - 139 mg/dL   Creatinine    Collection Time: 05/02/21  6:03 PM   Result Value Ref Range    Creatinine 1.57 (H) 0.70 - 1.30 mg/dL    GFR MDRD Af Amer 50 (L) >60 mL/min/1.73m2    GFR MDRD Non Af Amer 41 (L) >60 mL/min/1.73m2   BNP(B-type Natriuretic Peptide)    Collection Time: 05/02/21  6:03 PM   Result Value Ref Range    BNP 1,466 (H) 0 - 93 pg/mL   HM2(CBC w/o Differential)    Collection Time: 05/02/21  6:03 PM   Result Value Ref Range    WBC 9.6 4.0 - 11.0 thou/uL    RBC 2.49 (L) 4.40 - 6.20 mill/uL    Hemoglobin 7.6 (L) 14.0 - 18.0 g/dL    Hematocrit 24.3 (L) 40.0 - 54.0 %    MCV 98 80 - 100 fL    MCH 30.5 27.0 - 34.0 pg    MCHC 31.3 (L) 32.0 - 36.0 g/dL    RDW 15.0 (H) 11.0 - 14.5 %    Platelets 224 140 - 440 thou/uL    MPV 9.9 8.5 - 12.5 fL   Lactic Acid    Collection Time: 05/02/21  6:03 PM   Result Value Ref Range    Lactic Acid 1.6 0.7 - 2.0 mmol/L   Basic Metabolic Panel    Collection Time: 05/02/21  6:03 PM   Result Value Ref Range    Sodium 137 136 - 145 mmol/L     Potassium 4.4 3.5 - 5.0 mmol/L    Chloride 103 98 - 107 mmol/L    CO2 23 22 - 31 mmol/L    Anion Gap, Calculation 11 5 - 18 mmol/L    Glucose 224 (H) 70 - 125 mg/dL    Calcium 8.5 8.5 - 10.5 mg/dL    BUN 29 (H) 8 - 28 mg/dL    Creatinine 1.56 (H) 0.70 - 1.30 mg/dL    GFR MDRD Af Amer 50 (L) >60 mL/min/1.73m2    GFR MDRD Non Af Amer 42 (L) >60 mL/min/1.73m2   Magnesium    Collection Time: 05/02/21  6:03 PM   Result Value Ref Range    Magnesium 2.2 1.8 - 2.6 mg/dL   POCT Glucose - for patients with diabetes    Collection Time: 05/02/21  7:14 PM    Specimen: Blood   Result Value Ref Range    Glucose 213 (H) 70 - 139 mg/dL   Blood Gases, Arterial    Collection Time: 05/02/21  7:35 PM   Result Value Ref Range    pH, Arterial 7.35 (L) 7.37 - 7.44    pCO2, Arterial 43 35 - 45 mm Hg    pO2, Arterial 106 (H) 75 - 85 mm Hg    Bicarbonate, Arterial Calc 22.5 (L) 23.0 - 29.0 mmol/L    O2 Sat, Arterial 98.4 (H) 95.0 - 96.0 %    Oxyhemoglobin 96.4 (H) 95.0 - 96.0 %    Base Excess, Arterial Calc -2.4 mmol/L    Ventilation Mode Bi-PAP     Rate 12 rr/min    FIO2 0.60     Peep 6 cm H2O    Sample Stabilized Temperature 37.0 degrees C   Type and Screen    Collection Time: 05/02/21  7:47 PM   Result Value Ref Range    ABORh A NEG     Antibody Screen Negative Negative   POCT Glucose    Collection Time: 05/02/21 10:01 PM    Specimen: Blood   Result Value Ref Range    Glucose 227 (H) 70 - 139 mg/dL   Magnesium    Collection Time: 05/03/21  5:09 AM   Result Value Ref Range    Magnesium 2.0 1.8 - 2.6 mg/dL   HM2(CBC w/o Differential)    Collection Time: 05/03/21  5:09 AM   Result Value Ref Range    WBC 9.9 4.0 - 11.0 thou/uL    RBC 3.19 (L) 4.40 - 6.20 mill/uL    Hemoglobin 9.5 (L) 14.0 - 18.0 g/dL    Hematocrit 29.1 (L) 40.0 - 54.0 %    MCV 91 80 - 100 fL    MCH 29.8 27.0 - 34.0 pg    MCHC 32.6 32.0 - 36.0 g/dL    RDW 15.8 (H) 11.0 - 14.5 %    Platelets 203 140 - 440 thou/uL    MPV 10.1 8.5 - 12.5 fL   Basic Metabolic Panel    Collection  Time: 05/03/21  5:09 AM   Result Value Ref Range    Sodium 139 136 - 145 mmol/L    Potassium 4.2 3.5 - 5.0 mmol/L    Chloride 103 98 - 107 mmol/L    CO2 24 22 - 31 mmol/L    Anion Gap, Calculation 12 5 - 18 mmol/L    Glucose 237 (H) 70 - 125 mg/dL    Calcium 8.1 (L) 8.5 - 10.5 mg/dL    BUN 33 (H) 8 - 28 mg/dL    Creatinine 1.53 (H) 0.70 - 1.30 mg/dL    GFR MDRD Af Amer 52 (L) >60 mL/min/1.73m2    GFR MDRD Non Af Amer 43 (L) >60 mL/min/1.73m2   POCT Glucose - for patients with diabetes    Collection Time: 05/03/21  6:28 AM    Specimen: Blood   Result Value Ref Range    Glucose 220 (H) 70 - 139 mg/dL   POCT Glucose - for patients with diabetes    Collection Time: 05/03/21 11:50 AM    Specimen: Blood   Result Value Ref Range    Glucose 263 (H) 70 - 139 mg/dL   POCT Glucose - for patients with diabetes    Collection Time: 05/03/21  4:58 PM    Specimen: Blood   Result Value Ref Range    Glucose 266 (H) 70 - 139 mg/dL   POCT Glucose - for patients with diabetes    Collection Time: 05/03/21  9:10 PM    Specimen: Blood   Result Value Ref Range    Glucose 290 (H) 70 - 139 mg/dL   Crossmatch    Collection Time: 05/04/21 12:06 AM   Result Value Ref Range    Crossmatch Compatible     Unit Type A Neg     Unit Number [id number]     Status Transfused     Component Red Blood Cells     PRODUCT CODE [id number]     Issue Date and Time [id number]     Blood Type 0600     CODING SYSTEM [id number]    Crossmatch    Collection Time: 05/04/21 12:06 AM   Result Value Ref Range    Crossmatch Compatible     Unit Type A Neg     Unit Number [id number]     Status Transfused     Component Red Blood Cells     PRODUCT CODE [id number]     Issue Date and Time [id number]     Blood Type 0600     CODING SYSTEM [id number]    Potassium    Collection Time: 05/04/21  5:58 AM   Result Value Ref Range    Potassium 3.4 (L) 3.5 - 5.0 mmol/L   Hemoglobin POD1 and POD2    Collection Time: 05/04/21  5:58 AM   Result Value Ref Range    Hemoglobin 8.4 (L) 14.0 -  18.0 g/dL   Magnesium    Collection Time: 05/04/21  5:58 AM   Result Value Ref Range    Magnesium 2.0 1.8 - 2.6 mg/dL   POCT Glucose - for patients with diabetes    Collection Time: 05/04/21  6:09 AM    Specimen: Blood   Result Value Ref Range    Glucose 192 (H) 70 - 139 mg/dL   POCT Glucose - for patients with diabetes    Collection Time: 05/04/21 11:41 AM    Specimen: Blood   Result Value Ref Range    Glucose 217 (H) 70 - 139 mg/dL   Potassium    Collection Time: 05/04/21  2:05 PM   Result Value Ref Range    Potassium 3.9 3.5 - 5.0 mmol/L   POCT Glucose - for patients with diabetes    Collection Time: 05/04/21  5:17 PM    Specimen: Blood   Result Value Ref Range    Glucose 286 (H) 70 - 139 mg/dL   POCT Glucose - for patients with diabetes    Collection Time: 05/04/21  8:55 PM    Specimen: Blood   Result Value Ref Range    Glucose 283 (H) 70 - 139 mg/dL   Magnesium    Collection Time: 05/05/21  6:06 AM   Result Value Ref Range    Magnesium 1.8 1.8 - 2.6 mg/dL   Basic Metabolic Panel    Collection Time: 05/05/21  6:06 AM   Result Value Ref Range    Sodium 140 136 - 145 mmol/L    Potassium 3.1 (L) 3.5 - 5.0 mmol/L    Chloride 102 98 - 107 mmol/L    CO2 27 22 - 31 mmol/L    Anion Gap, Calculation 11 5 - 18 mmol/L    Glucose 215 (H) 70 - 125 mg/dL    Calcium 8.1 (L) 8.5 - 10.5 mg/dL    BUN 37 (H) 8 - 28 mg/dL    Creatinine 1.56 (H) 0.70 - 1.30 mg/dL    GFR MDRD Af Amer 50 (L) >60 mL/min/1.73m2    GFR MDRD Non Af Amer 42 (L) >60 mL/min/1.73m2   POCT Glucose - for patients with diabetes    Collection Time: 05/05/21  6:48 AM    Specimen: Blood   Result Value Ref Range    Glucose 196 (H) 70 - 139 mg/dL   Asymptomatic SARS-CoV-2 (COVID-19)-PCR    Collection Time: 05/05/21 11:47 AM    Specimen: Respiratory   Result Value Ref Range    SARS-CoV-2 PCR Result Negative Negative, Invalid   POCT Glucose - for patients with diabetes    Collection Time: 05/05/21  1:57 PM    Specimen: Blood   Result Value Ref Range    Glucose 242 (H)  70 - 139 mg/dL   POCT Glucose - for patients with diabetes    Collection Time: 05/05/21  4:52 PM    Specimen: Blood   Result Value Ref Range    Glucose 359 (H) 70 - 139 mg/dL   POCT Glucose - for patients with diabetes    Collection Time: 05/05/21  8:19 PM    Specimen: Blood   Result Value Ref Range    Glucose 393 (H) 70 - 139 mg/dL   Potassium    Collection Time: 05/05/21 10:06 PM   Result Value Ref Range    Potassium 4.0 3.5 - 5.0 mmol/L   Magnesium    Collection Time: 05/06/21  6:07 AM   Result Value Ref Range    Magnesium 1.7 (L) 1.8 - 2.6 mg/dL   Potassium - Next AM    Collection Time: 05/06/21  6:07 AM   Result Value Ref Range    Potassium 3.6 3.5 - 5.0 mmol/L   POCT Glucose - for patients with diabetes    Collection Time: 05/06/21  6:44 AM    Specimen: Blood   Result Value Ref Range    Glucose 266 (H) 70 - 139 mg/dL   POCT Glucose    Collection Time: 05/06/21  9:33 AM    Specimen: Blood   Result Value Ref Range    Glucose 253 (H) 70 - 139 mg/dL   POCT Glucose - for patients with diabetes    Collection Time: 05/06/21  1:07 PM    Specimen: Blood   Result Value Ref Range    Glucose 213 (H) 70 - 139 mg/dL            Imaging Results     Echo Complete    Result Date: 5/2/2021  1. Normal left ventricular size and systolic performance with a visually estimated ejection fraction of 55%.  There is basal inferior akinesis. 2. There is moderate aortic stenosis. 3. There is trace aortic insufficiency. 4. There is moderate calcific mitral stenosis. 5. There is mild to moderate mitral insufficiency. 6. Normal right ventricular size and systolic performance. 7. There is moderate left atrial enlargement. 8. Right ventricular systolic pressure relative to right atrial pressure is mildly increased.  The pulmonary artery pressure is estimated to be 35-40 mmHg plus right atrial pressure (the IVC is of fairly normal caliber). When compared to the prior real-time echocardiogram dated 29 November 2020, the findings are felt to be  fairly similar on both examinations.  Of note, although not commented upon in the prior report; the the basal inferior wall motion abnormality was felt to be present on the prior examination as well.    Xr Chest 1 View Portable    Result Date: 5/2/2021  EXAM: XR CHEST 1 VIEW PORTABLE LOCATION: [facility] DATE/TIME: 5/2/2021 7:01 PM INDICATION: sob COMPARISON: 5/1/2021     Patient is mildly rotated with ectatic calcified thoracic aorta in retrocardiac region. There is also likely a hiatal hernia in the retrocardiac region. Heart size normal. Increase opacity left lung base suggests atelectasis or infiltrate, in addition to the ectatic aorta and hiatal hernia. Left upper lobe and right lung are clear.    Xr Chest 1 View Portable    Result Date: 4/26/2021  EXAM: XR CHEST 1 VIEW PORTABLE LOCATION: [facility] DATE/TIME: 4/26/2021 6:18 AM INDICATION: Chest pain. COMPARISON: 2/26/2021. FINDINGS: The heart is enlarged. There are increased interstitial markings at the lung bases suggesting mild interstitial edema. The lungs are otherwise clear. Thoracic aorta is calcified and tortuous. The lungs are otherwise clear. Large hiatal hernia. No pneumothorax is evident though the patient's chin obscures the lung apices.     Mild interstitial edema.         Ct Head Without Contrast    Result Date: 5/1/2021  EXAM: CT HEAD WO CONTRAST LOCATION: [facility] DATE/TIME: 5/1/2021 2:46 PM INDICATION: Fall, head trauma, pain COMPARISON: 11/28/2020 head CT TECHNIQUE: Routine CT Head without IV contrast. Multiplanar reformats. Dose reduction techniques were used. FINDINGS: INTRACRANIAL CONTENTS: No intracranial hemorrhage, extraaxial collection, or mass effect.  No CT evidence of acute infarct. Tiny chronic infarct within the right cerebellum, and chronic bilateral basal ganglia and right thalamic lacunar infarcts. Severe presumed chronic small vessel ischemic  changes. Mild to moderate generalized volume loss. No hydrocephalus. VISUALIZED ORBITS/SINUSES/MASTOIDS: Prior right cataract surgery. Visualized portions of the orbits are otherwise unremarkable. No paranasal sinus mucosal disease. No middle ear or mastoid effusion. BONES/SOFT TISSUES: No acute abnormality.     1.  No CT evidence for acute intracranial process. 2.  Brain atrophy and presumed chronic microvascular ischemic changes as above. 3.  Multiple chronic lacunar infarcts.    Xr Chest 1 View    Result Date: 5/1/2021  EXAM: XR CHEST 1 VIEW LOCATION: [facility] DATE/TIME: 5/1/2021 3:02 PM INDICATION: Preop evaluation for proximal left femur fracture. COMPARISON: 4/26/2021     Stable cardiomegaly with normal vascularity. Large retrocardiac hiatal hernia. No focal consolidation, pneumothorax nor pleural effusion.    Xr Femur Left 2 Vws Portable    Result Date: 5/1/2021  EXAM: XR FEMUR LEFT 2 VWS PORTABLE LOCATION: [facility] DATE/TIME: 5/1/2021 8:54 PM INDICATION: 2v of full femur for surgical planning COMPARISON: 5/1/2021 at 1450     Again noted is the left proximal femoral fracture with varus angulation. No visualized distal femoral fracture. Vascular calcifications.    Xr C Arm Greater Than One Hour    Result Date: 5/2/2021  Please see Operative or Procedure Note for details on this exam or Radiology Report for body part of interest (if applicable).     Xr Pelvis W 2 Vw Hip Left    Result Date: 5/1/2021  EXAM: XR PELVIS WITH 2 VIEW HIP LEFT LOCATION: [facility] DATE/TIME: 05/01/2021, 3:02 PM INDICATION: Fall, pain. COMPARISON: None FINDINGS: Lumbar spine degenerative change and mild scoliosis.     Comminuted intertrochanteric left hip fracture with angulation and displacement.           Electronically signed by  SUSAN Oropeza

## 2021-06-17 NOTE — ANESTHESIA POSTPROCEDURE EVALUATION
Patient: Wicho Zhao  Procedure(s):  INTERNAL FIXATION, FRACTURE, TROCHANTERIC, LEFT HIP, USING NIAL (Left)  Anesthesia type: general    Patient location: PACU  Last vitals:   Vitals Value Taken Time   /58 05/02/21 1750   Temp 36.4  C (97.5  F) 05/02/21 1619   Pulse 69 05/02/21 1753   Resp 18 05/02/21 1753   SpO2 97 % 05/02/21 1753   Vitals shown include unvalidated device data.  Post vital signs: stable  Level of consciousness: confused Pt is not oriented to self, time or place. Does not follow simple commands but does answer simple questions.  Post-anesthesia pain: pain controlled  Post-anesthesia nausea and vomiting: no  Pulmonary: BIPAP  Cardiovascular: stable, slightly hypotensive, not requiring pressors at this time,  Hydration: adequate  Anesthetic events: no    QCDR Measures:  ASA# 11 - Nat-op Cardiac Arrest: ASA11B - Patient did NOT experience unanticipated cardiac arrest  ASA# 12 - Nat-op Mortality Rate: ASA12B - Patient did NOT die  ASA# 13 - PACU Re-Intubation Rate: ASA13B - Patient did NOT require a new airway mgmt  ASA# 10 - Composite Anes Safety: ASA10A - No serious adverse event    Additional Notes:    Given patient is requiring BiPAP at 60% FiO2 with 13/6 he will be admitted to the ICU for further cares. Spoke to both the hospitalist and intensivist who are both in agreement with the plan.

## 2021-06-17 NOTE — PROGRESS NOTES
[facility] note      Patient: Wicho Zhao  MRN: [id number]      [facility] [[id number]]  Reason for Visit     Chief Complaint   Patient presents with     Review Of Multiple Medical Conditions   follow-up dementia/pain/dm    Code Status     DNR    Assessment     Left hip intertrochanteric fracture status post ORIF on 5/2/2021  Non-ST elevation MI cardial infarction  Acute diastolic congestive heart failure status post diuresis  Postoperative encephalopathy  Anemia requiring 2 units of packed RBC transfusion  Acute urinary retention  Moderate malnutrition  Cognitive impairment  Acute fall in the TCU  Generalized weakness    Plan     Patient admitted to the TCU for strengthening and rehab.  Continue with his incisional cares.  Weightbearing as tolerated.  Pain is adequately controlled on his current regimen and patient denies any pain  He is on scheduled Tylenol.  Orthopedic appointment for follow-up scheduled for next week  rewiegh requested as he has not been having weight loss in the TCU with more than 6 pounds of weight loss recorded since admission.  Staff requested to monitor intake  Also to recheck her weight to ensure that this is an accurate weight loss  Dietary consultation requested to ensure adequate intake.  Diabetic control remains suboptimal with blood sugars greater than 200  Glipizide will be increased to 7.5mg  Continue Metformin  R/c labs    History     Patient is a very pleasant 89 y.o. male who is admitted to TCU  Patient admitted to the hospital after a mechanical fall.  He was diagnosed with a left hip intertrochanteric fracture.  Orthopedics was consulted.  He underwent surgical repair on 5/2/2021.  Postoperatively he has been discharged to the TCU  Apparently incision is intact with a surgical dressing.  He will follow up with orthopedics next week  Unfortunately he had a very  lengthy postoperative course with multiple complications.  He developed acute blood  loss anemia requiring 2 units of blood transfusion.  Discharge hemoglobin did improve to 8.4.  He developed postoperative urinary retention.  He is on Flomax.  They had a Trujillo catheter in place which was removed prior to discharge.  In addition he developed postoperative encephalopathy.  He was on BiPAP and required ICU support.  While in the hospital he had delirium with recurrent agitation and was given Seroquel.  Family has been calling concerned about the fact that his behaviors are still not back to baseline   they have been requesting Ativan  Patient has been frequently yelling for help rather than requesting and communicating his needs  He is not alert and oriented to his surroundings.  He also had an acute fall in the TCU.  No injuries he is attempting self transfer  Participation in therapy is limited    Past Medical History     Active Ambulatory (Non-Hospital) Problems    Diagnosis     Cognitive and behavioral changes     Postoperative delirium     Sleep difficulties     Metabolic encephalopathy     Acute respiratory failure with hypoxia (H)     Coronary artery disease involving native coronary artery of native heart without angina pectoris     Hypertension, unspecified type     Mitral valve stenosis, unspecified etiology     Dyslipidemia     Abnormal electrocardiogram     Status post aortic valve replacement     Closed intertrochanteric fracture of left hip, initial encounter (H)     Hip fracture requiring operative repair, left, closed, initial encounter (H)     Closed displaced intertrochanteric fracture of left femur, initial encounter (H)     Chest pain     Prolonged Q-T interval on ECG     Nonrheumatic aortic valve stenosis     Non-STEMI (non-ST elevated myocardial infarction) (H)     DNAR (do not attempt resuscitation)     Acute non-ST elevation myocardial infarction (NSTEMI) (H)     Hypomagnesemia     Stage 3 chronic kidney disease     Neovascular glaucoma of left eye, severe stage     Primary  open angle glaucoma of right eye, moderate stage     Mild aortic stenosis     Cervical kyphosis     Diabetic peripheral neuropathy associated with type 2 diabetes mellitus (H)     Glaucoma     Chronic anemia     Hyperlipidemia     Diabetes mellitus (H)     Essential hypertension     Past Medical History:   Diagnosis Date     Basal Cell Carcinoma Of The Skin Of The Trunk      Cataract 02/03/2016     Central retinal vein occlusion of left eye 02/03/2016     Chronic anemia 12/14/2014     Diabetes (H)      DNAR (do not attempt resuscitation)      Essential hypertension      Hyperlipidemia      Hypertension      Major depression in complete remission (H) 08/21/2019     Mild aortic stenosis 03/29/2017     Nuclear senile cataract of left eye 03/08/2018     Retinal hemorrhage, left 02/03/2016     Stage 3 chronic kidney disease 08/21/2019       Past Social History     Reviewed, and he  reports that he quit smoking about 42 years ago. His smoking use included cigarettes. He has a 10.00 pack-year smoking history. He has never used smokeless tobacco. He reports current alcohol use. He reports that he does not use drugs.    Family History     Reviewed, and family history includes Diabetes in his father; Hypertension in his mother; No Medical Problems in his daughter, daughter, and daughter.    Medication List   Post Discharge Medication Reconciliation Status: discharge medications reconciled, continue medications without change   Current Outpatient Medications on File Prior to Visit   Medication Sig Dispense Refill     acetaminophen (TYLENOL) 325 MG tablet Take 3 tablets (975 mg total) by mouth every 8 (eight) hours.  0     amLODIPine (NORVASC) 5 MG tablet Take 1 tablet (5 mg total) by mouth daily.  0     aspirin 81 MG EC tablet Take 1 tablet (81 mg total) by mouth daily.  0     atorvastatin (LIPITOR) 40 MG tablet Take 40 mg by mouth at bedtime.        [START ON 6/17/2021] clopidogreL (PLAVIX) 75 mg tablet Take 1 tablet (75 mg  total) by mouth daily. AFTER 6 WEEKS OF LOVENOX 1 tablet 0     enoxaparin ANTICOAGULANT (LOVENOX) 30 mg/0.3 mL syringe Inject 0.3 mL (30 mg total) under the skin daily. 35 Syringe 0     escitalopram oxalate (LEXAPRO) 5 MG tablet TAKE 1 TABLET BY MOUTH EVERY DAY 90 tablet 0     ferrous sulfate 325 (65 FE) MG tablet Take 1 tablet (325 mg total) by mouth every other day.  0     furosemide (LASIX) 40 MG tablet Take 1 tablet (40 mg total) by mouth daily.  0     gabapentin (NEURONTIN) 100 MG capsule Take 600 mg by mouth at bedtime.  0     glipiZIDE (GLUCOTROL) 5 MG tablet Take 5 mg by mouth daily.       isosorbide mononitrate (IMDUR) 30 MG 24 hr tablet Take 3 tablets (90 mg total) by mouth daily. 90 tablet 0     latanoprost (XALATAN) 0.005 % ophthalmic solution Administer 1 drop to both eyes bedtime.       magnesium oxide 250 mg magnesium Tab Take 1 tablet (250 mg total) by mouth 2 (two) times a day.  0     melatonin 3 mg Tab tablet Take 1 tablet (3 mg total) by mouth at bedtime.  0     metFORMIN (GLUCOPHAGE) 500 MG tablet TAKE 2 TABLETS BY MOUTH TWICE A DAY WITH FOOD 360 tablet 1     metoprolol succinate (TOPROL-XL) 50 MG 24 hr tablet Take 50 mg by mouth daily.       mirtazapine (REMERON) 7.5 MG tablet Take 1 tablet (7.5 mg total) by mouth at bedtime.  0     nitroglycerin (NITROSTAT) 0.4 MG SL tablet Take 0.4 mg by mouth every 5 (five) minutes as needed for chest pain.       polyethylene glycol (MIRALAX) 17 gram packet Take 1 packet (17 g total) by mouth daily as needed.  0     potassium chloride (KLOR-CON) 20 mEq packet Take 20 mEq by mouth daily. Crush and give with apple sauce  0     QUEtiapine (SEROQUEL) 25 MG tablet Take 0.5 tablets (12.5 mg total) by mouth 2 (two) times a day as needed (agitation).  0     QUEtiapine (SEROQUEL) 25 MG tablet Take 0.5 tablets (12.5 mg total) by mouth at bedtime for 7 days.  0     senna-docusate (PERICOLACE) 8.6-50 mg tablet Take 1 tablet by mouth 2 (two) times a day. 60 tablet 0      sodium chloride (OCEAN) 0.65 % nasal spray 1 spray into each nostril as needed for congestion.       tamsulosin (FLOMAX) 0.4 mg cap Take 1 capsule (0.4 mg total) by mouth daily.  0     No current facility-administered medications on file prior to visit.        Allergies     No Known Allergies    Review of Systems   A comprehensive review of 14 systems was done. Pertinent findings noted here and in history of present illness. All the rest negative.  Constitutional: Negative.  Negative for fever, chills, activity change, appetite change and fatigue.   HENT: Negative for congestion and facial swelling.    Eyes: Negative for photophobia, redness and visual disturbance.   Respiratory: Negative for cough and chest tightness.    Cardiovascular: Negative for chest pain, palpitations and leg swelling.   Gastrointestinal: Negative for nausea, diarrhea, constipation, blood in stool and abdominal distention.   Genitourinary: Negative.    Musculoskeletal: Negative.  Had an acute fall and this is suspected to be self transferring  Skin: Negative.    Neurological: Negative for dizziness, tremors, syncope, weakness, light-headedness and headaches.   Hematological: Does not bruise/bleed easily.   Psychiatric/Behavioral: Agitated repeatedly saying he wants to go home but not sure where his home is      Physical Exam     Recent Vitals 5/6/2021   Height -   Weight -   BSA (m2) -   /71   Pulse 95   Temp 97.4   Temp src 1   SpO2 91   Some recent data might be hidden       Constitutional: Oriented to person,  cachectic and appears well-developed.   HEENT:  Normocephalic and atraumatic.  Eyes: Conjunctivae and EOM are normal. Pupils are equal, round, and reactive to light. No discharge.  No scleral icterus. Nose normal. Mouth/Throat: Oropharynx is clear and moist. No oropharyngeal exudate.    NECK: Normal range of motion. Neck supple. No JVD present. No tracheal deviation present. No thyromegaly present.   CARDIOVASCULAR: Normal  rate, regular rhythm and intact distal pulses.  Exam reveals no gallop and no friction rub.  Systolic murmur present.  PULMONARY: Effort normal and breath sounds normal. No respiratory distress.No Wheezing or rales.  ABDOMEN: Soft. Bowel sounds are normal. No distension and no mass.  There is no tenderness. There is no rebound and no guarding. No HSM.  MUSCULOSKELETAL: Normal range of motion. No edema and no tenderness. Mild kyphosis, no tenderness.  L hip incision is intact  LYMPH NODES: Has no cervical, supraclavicular, axillary and groin adenopathy.   NEUROLOGICAL: Alert and oriented to persON. No cranial nerve deficit.  Normal muscle tone. Coordination normal.   GENITOURINARY: Deferred exam.  SKIN: Skin is warm and dry. No rash noted. No erythema. No pallor.   EXTREMITIES: No cyanosis, no clubbing, no edema. No Deformity.  PSYCHIATRIC: Normal mood, affect and behavior.  Very agitated repeatedly requesting that he be discharged home.  Not alert and oriented with no recall      Lab Results     Recent Results (from the past 240 hour(s))   POCT Glucose - for patients with diabetes    Collection Time: 05/03/21 11:50 AM    Specimen: Blood   Result Value Ref Range    Glucose 263 (H) 70 - 139 mg/dL   POCT Glucose - for patients with diabetes    Collection Time: 05/03/21  4:58 PM    Specimen: Blood   Result Value Ref Range    Glucose 266 (H) 70 - 139 mg/dL   POCT Glucose - for patients with diabetes    Collection Time: 05/03/21  9:10 PM    Specimen: Blood   Result Value Ref Range    Glucose 290 (H) 70 - 139 mg/dL   Crossmatch    Collection Time: 05/04/21 12:06 AM   Result Value Ref Range    Crossmatch Compatible     Unit Type A Neg     Unit Number [id number]     Status Transfused     Component Red Blood Cells     PRODUCT CODE S5695C66     Issue Date and Time [id number]     Blood Type 0600     CODING SYSTEM [id number]    Crossmatch    Collection Time: 05/04/21 12:06 AM   Result Value Ref Range    Crossmatch Compatible      Unit Type A Neg     Unit Number [id number]     Status Transfused     Component Red Blood Cells     PRODUCT CODE U5102S58     Issue Date and Time [id number]     Blood Type 0600     CODING SYSTEM [id number]    Potassium    Collection Time: 05/04/21  5:58 AM   Result Value Ref Range    Potassium 3.4 (L) 3.5 - 5.0 mmol/L   Hemoglobin POD1 and POD2    Collection Time: 05/04/21  5:58 AM   Result Value Ref Range    Hemoglobin 8.4 (L) 14.0 - 18.0 g/dL   Magnesium    Collection Time: 05/04/21  5:58 AM   Result Value Ref Range    Magnesium 2.0 1.8 - 2.6 mg/dL   POCT Glucose - for patients with diabetes    Collection Time: 05/04/21  6:09 AM    Specimen: Blood   Result Value Ref Range    Glucose 192 (H) 70 - 139 mg/dL   POCT Glucose - for patients with diabetes    Collection Time: 05/04/21 11:41 AM    Specimen: Blood   Result Value Ref Range    Glucose 217 (H) 70 - 139 mg/dL   Potassium    Collection Time: 05/04/21  2:05 PM   Result Value Ref Range    Potassium 3.9 3.5 - 5.0 mmol/L   POCT Glucose - for patients with diabetes    Collection Time: 05/04/21  5:17 PM    Specimen: Blood   Result Value Ref Range    Glucose 286 (H) 70 - 139 mg/dL   POCT Glucose - for patients with diabetes    Collection Time: 05/04/21  8:55 PM    Specimen: Blood   Result Value Ref Range    Glucose 283 (H) 70 - 139 mg/dL   Magnesium    Collection Time: 05/05/21  6:06 AM   Result Value Ref Range    Magnesium 1.8 1.8 - 2.6 mg/dL   Basic Metabolic Panel    Collection Time: 05/05/21  6:06 AM   Result Value Ref Range    Sodium 140 136 - 145 mmol/L    Potassium 3.1 (L) 3.5 - 5.0 mmol/L    Chloride 102 98 - 107 mmol/L    CO2 27 22 - 31 mmol/L    Anion Gap, Calculation 11 5 - 18 mmol/L    Glucose 215 (H) 70 - 125 mg/dL    Calcium 8.1 (L) 8.5 - 10.5 mg/dL    BUN 37 (H) 8 - 28 mg/dL    Creatinine 1.56 (H) 0.70 - 1.30 mg/dL    GFR MDRD Af Amer 50 (L) >60 mL/min/1.73m2    GFR MDRD Non Af Amer 42 (L) >60 mL/min/1.73m2   POCT Glucose - for patients with  diabetes    Collection Time: 05/05/21  6:48 AM    Specimen: Blood   Result Value Ref Range    Glucose 196 (H) 70 - 139 mg/dL   Asymptomatic SARS-CoV-2 (COVID-19)-PCR    Collection Time: 05/05/21 11:47 AM    Specimen: Respiratory   Result Value Ref Range    SARS-CoV-2 PCR Result Negative Negative, Invalid   POCT Glucose - for patients with diabetes    Collection Time: 05/05/21  1:57 PM    Specimen: Blood   Result Value Ref Range    Glucose 242 (H) 70 - 139 mg/dL   POCT Glucose - for patients with diabetes    Collection Time: 05/05/21  4:52 PM    Specimen: Blood   Result Value Ref Range    Glucose 359 (H) 70 - 139 mg/dL   POCT Glucose - for patients with diabetes    Collection Time: 05/05/21  8:19 PM    Specimen: Blood   Result Value Ref Range    Glucose 393 (H) 70 - 139 mg/dL   Potassium    Collection Time: 05/05/21 10:06 PM   Result Value Ref Range    Potassium 4.0 3.5 - 5.0 mmol/L   Magnesium    Collection Time: 05/06/21  6:07 AM   Result Value Ref Range    Magnesium 1.7 (L) 1.8 - 2.6 mg/dL   Potassium - Next AM    Collection Time: 05/06/21  6:07 AM   Result Value Ref Range    Potassium 3.6 3.5 - 5.0 mmol/L   POCT Glucose - for patients with diabetes    Collection Time: 05/06/21  6:44 AM    Specimen: Blood   Result Value Ref Range    Glucose 266 (H) 70 - 139 mg/dL   POCT Glucose    Collection Time: 05/06/21  9:33 AM    Specimen: Blood   Result Value Ref Range    Glucose 253 (H) 70 - 139 mg/dL   POCT Glucose - for patients with diabetes    Collection Time: 05/06/21  1:07 PM    Specimen: Blood   Result Value Ref Range    Glucose 213 (H) 70 - 139 mg/dL   QFT-Quantiferon TB Gold Plus    Collection Time: 05/08/21  8:48 AM   Result Value Ref Range    Quantiferon-TB Gold Plus Negative Negative    TB1 Ag minus Nil Value -0.03 IU/mL    TB2 Ag minus Nil Value -0.02 IU/mL    Mitogen minus Nil Result 2.45 IU/mL    Nil Result 0.07 IU/mL   QFT-Quantiferon TB Gold Plus Grey Tube    Collection Time: 05/08/21  8:48 AM   Result  Value Ref Range    Quantiferon Nil Tube 0.07 IU/mL   QFT-Quantiferon TB Gold Plus Green Tube    Collection Time: 05/08/21  8:48 AM   Result Value Ref Range    Quantiferon TB1 Tube 0.04 IU/mL   QFT-Quantieron TB Gold Yellow Tube    Collection Time: 05/08/21  8:48 AM   Result Value Ref Range    Quantiferon TB2 Tube 0.05    QFT-Quantiferon TB Gold Purple Tube    Collection Time: 05/08/21  8:48 AM   Result Value Ref Range    Quantiferon Mitogen 2.52 IU/mL            Electronically signed by  SUSAN Oropeza

## 2021-06-17 NOTE — PROGRESS NOTES
[facility] note      Patient: Wicho Zhao  MRN: [id number]      [facility] [[id number]]  Reason for Visit     Chief Complaint   Patient presents with     Review Of Multiple Medical Conditions   follow-up dementia/pain/dm    Code Status     DNR    Assessment     Hypotension with low bp noted  Wt loss >7lb in tcu  DM with sub optimal control and elevated BG  Left hip intertrochanteric fracture status post ORIF on 5/2/2021  Non-ST elevation MI cardial infarction  Acute diastolic congestive heart failure status post diuresis  Postoperative encephalopathy  Anemia requiring 2 units of packed RBC transfusion  Acute urinary retention  Moderate malnutrition  Cognitive impairment  Acute fall in the TCU  Generalized weakness    Plan     Patient admitted to the TCU for strengthening and rehab.  Seen today for multiple concerns including weight loss hypotension, suboptimal blood sugar control as well as dementia  Continue with his incisional cares.  Weightbearing as tolerated.  He has had a scheduled follow-up with orthopedics.  As per them he is doing well incision is healing well and he will continue weightbearing as tolerated  lovenox for 6 weeks for DVT prophylaxis per Ortho  Pain is adequately controlled on his current regimen and patient denies any pain  Noted to have frequent bouts of low blood pressures.  Patient unfortunately has dementia and is a poor historian  Amlodipine hold parameters given  Also on Lasix 40 mg with no evidence of lymphedema   he appears to be quite euvolemic.  Weight loss of 7 pounds has been noted in the TCU.  Suspect there may be lack of compliance of Lasix at home.  Discontinue Lasix 40 mg  Lasix 20mg p.o. daily  Recheck BMP reviewed and his creatinine is stable at 1.4.  Continue to monitor weights and kidney functions closely electrolytes are stable  Ensure/boost supplementation to be added due to a 7 pound weight loss.  I did talk to the dietitian   he is  eating 50% -100% of the offered meal  Staff is reporting that he is frequently having diarrhea.  We will change his schedules senna to senna prn  R/c labs  Cognitively remains impaired with a slums of 13/30.  Mood and behaviors do have improved he does have Seroquel available as needed  Recheck anemia is resolving nicely with a hemoglobin of 10.4 now.  His blood sugar checks have been increased to twice daily because of suboptimal control again   not a candidate for tight control  We will adjust his medications only blood sugars are persistently high    History     Patient is a very pleasant 89 y.o. male who is admitted to TCU  Patient admitted to the hospital after a mechanical fall.  He was diagnosed with a left hip intertrochanteric fracture.  Orthopedics was consulted.  He underwent surgical repair on 5/2/2021.  Postoperatively he has been discharged to the TCU  Apparently incision is intact   Staples have been removed and his incision is left open to air it appears to be healing well.  He has a scheduled follow-up with orthopedics and was told to continue Lovenox for a total duration of 6 weeks  Unfortunately he had a very  lengthy postoperative course with multiple complications.  He developed acute blood loss anemia requiring 2 units of blood transfusion.  Discharge hemoglobin did improve to 8.4.  Recheck hemoglobin done in the TCU is improved to 10.4  He developed postoperative urinary retention.  He is on Flomax.  They had a Trujillo catheter in place which was removed prior to discharge.  In addition he developed postoperative encephalopathy.  He was on BiPAP and required ICU support.  While in the hospital he had delirium with recurrent agitation and was given Seroquel.  Family has been calling concerned about the fact that his behaviors are still not back to baseline   they have been requesting Ativan  Patient has been frequently yelling for help rather than requesting and communicating his needs  He is not  alert and oriented to his surroundings.  Cognitively he is testing impaired slums is 13/30  He also had an acute fall in the TCU.  No injuries he is attempting self transfer  Participation in therapy is limited  Blood pressures are frequently noted to be on the low side.  He has lost 7 pounds in the TCU    Past Medical History     Active Ambulatory (Non-Hospital) Problems    Diagnosis     Cognitive and behavioral changes     Postoperative delirium     Sleep difficulties     Metabolic encephalopathy     Acute respiratory failure with hypoxia (H)     Coronary artery disease involving native coronary artery of native heart without angina pectoris     Hypertension, unspecified type     Mitral valve stenosis, unspecified etiology     Dyslipidemia     Abnormal electrocardiogram     Status post aortic valve replacement     Closed intertrochanteric fracture of left hip, initial encounter (H)     Hip fracture requiring operative repair, left, closed, initial encounter (H)     Closed displaced intertrochanteric fracture of left femur, initial encounter (H)     Chest pain     Prolonged Q-T interval on ECG     Nonrheumatic aortic valve stenosis     Non-STEMI (non-ST elevated myocardial infarction) (H)     DNAR (do not attempt resuscitation)     Acute non-ST elevation myocardial infarction (NSTEMI) (H)     Hypomagnesemia     Stage 3 chronic kidney disease     Neovascular glaucoma of left eye, severe stage     Primary open angle glaucoma of right eye, moderate stage     Mild aortic stenosis     Cervical kyphosis     Diabetic peripheral neuropathy associated with type 2 diabetes mellitus (H)     Glaucoma     Chronic anemia     Hyperlipidemia     Diabetes mellitus (H)     Essential hypertension     Past Medical History:   Diagnosis Date     Basal Cell Carcinoma Of The Skin Of The Trunk      Cataract 02/03/2016     Central retinal vein occlusion of left eye 02/03/2016     Chronic anemia 12/14/2014     Diabetes (H)      DNAR (do not  attempt resuscitation)      Essential hypertension      Hyperlipidemia      Hypertension      Major depression in complete remission (H) 08/21/2019     Mild aortic stenosis 03/29/2017     Nuclear senile cataract of left eye 03/08/2018     Retinal hemorrhage, left 02/03/2016     Stage 3 chronic kidney disease 08/21/2019       Past Social History     Reviewed, and he  reports that he quit smoking about 42 years ago. His smoking use included cigarettes. He has a 10.00 pack-year smoking history. He has never used smokeless tobacco. He reports current alcohol use. He reports that he does not use drugs.    Family History     Reviewed, and family history includes Diabetes in his father; Hypertension in his mother; No Medical Problems in his daughter, daughter, and daughter.    Medication List   Post Discharge Medication Reconciliation Status: discharge medications reconciled, continue medications without change   Current Outpatient Medications on File Prior to Visit   Medication Sig Dispense Refill     acetaminophen (TYLENOL) 325 MG tablet Take 3 tablets (975 mg total) by mouth every 8 (eight) hours.  0     amLODIPine (NORVASC) 5 MG tablet Take 1 tablet (5 mg total) by mouth daily.  0     aspirin 81 MG EC tablet Take 1 tablet (81 mg total) by mouth daily.  0     atorvastatin (LIPITOR) 40 MG tablet Take 40 mg by mouth at bedtime.        [START ON 6/17/2021] clopidogreL (PLAVIX) 75 mg tablet Take 1 tablet (75 mg total) by mouth daily. AFTER 6 WEEKS OF LOVENOX 1 tablet 0     enoxaparin ANTICOAGULANT (LOVENOX) 30 mg/0.3 mL syringe Inject 0.3 mL (30 mg total) under the skin daily. 35 Syringe 0     escitalopram oxalate (LEXAPRO) 5 MG tablet TAKE 1 TABLET BY MOUTH EVERY DAY 90 tablet 0     ferrous sulfate 325 (65 FE) MG tablet Take 1 tablet (325 mg total) by mouth every other day.  0     furosemide (LASIX) 40 MG tablet Take 1 tablet (40 mg total) by mouth daily.  0     gabapentin (NEURONTIN) 100 MG capsule Take 600 mg by mouth at  bedtime.  0     glipiZIDE (GLUCOTROL) 5 MG tablet Take 5 mg by mouth daily.       isosorbide mononitrate (IMDUR) 30 MG 24 hr tablet Take 3 tablets (90 mg total) by mouth daily. 90 tablet 0     latanoprost (XALATAN) 0.005 % ophthalmic solution Administer 1 drop to both eyes bedtime.       magnesium oxide 250 mg magnesium Tab Take 1 tablet (250 mg total) by mouth 2 (two) times a day.  0     melatonin 3 mg Tab tablet Take 1 tablet (3 mg total) by mouth at bedtime.  0     metFORMIN (GLUCOPHAGE) 500 MG tablet TAKE 2 TABLETS BY MOUTH TWICE A DAY WITH FOOD 360 tablet 1     metoprolol succinate (TOPROL-XL) 50 MG 24 hr tablet Take 50 mg by mouth daily.       mirtazapine (REMERON) 7.5 MG tablet Take 1 tablet (7.5 mg total) by mouth at bedtime.  0     nitroglycerin (NITROSTAT) 0.4 MG SL tablet Take 0.4 mg by mouth every 5 (five) minutes as needed for chest pain.       polyethylene glycol (MIRALAX) 17 gram packet Take 1 packet (17 g total) by mouth daily as needed.  0     potassium chloride (K-DUR,KLOR-CON) 20 MEQ tablet Take 20 mEq by mouth 2 (two) times a day.       QUEtiapine (SEROQUEL) 25 MG tablet Take 0.5 tablets (12.5 mg total) by mouth 2 (two) times a day as needed (agitation).  0     senna-docusate (PERICOLACE) 8.6-50 mg tablet Take 1 tablet by mouth 2 (two) times a day. 60 tablet 0     sodium chloride (OCEAN) 0.65 % nasal spray 1 spray into each nostril as needed for congestion.       tamsulosin (FLOMAX) 0.4 mg cap Take 1 capsule (0.4 mg total) by mouth daily.  0     No current facility-administered medications on file prior to visit.        Allergies     No Known Allergies    Review of Systems   A comprehensive review of 14 systems was done. Pertinent findings noted here and in history of present illness. All the rest negative.  Constitutional: Negative.  Negative for fever, chills, he has activity change, appetite change and fatigue.   Weight loss of 7 pounds since admission.  Oral intake though is more than 50%  consistently  HENT: Negative for congestion and facial swelling.    Eyes: Negative for photophobia, redness and visual disturbance.   Respiratory: Negative for cough and chest tightness.    Cardiovascular: Negative for chest pain, palpitations and leg swelling.   Gastrointestinal: Negative for nausea, diarrhea, constipation, blood in stool and abdominal distention.   Genitourinary: Negative.    Musculoskeletal: Negative.  Had an acute fall and this is suspected to be self transferring  Skin: Negative.    Neurological: Negative for dizziness, tremors, syncope, weakness, light-headedness and headaches.   Hematological: Does not bruise/bleed easily.   Psychiatric/Behavioral: Agitated repeatedly saying he wants to go home but not sure where his home is      Physical Exam     Recent Vitals 5/13/2021   Height -   Weight 134 lbs 10 oz   BSA (m2) 1.77 m2   /68   Pulse 82   Temp 98.2   Temp src -   SpO2 95   Some recent data might be hidden   Admission weight was 140 pounds today his weight is 133 pounds.  Blood pressure 105/60    Constitutional: Oriented to person,  cachectic and appears well-developed.   HEENT:  Normocephalic and atraumatic.  Eyes: Conjunctivae and EOM are normal. Pupils are equal, round, and reactive to light. No discharge.  No scleral icterus. Nose normal. Mouth/Throat: Oropharynx is clear and moist. No oropharyngeal exudate.    NECK: Normal range of motion. Neck supple. No JVD present. No tracheal deviation present. No thyromegaly present.   CARDIOVASCULAR: Normal rate, regular rhythm and intact distal pulses.  Exam reveals no gallop and no friction rub.  Systolic murmur present.  PULMONARY: Effort normal and breath sounds normal. No respiratory distress.No Wheezing or rales.  ABDOMEN: Soft. Bowel sounds are normal. No distension and no mass.  There is no tenderness. There is no rebound and no guarding. No HSM.  MUSCULOSKELETAL: Normal range of motion. No edema and no tenderness. Mild kyphosis, no  tenderness.  L hip incision is intact  LYMPH NODES: Has no cervical, supraclavicular, axillary and groin adenopathy.   NEUROLOGICAL: Alert and oriented to persON. No cranial nerve deficit.  Normal muscle tone. Coordination normal.   GENITOURINARY: Deferred exam.  SKIN: Skin is warm and dry. No rash noted. No erythema. No pallor.   EXTREMITIES: No cyanosis, no clubbing, no edema. No Deformity.  PSYCHIATRIC: Normal mood, affect and behavior.  Very agitated repeatedly requesting that he be discharged home.  Not alert and oriented with no recall      Lab Results     Recent Results (from the past 240 hour(s))   HM2(CBC w/o Differential)    Collection Time: 05/14/21  8:25 AM   Result Value Ref Range    WBC 8.9 4.0 - 11.0 thou/uL    RBC 3.17 (L) 4.40 - 6.20 mill/uL    Hemoglobin 9.9 (L) 14.0 - 18.0 g/dL    Hematocrit 31.2 (L) 40.0 - 54.0 %    MCV 98 80 - 100 fL    MCH 31.2 27.0 - 34.0 pg    MCHC 31.7 (L) 32.0 - 36.0 g/dL    RDW 17.8 (H) 11.0 - 14.5 %    Platelets 424 140 - 440 thou/uL    MPV 9.8 8.5 - 12.5 fL   Basic Metabolic Panel    Collection Time: 05/14/21  8:25 AM   Result Value Ref Range    Sodium 145 136 - 145 mmol/L    Potassium 3.2 (L) 3.5 - 5.0 mmol/L    Chloride 107 98 - 107 mmol/L    CO2 24 22 - 31 mmol/L    Anion Gap, Calculation 14 5 - 18 mmol/L    Glucose 179 (H) 70 - 125 mg/dL    Calcium 9.3 8.5 - 10.5 mg/dL    BUN 33 (H) 8 - 28 mg/dL    Creatinine 1.39 (H) 0.70 - 1.30 mg/dL    GFR MDRD Af Amer 58 (L) >60 mL/min/1.73m2    GFR MDRD Non Af Amer 48 (L) >60 mL/min/1.73m2   Basic Metabolic Panel    Collection Time: 05/18/21  8:26 AM   Result Value Ref Range    Sodium 140 136 - 145 mmol/L    Potassium 3.6 3.5 - 5.0 mmol/L    Chloride 108 (H) 98 - 107 mmol/L    CO2 21 (L) 22 - 31 mmol/L    Anion Gap, Calculation 11 5 - 18 mmol/L    Glucose 173 (H) 70 - 125 mg/dL    Calcium 9.3 8.5 - 10.5 mg/dL    BUN 26 8 - 28 mg/dL    Creatinine 1.40 (H) 0.70 - 1.30 mg/dL    GFR MDRD Af Amer 57 (L) >60 mL/min/1.73m2    GFR MDRD  Non Af Amer 47 (L) >60 mL/min/1.73m2   HM2(CBC w/o Differential)    Collection Time: 05/18/21  8:26 AM   Result Value Ref Range    WBC 10.9 4.0 - 11.0 thou/uL    RBC 3.36 (L) 4.40 - 6.20 mill/uL    Hemoglobin 10.4 (L) 14.0 - 18.0 g/dL    Hematocrit 33.9 (L) 40.0 - 54.0 %     (H) 80 - 100 fL    MCH 31.0 27.0 - 34.0 pg    MCHC 30.7 (L) 32.0 - 36.0 g/dL    RDW 19.3 (H) 11.0 - 14.5 %    Platelets 527 (H) 140 - 440 thou/uL    MPV 9.6 8.5 - 12.5 fL   Magnesium    Collection Time: 05/18/21  8:26 AM   Result Value Ref Range    Magnesium 1.8 1.8 - 2.6 mg/dL            Electronically signed by  SUSAN Oropeza

## 2021-06-17 NOTE — ANESTHESIA CARE TRANSFER NOTE
Last vitals:   Vitals:    05/02/21 1630   BP: 116/61   Pulse: 90   Resp: 20   Temp:    SpO2: 90%     Patient's level of consciousness is drowsy  Spontaneous respirations: yes  Maintains airway independently: yes  Dentition unchanged: yes  Oropharynx: oropharynx clear of all foreign objects    QCDR Measures:  ASA# 20 - Surgical Safety Checklist: WHO surgical safety checklist completed prior to induction    PQRS# 430 - Adult PONV Prevention: 4558F - Pt received => 2 anti-emetic agents (different classes) preop & intraop  ASA# 8 - Peds PONV Prevention: NA - Not pediatric patient, not GA or 2 or more risk factors NOT present  PQRS# 424 - Nat-op Temp Management: 4559F - At least one body temp DOCUMENTED => 35.5C or 95.9F within required timeframe  PQRS# 426 - PACU Transfer Protocol: - Transfer of care checklist used  ASA# 14 - Acute Post-op Pain: ASA14B - Patient did NOT experience pain >= 7 out of 10

## 2021-06-17 NOTE — TELEPHONE ENCOUNTER
[facility] Nurse Triage Telephone Note      Provider: Jessica Oneal MD  Facility: [facility] Type: TCU    Caller: Arleth  Call Back Number:  [phone]    Allergies: Patient has no known allergies.    Reason for call: Nurse calling to report Heme 2 and BMP results.  Notable meds:  Metformin 1000mg two times a day, Isosorbide mononitrate ER 90mg daily, Norvasc 5mg daily, ASA 81mg daily, Lovenox 30mg Q HS, ferrous sulfate 325mg every other day, Metoprolol ER 50mg daily, Lasix 40mg daily, Glipizide 7.5mg daily, potassium 20meq daily.       Verbal Order/Direction given by Provider: Give potassium 60meq now, then starting tomorrow, increase potassium to 20meq two times a day.      Provider giving order: Jessica Oneal MD    Verbal order given to: Arleth Nix RN

## 2021-06-18 NOTE — PROGRESS NOTES
Assessment:  1.  Right ear wound check.  2.  Partial suture removal.    Plan: Follow-up in 4 days for next wound check and further suture removal.  Keep the area clean and dry.  He understands and agrees.    Subjective: 89-year-old male here for wound check.  See the note earlier this week.  He notes it is feeling fine.  He has no complaints.  No fever or pussy drainage or pain etc.  Past Medical History:   Diagnosis Date     Depression      Diabetes (H)      Diabetes (H)      Hyperlipidemia      Hypertension      No Known Allergies  Current Outpatient Prescriptions   Medication Sig Dispense Refill     aspirin 81 MG EC tablet Take 81 mg by mouth bedtime.       blood glucose test (GLUCOSE BLOOD) strips Use 1 each As Directed daily before breakfast. Dispense brand per patient's insurance at pharmacy discretion. 200 strip 1     brimonidine (ALPHAGAN P) 0.1 % Drop 1 drop 3 times daily       cephalexin (KEFLEX) 500 MG capsule Take 1 capsule (500 mg total) by mouth 4 (four) times a day for 7 days. 28 capsule 0     escitalopram oxalate (LEXAPRO) 5 MG tablet Take 1 tablet (5 mg total) by mouth daily. 30 tablet 1     furosemide (LASIX) 20 MG tablet Take 1 tablet (20 mg total) by mouth daily. 90 tablet 3     gabapentin (NEURONTIN) 600 MG tablet TAKE ONE TABLET BY MOUTH NIGHTLY AT BEDTIME 90 tablet 3     glipiZIDE-metFORMIN (METAGLIP) 5-500 mg per tablet Take 2 tablets by mouth 2 (two) times a day. 360 tablet 0     hydroCHLOROthiazide (MICROZIDE) 12.5 mg capsule TAKE ONE CAPSULE BY MOUTH ONCE DAILY 90 capsule 3     latanoprost (XALATAN) 0.005 % ophthalmic solution Administer 1 drop to both eyes bedtime.       lisinopril (PRINIVIL,ZESTRIL) 20 MG tablet TAKE ONE TABLET BY MOUTH ONE TIME DAILY 90 tablet 3     lovastatin (MEVACOR) 20 MG tablet Take 1 tablet (20 mg total) by mouth every evening. 90 tablet 3     metoprolol succinate (TOPROL-XL) 25 MG TAKE 1 TABLET (25 MG TOTAL) BY MOUTH DAILY. 90 tablet 2     potassium chloride  (KLOR-CON M20) 20 MEQ tablet Take 1 tablet (20 mEq total) by mouth daily. 90 tablet 2     sodium chloride (OCEAN) 0.65 % nasal spray 1 spray into each nostril as needed for congestion.       timolol (TIMOPTIC-XR) 0.5 % ophthalmic gel-forming 1 drop every morning       verapamil (VERELAN PM) 240 MG 24 hr capsule TAKE ONE CAPSULE BY MOUTH ONE TIME DAILY 90 capsule 2     No current facility-administered medications for this visit.      Objective:/70  Pulse (!) 55  Temp 97.5  F (36.4  C) (Oral)   Resp 22  Wt 144 lb (65.3 kg)  SpO2 90%  BMI 20.37 kg/m2  Alert elderly male.  Very minimal edema in the upper part of the pinna above the area of the laceration.  No sign of purulence.  Removed a total of 4 sutures including 2 behind the ear and 2 in front of the pinna.  Because of how the sutures were placed, each one is very difficult to find the small loop to cut the suture and remove it.  It looked best to leave the remaining sutures for now.  Remainder the exam shows no change.

## 2021-06-18 NOTE — PROGRESS NOTES
Assessment:  1.  Non-insulin-dependent diabetes mellitus, checking status.  2.  Hypertension controlled.  3.  Hyperlipidemia checking status.  4.  Depression, not under best control.    Plan: Check fasting A1c, lipid hepatic and basic profiles.  Will try changing him to S-Citalopram 5 mg-1 p.o. daily-#30 refillable ×1.  He will reduce paroxetine to 10 mg today and tomorrow and then stop it on Friday when he starts the above medicine.  Follow-up in 6 weeks regarding recheck on the depression but earlier as needed.  He understands and agrees.    Subjective: 89-year-old male presenting for follow-up on multiple issues as above.  Regarding diabetes he does check blood sugars daily and notes that usually in the 120-150 range.  This morning he had a reading of 68 and so he took several glucose pills before coming but is otherwise fasting.  Regarding hypertension no headaches or dizziness or leg swelling.  Regarding lipids no diarrhea constipation or muscle aching.  Regarding depression his PHQ-9 score today is 11.  He has been on the fluoxetine since many years ago when he was seeing Dr. Freeman.  He has not been tried on anything else.  He did recently have 4 teeth removed at the dentist.  Past Medical History:   Diagnosis Date     Depression      Diabetes (H)      Diabetes (H)      Hyperlipidemia      Hypertension      No Known Allergies  Current Outpatient Prescriptions   Medication Sig Dispense Refill     aspirin 81 MG EC tablet Take 81 mg by mouth bedtime.       blood glucose test (GLUCOSE BLOOD) strips Use 1 each As Directed daily before breakfast. Dispense brand per patient's insurance at pharmacy discretion. 200 strip 1     brimonidine (ALPHAGAN P) 0.1 % Drop 1 drop 3 times daily       furosemide (LASIX) 20 MG tablet Take 1 tablet (20 mg total) by mouth daily. 90 tablet 3     gabapentin (NEURONTIN) 600 MG tablet TAKE ONE TABLET BY MOUTH NIGHTLY AT BEDTIME 90 tablet 3     glipiZIDE-metFORMIN (METAGLIP) 5-500 mg per  "tablet TAKE 2 TABLETS BY MOUTH TWICE DAILY 360 tablet 0     hydroCHLOROthiazide (MICROZIDE) 12.5 mg capsule TAKE ONE CAPSULE BY MOUTH ONCE DAILY 90 capsule 3     latanoprost (XALATAN) 0.005 % ophthalmic solution Administer 1 drop to both eyes bedtime.       lisinopril (PRINIVIL,ZESTRIL) 20 MG tablet TAKE ONE TABLET BY MOUTH ONE TIME DAILY 90 tablet 3     lovastatin (MEVACOR) 20 MG tablet Take 1 tablet (20 mg total) by mouth every evening. 90 tablet 0     metoprolol succinate (TOPROL-XL) 25 MG TAKE 1 TABLET (25 MG TOTAL) BY MOUTH DAILY. 90 tablet 2     potassium chloride (KLOR-CON M20) 20 MEQ tablet Take 1 tablet (20 mEq total) by mouth daily. 90 tablet 2     sodium chloride (OCEAN) 0.65 % nasal spray 1 spray into each nostril as needed for congestion.       timolol (TIMOPTIC-XR) 0.5 % ophthalmic gel-forming 1 drop every morning       verapamil (VERELAN PM) 240 MG 24 hr capsule TAKE ONE CAPSULE BY MOUTH ONE TIME DAILY 90 capsule 2     escitalopram oxalate (LEXAPRO) 5 MG tablet Take 1 tablet (5 mg total) by mouth daily. 30 tablet 1     No current facility-administered medications for this visit.      Note that the above list is after I had taken off the paroxetine from the list and had prescribed the S-Citalopram.  He has been taking the paroxetine 20 mg daily.    Objective:/80  Pulse 95  Resp 22  Ht 5' 10.5\" (1.791 m)  Wt 144 lb (65.3 kg)  SpO2 95%  BMI 20.37 kg/m2  HEENT shows no acute change.  He has the left eye difficulty and is followed with ophthalmology.  Neck supple.  Lungs clear.  Heart regular rate and rhythm without murmur.  Abdomen shows no masses tenderness or hepatosplenomegaly.  No pedal edema.  He does have some kyphosis.  He does ambulate independently but states that he keeps a cane with him in the car.  "

## 2021-06-18 NOTE — PROGRESS NOTES
Assessment:  1.  Right ear laceration occurring 2 days ago.  Appropriate healing  2.  Mechanical fall from tripping.    Plan: Continue to protect the area, continue to take his cephalexin antibiotic, follow-up in 3 days i.e. Thursday morning for recheck visit and partial removal of the sutures.  Call earlier or return earlier as needed.  He understands and agrees.    Subjective: 89-year-old male presenting for follow-up of ER visit of the morning of June 23.  He had the right ear laceration and it was sutured with 12 sutures of 5-0 Ethilon.  He notes the dressing came off during the night last night.  He notes he tripped on his walker when he fell early that morning about 4 AM.  He notes that the ear is feeling okay.  He is taking his antibiotic on a regular basis.  He is not having any significant pain with it.  He notes that they were told to follow-up with ENT but they were not able to get in to see any your nose and throat physician today and his daughter set up the follow-up here then with me.  Past Medical History:   Diagnosis Date     Depression      Diabetes (H)      Diabetes (H)      Hyperlipidemia      Hypertension      No Known Allergies  Current Outpatient Prescriptions   Medication Sig Dispense Refill     aspirin 81 MG EC tablet Take 81 mg by mouth bedtime.       blood glucose test (GLUCOSE BLOOD) strips Use 1 each As Directed daily before breakfast. Dispense brand per patient's insurance at pharmacy discretion. 200 strip 1     brimonidine (ALPHAGAN P) 0.1 % Drop 1 drop 3 times daily       cephalexin (KEFLEX) 500 MG capsule Take 1 capsule (500 mg total) by mouth 4 (four) times a day for 7 days. 28 capsule 0     escitalopram oxalate (LEXAPRO) 5 MG tablet Take 1 tablet (5 mg total) by mouth daily. 30 tablet 1     furosemide (LASIX) 20 MG tablet Take 1 tablet (20 mg total) by mouth daily. 90 tablet 3     gabapentin (NEURONTIN) 600 MG tablet TAKE ONE TABLET BY MOUTH NIGHTLY AT BEDTIME 90 tablet 3      "glipiZIDE-metFORMIN (METAGLIP) 5-500 mg per tablet TAKE 2 TABLETS BY MOUTH TWICE DAILY 360 tablet 0     hydroCHLOROthiazide (MICROZIDE) 12.5 mg capsule TAKE ONE CAPSULE BY MOUTH ONCE DAILY 90 capsule 3     latanoprost (XALATAN) 0.005 % ophthalmic solution Administer 1 drop to both eyes bedtime.       lisinopril (PRINIVIL,ZESTRIL) 20 MG tablet TAKE ONE TABLET BY MOUTH ONE TIME DAILY 90 tablet 3     lovastatin (MEVACOR) 20 MG tablet Take 1 tablet (20 mg total) by mouth every evening. 90 tablet 3     metoprolol succinate (TOPROL-XL) 25 MG TAKE 1 TABLET (25 MG TOTAL) BY MOUTH DAILY. 90 tablet 2     potassium chloride (KLOR-CON M20) 20 MEQ tablet Take 1 tablet (20 mEq total) by mouth daily. 90 tablet 2     sodium chloride (OCEAN) 0.65 % nasal spray 1 spray into each nostril as needed for congestion.       timolol (TIMOPTIC-XR) 0.5 % ophthalmic gel-forming 1 drop every morning       verapamil (VERELAN PM) 240 MG 24 hr capsule TAKE ONE CAPSULE BY MOUTH ONE TIME DAILY 90 capsule 2     No current facility-administered medications for this visit.      Objective:/60  Pulse 60  Ht 5' 10.5\" (1.791 m)  Wt 143 lb (64.9 kg)  SpO2 97%  BMI 20.23 kg/m2  Elderly male, alert with clear speech.  The right ear shows that the pin was basically severed in half with the initial injury and so the 12 sutures go horizontally from behind the back of the pinna to the front of the midportion of the pinna.  There are about 5 sutures on the backside in about 7 sutures on the front side.  It appears to be healing well.  There is only minimal swelling.  No purulence.  Only minimal erythema consistent with the stage of healing.  Neck supple without adenopathy.  Lungs clear.  Heart regular rate and rhythm.  "

## 2021-06-18 NOTE — PATIENT INSTRUCTIONS - HE
Patient Instructions by Amado Holland DO at 3/19/2021 10:30 AM     Author: Amado Holland DO Service: -- Author Type: Physician    Filed: 3/19/2021 11:01 AM Encounter Date: 3/19/2021 Status: Signed    : Amado Holland DO (Physician)       It was a pleasure to meet with you today in clinic.  Please do not hesitate to call the [facility] clinic with any questions or concerns at [phone].    Additional Provider Instructions:   1. Continue current medications   2. Call if any concerns.     Sincerely,

## 2021-06-19 NOTE — PROGRESS NOTES
Assessment:  1.  Depression, improved on current regimen.  2.  Underlying diabetes.    Plan: Continue escitalopram 5 mg-#90-refill x1-1 p.o. daily.  Follow-up in late September or October for next diabetic recheck and be fasting.  He understands and agrees.  I explained that there certainly is always the option of going to a higher dose but with how he is doing is perfectly appropriate to leave them at this current dose.    Subjective: 89-year-old male presenting for follow-up on the depression.  See the note of June 20 in the multiple notes since then.  He is off of the paroxetine medication.  He is taking the S-Citalopram 5 mg daily.  He thinks he is about the same as he was.  He feels he is doing okay.  He is comfortable with how he is at this point.  He notes that his main challenges his wife's progressive dementia.  She is here with him.  Past Medical History:   Diagnosis Date     Depression      Diabetes (H)      Diabetes (H)      Hyperlipidemia      Hypertension      No Known Allergies  Current Outpatient Prescriptions   Medication Sig Dispense Refill     aspirin 81 MG EC tablet Take 81 mg by mouth bedtime.       blood glucose test (GLUCOSE BLOOD) strips Use 1 each As Directed daily before breakfast. Dispense brand per patient's insurance at pharmacy discretion. 200 strip 1     brimonidine (ALPHAGAN P) 0.1 % Drop 1 drop 3 times daily       escitalopram oxalate (LEXAPRO) 5 MG tablet Take 1 tablet (5 mg total) by mouth daily. 90 tablet 1     furosemide (LASIX) 20 MG tablet Take 1 tablet (20 mg total) by mouth daily. 90 tablet 3     gabapentin (NEURONTIN) 600 MG tablet TAKE ONE TABLET BY MOUTH NIGHTLY AT BEDTIME 90 tablet 3     glipiZIDE-metFORMIN (METAGLIP) 5-500 mg per tablet Take 2 tablets by mouth 2 (two) times a day. 360 tablet 0     hydroCHLOROthiazide (MICROZIDE) 12.5 mg capsule TAKE ONE CAPSULE BY MOUTH ONCE DAILY 90 capsule 3     latanoprost (XALATAN) 0.005 % ophthalmic solution Administer 1 drop to  both eyes bedtime.       lisinopril (PRINIVIL,ZESTRIL) 20 MG tablet TAKE ONE TABLET BY MOUTH ONE TIME DAILY 90 tablet 3     lovastatin (MEVACOR) 20 MG tablet Take 1 tablet (20 mg total) by mouth every evening. 90 tablet 3     metoprolol succinate (TOPROL-XL) 25 MG TAKE 1 TABLET (25 MG TOTAL) BY MOUTH DAILY. 90 tablet 2     potassium chloride (KLOR-CON M20) 20 MEQ tablet Take 1 tablet (20 mEq total) by mouth daily. 90 tablet 2     sodium chloride (OCEAN) 0.65 % nasal spray 1 spray into each nostril as needed for congestion.       timolol (BETIMOL) 0.25 % ophthalmic solution 1-2 drops 2 (two) times a day.       verapamil (VERELAN PM) 240 MG 24 hr capsule TAKE ONE CAPSULE BY MOUTH ONE TIME DAILY 90 capsule 2     No current facility-administered medications for this visit.      He would be okay with a 90-day supply of medication because he notes that he cannot rely on [facility] to remind him given how it works with his wife's dementia and her answering the phone.    Objective:/66  Pulse 61  Resp 20  Wt 146 lb (66.2 kg)  SpO2 95%  BMI 20.65 kg/m2  Alert male.  No acute distress.  HEENT shows continued appropriate healing of the right earlobe.  PHQ 9 score today is 5.  This is substantially improved from the score of 11 on June 20.  He is alert with clear speech.  Affect looks neutral.  Mood is neutral.

## 2021-06-19 NOTE — PROGRESS NOTES
Assessment:  1.  Suture removal.  2.  Wound check.    Plan: Recommend he avoid any irritation or manipulation of the ear.  Follow-up in 9 days for what will likely be final wound check.  Call earlier if any difficulties.  He understands and agrees.    Subjective: 89-year-old male presenting for further follow-up regarding the right ear laceration.  Had the 4 sutures removed last Thursday.  He has noted no trouble and notes it feels fine.  Past Medical History:   Diagnosis Date     Depression      Diabetes (H)      Diabetes (H)      Hyperlipidemia      Hypertension      No Known Allergies  Current Outpatient Prescriptions   Medication Sig Dispense Refill     aspirin 81 MG EC tablet Take 81 mg by mouth bedtime.       blood glucose test (GLUCOSE BLOOD) strips Use 1 each As Directed daily before breakfast. Dispense brand per patient's insurance at pharmacy discretion. 200 strip 1     brimonidine (ALPHAGAN P) 0.1 % Drop 1 drop 3 times daily       escitalopram oxalate (LEXAPRO) 5 MG tablet Take 1 tablet (5 mg total) by mouth daily. 30 tablet 1     furosemide (LASIX) 20 MG tablet Take 1 tablet (20 mg total) by mouth daily. 90 tablet 3     gabapentin (NEURONTIN) 600 MG tablet TAKE ONE TABLET BY MOUTH NIGHTLY AT BEDTIME 90 tablet 3     glipiZIDE-metFORMIN (METAGLIP) 5-500 mg per tablet Take 2 tablets by mouth 2 (two) times a day. 360 tablet 0     hydroCHLOROthiazide (MICROZIDE) 12.5 mg capsule TAKE ONE CAPSULE BY MOUTH ONCE DAILY 90 capsule 3     latanoprost (XALATAN) 0.005 % ophthalmic solution Administer 1 drop to both eyes bedtime.       lisinopril (PRINIVIL,ZESTRIL) 20 MG tablet TAKE ONE TABLET BY MOUTH ONE TIME DAILY 90 tablet 3     lovastatin (MEVACOR) 20 MG tablet Take 1 tablet (20 mg total) by mouth every evening. 90 tablet 3     metoprolol succinate (TOPROL-XL) 25 MG TAKE 1 TABLET (25 MG TOTAL) BY MOUTH DAILY. 90 tablet 2     potassium chloride (KLOR-CON M20) 20 MEQ tablet Take 1 tablet (20 mEq total) by mouth  daily. 90 tablet 2     sodium chloride (OCEAN) 0.65 % nasal spray 1 spray into each nostril as needed for congestion.       timolol (TIMOPTIC-XR) 0.5 % ophthalmic gel-forming 1 drop every morning       verapamil (VERELAN PM) 240 MG 24 hr capsule TAKE ONE CAPSULE BY MOUTH ONE TIME DAILY 90 capsule 2     No current facility-administered medications for this visit.      Objective:/82  Pulse 67  Resp 18  Wt 143 lb (64.9 kg)  SpO2 91%  BMI 20.23 kg/m2  HEENT exam is overall unchanged.  The right ear shows there is no purulence, minimal erythema in the upper aspect of the right pinna.  The 8 remaining sutures are able to be removed today and I do not think that any portion of any of the sutures was left behind.  They were all very small with a tight loop to cut.  There does appear to be reasonable approximation of the pinna and appropriate healing.  There is very minimal step-off right at the edge of the pinna but this will likely heal very adequately for him.

## 2021-06-19 NOTE — PROGRESS NOTES
Assessment:  1.  Wound check.  2.  Follow-up depression.  3.  Underlying diabetes.    Plan: Reassured him regarding his ear.  Specifically did not pick out or remove any of the scab.  Continue his S-Citalopram 5 mg daily.  Follow-up in August as planned regarding that.  Continue careful efforts with diet etc.  Continue current dose of the glipizide metformin-2 tablets twice daily.  Call if any difficulties with low blood sugar.    Subjective: 89-year-old male presenting for follow-up on the above.  See the recent notes regarding the ear laceration and the suture removal.  He wonders if there is any suture material remaining there.  He does not feel any difference so far in having switched from the paroxetine to the S-Citalopram.  He feels the same.  He notes that he did have a blood sugar this morning of 77.  He did not have any symptoms with that.  He does take the medication as listed.m    No Known Allergies  Current Outpatient Prescriptions   Medication Sig Dispense Refill     aspirin 81 MG EC tablet Take 81 mg by mouth bedtime.       blood glucose test (GLUCOSE BLOOD) strips Use 1 each As Directed daily before breakfast. Dispense brand per patient's insurance at pharmacy discretion. 200 strip 1     brimonidine (ALPHAGAN P) 0.1 % Drop 1 drop 3 times daily       escitalopram oxalate (LEXAPRO) 5 MG tablet Take 1 tablet (5 mg total) by mouth daily. 30 tablet 1     furosemide (LASIX) 20 MG tablet Take 1 tablet (20 mg total) by mouth daily. 90 tablet 3     gabapentin (NEURONTIN) 600 MG tablet TAKE ONE TABLET BY MOUTH NIGHTLY AT BEDTIME 90 tablet 3     glipiZIDE-metFORMIN (METAGLIP) 5-500 mg per tablet Take 2 tablets by mouth 2 (two) times a day. 360 tablet 0     hydroCHLOROthiazide (MICROZIDE) 12.5 mg capsule TAKE ONE CAPSULE BY MOUTH ONCE DAILY 90 capsule 3     latanoprost (XALATAN) 0.005 % ophthalmic solution Administer 1 drop to both eyes bedtime.       lisinopril (PRINIVIL,ZESTRIL) 20 MG tablet TAKE ONE TABLET BY  MOUTH ONE TIME DAILY 90 tablet 3     lovastatin (MEVACOR) 20 MG tablet Take 1 tablet (20 mg total) by mouth every evening. 90 tablet 3     metoprolol succinate (TOPROL-XL) 25 MG TAKE 1 TABLET (25 MG TOTAL) BY MOUTH DAILY. 90 tablet 2     potassium chloride (KLOR-CON M20) 20 MEQ tablet Take 1 tablet (20 mEq total) by mouth daily. 90 tablet 2     sodium chloride (OCEAN) 0.65 % nasal spray 1 spray into each nostril as needed for congestion.       verapamil (VERELAN PM) 240 MG 24 hr capsule TAKE ONE CAPSULE BY MOUTH ONE TIME DAILY 90 capsule 2     No current facility-administered medications for this visit.      All other review of systems are negative.    Objective:/72  Pulse 64  Resp 16  Wt 142 lb (64.4 kg)  SpO2 97%  BMI 20.09 kg/m2  HEENT exam shows the right ear laceration is healing appropriately, there is slight step-off at the midportion of the pinna but the approximation is certainly very reasonable.  There is no sign of any residual suture material.  HEENT is otherwise not remarkable for any change.  Is alert with clear speech.

## 2021-06-20 NOTE — LETTER
Letter by Batsheva Moralez MD at      Author: Batsheva Moralez MD Service: -- Author Type: --    Filed:  Encounter Date: 7/2/2020 Status: (Other)         Wicho Zhao  [address] [facility] [place] [address]            July 2, 2020        Dear Mr. Zhao,        Below are the results from your recent visit:    Resulted Orders   Glycosylated Hemoglobin A1c   Result Value Ref Range    Hemoglobin A1c 7.3 (H) 3.5 - 6.0 %   HM2(CBC w/o Differential)   Result Value Ref Range    WBC 7.1 4.0 - 11.0 thou/uL    RBC 3.79 (L) 4.40 - 6.20 mill/uL    Hemoglobin 12.4 (L) 14.0 - 18.0 g/dL    Hematocrit 37.3 (L) 40.0 - 54.0 %    MCV 98 80 - 100 fL    MCH 32.8 27.0 - 34.0 pg    MCHC 33.4 32.0 - 36.0 g/dL    RDW 13.6 11.0 - 14.5 %    Platelets 299 140 - 440 thou/uL    MPV 7.2 7.0 - 10.0 fL                     Wicho, your laboratory results look good.  Keep up the good work.  We should visit again in 6 months.    Sincerely,        YUSUF Moralez MD, MARILEE  [facility]  [phone]

## 2021-06-21 NOTE — LETTER
Letter by Jessica Oneal MBBS at      Author: Jessica Oneal MBBS Service: -- Author Type: --    Filed:  Encounter Date: 5/13/2021 Status: (Other)         [facility]  [address] [address]                                  May 13, 2021    Patient: Wicho Zhao   MR Number: [id number]   YOB: 1927   Date of Visit: 5/13/2021     Dear Dr. Solis:    Thank you for referring Wicho Zhao to me for evaluation. Below are the relevant portions of my assessment and plan of care.    If you have questions, please do not hesitate to call me. I look forward to following Wicho along with you.    Sincerely,        SUSAN Oropeza          CC  No Recipients  Jessica Oneal MBBS  5/13/2021  2:07 PM  [facility] note      Patient: Wicho Zhao  MRN: [id number]      [facility] [[id number]]  Reason for Visit     Chief Complaint   Patient presents with   ? Review Of Multiple Medical Conditions   follow-up dementia/pain/dm    Code Status     DNR    Assessment     Left hip intertrochanteric fracture status post ORIF on 5/2/2021  Non-ST elevation MI cardial infarction  Acute diastolic congestive heart failure status post diuresis  Postoperative encephalopathy  Anemia requiring 2 units of packed RBC transfusion  Acute urinary retention  Moderate malnutrition  Cognitive impairment  Acute fall in the TCU  Generalized weakness    Plan     Patient admitted to the TCU for strengthening and rehab.  Continue with his incisional cares.  Weightbearing as tolerated.  Pain is adequately controlled on his current regimen and patient denies any pain  He is on scheduled Tylenol.  Orthopedic appointment for follow-up scheduled for next week  rewiegh requested as he has not been having weight loss in the TCU with more than 6 pounds of weight loss recorded since admission.  Staff requested to monitor intake  Also to recheck her weight to ensure that this is an  accurate weight loss  Dietary consultation requested to ensure adequate intake.  Diabetic control remains suboptimal with blood sugars greater than 200  Glipizide will be increased to 7.5mg  Continue Metformin  R/c labs    History     Patient is a very pleasant 89 y.o. male who is admitted to TCU  Patient admitted to the hospital after a mechanical fall.  He was diagnosed with a left hip intertrochanteric fracture.  Orthopedics was consulted.  He underwent surgical repair on 5/2/2021.  Postoperatively he has been discharged to the TCU  Apparently incision is intact with a surgical dressing.  He will follow up with orthopedics next week  Unfortunately he had a very  lengthy postoperative course with multiple complications.  He developed acute blood loss anemia requiring 2 units of blood transfusion.  Discharge hemoglobin did improve to 8.4.  He developed postoperative urinary retention.  He is on Flomax.  They had a Trujillo catheter in place which was removed prior to discharge.  In addition he developed postoperative encephalopathy.  He was on BiPAP and required ICU support.  While in the hospital he had delirium with recurrent agitation and was given Seroquel.  Family has been calling concerned about the fact that his behaviors are still not back to baseline   they have been requesting Ativan  Patient has been frequently yelling for help rather than requesting and communicating his needs  He is not alert and oriented to his surroundings.  He also had an acute fall in the TCU.  No injuries he is attempting self transfer  Participation in therapy is limited    Past Medical History     Active Ambulatory (Non-Hospital) Problems    Diagnosis   ? Cognitive and behavioral changes   ? Postoperative delirium   ? Sleep difficulties   ? Metabolic encephalopathy   ? Acute respiratory failure with hypoxia (H)   ? Coronary artery disease involving native coronary artery of native heart without angina pectoris   ? Hypertension,  unspecified type   ? Mitral valve stenosis, unspecified etiology   ? Dyslipidemia   ? Abnormal electrocardiogram   ? Status post aortic valve replacement   ? Closed intertrochanteric fracture of left hip, initial encounter (H)   ? Hip fracture requiring operative repair, left, closed, initial encounter (H)   ? Closed displaced intertrochanteric fracture of left femur, initial encounter (H)   ? Chest pain   ? Prolonged Q-T interval on ECG   ? Nonrheumatic aortic valve stenosis   ? Non-STEMI (non-ST elevated myocardial infarction) (H)   ? DNAR (do not attempt resuscitation)   ? Acute non-ST elevation myocardial infarction (NSTEMI) (H)   ? Hypomagnesemia   ? Stage 3 chronic kidney disease   ? Neovascular glaucoma of left eye, severe stage   ? Primary open angle glaucoma of right eye, moderate stage   ? Mild aortic stenosis   ? Cervical kyphosis   ? Diabetic peripheral neuropathy associated with type 2 diabetes mellitus (H)   ? Glaucoma   ? Chronic anemia   ? Hyperlipidemia   ? Diabetes mellitus (H)   ? Essential hypertension     Past Medical History:   Diagnosis Date   ? Basal Cell Carcinoma Of The Skin Of The Trunk    ? Cataract 02/03/2016   ? Central retinal vein occlusion of left eye 02/03/2016   ? Chronic anemia 12/14/2014   ? Diabetes (H)    ? DNAR (do not attempt resuscitation)    ? Essential hypertension    ? Hyperlipidemia    ? Hypertension    ? Major depression in complete remission (H) 08/21/2019   ? Mild aortic stenosis 03/29/2017   ? Nuclear senile cataract of left eye 03/08/2018   ? Retinal hemorrhage, left 02/03/2016   ? Stage 3 chronic kidney disease 08/21/2019       Past Social History     Reviewed, and he  reports that he quit smoking about 42 years ago. His smoking use included cigarettes. He has a 10.00 pack-year smoking history. He has never used smokeless tobacco. He reports current alcohol use. He reports that he does not use drugs.    Family History     Reviewed, and family history includes  Diabetes in his father; Hypertension in his mother; No Medical Problems in his daughter, daughter, and daughter.    Medication List   Post Discharge Medication Reconciliation Status: discharge medications reconciled, continue medications without change   Current Outpatient Medications on File Prior to Visit   Medication Sig Dispense Refill   ? acetaminophen (TYLENOL) 325 MG tablet Take 3 tablets (975 mg total) by mouth every 8 (eight) hours.  0   ? amLODIPine (NORVASC) 5 MG tablet Take 1 tablet (5 mg total) by mouth daily.  0   ? aspirin 81 MG EC tablet Take 1 tablet (81 mg total) by mouth daily.  0   ? atorvastatin (LIPITOR) 40 MG tablet Take 40 mg by mouth at bedtime.      ? [START ON 6/17/2021] clopidogreL (PLAVIX) 75 mg tablet Take 1 tablet (75 mg total) by mouth daily. AFTER 6 WEEKS OF LOVENOX 1 tablet 0   ? enoxaparin ANTICOAGULANT (LOVENOX) 30 mg/0.3 mL syringe Inject 0.3 mL (30 mg total) under the skin daily. 35 Syringe 0   ? escitalopram oxalate (LEXAPRO) 5 MG tablet TAKE 1 TABLET BY MOUTH EVERY DAY 90 tablet 0   ? ferrous sulfate 325 (65 FE) MG tablet Take 1 tablet (325 mg total) by mouth every other day.  0   ? furosemide (LASIX) 40 MG tablet Take 1 tablet (40 mg total) by mouth daily.  0   ? gabapentin (NEURONTIN) 100 MG capsule Take 600 mg by mouth at bedtime.  0   ? glipiZIDE (GLUCOTROL) 5 MG tablet Take 5 mg by mouth daily.     ? isosorbide mononitrate (IMDUR) 30 MG 24 hr tablet Take 3 tablets (90 mg total) by mouth daily. 90 tablet 0   ? latanoprost (XALATAN) 0.005 % ophthalmic solution Administer 1 drop to both eyes bedtime.     ? magnesium oxide 250 mg magnesium Tab Take 1 tablet (250 mg total) by mouth 2 (two) times a day.  0   ? melatonin 3 mg Tab tablet Take 1 tablet (3 mg total) by mouth at bedtime.  0   ? metFORMIN (GLUCOPHAGE) 500 MG tablet TAKE 2 TABLETS BY MOUTH TWICE A DAY WITH FOOD 360 tablet 1   ? metoprolol succinate (TOPROL-XL) 50 MG 24 hr tablet Take 50 mg by mouth daily.     ?  mirtazapine (REMERON) 7.5 MG tablet Take 1 tablet (7.5 mg total) by mouth at bedtime.  0   ? nitroglycerin (NITROSTAT) 0.4 MG SL tablet Take 0.4 mg by mouth every 5 (five) minutes as needed for chest pain.     ? polyethylene glycol (MIRALAX) 17 gram packet Take 1 packet (17 g total) by mouth daily as needed.  0   ? potassium chloride (KLOR-CON) 20 mEq packet Take 20 mEq by mouth daily. Crush and give with apple sauce  0   ? QUEtiapine (SEROQUEL) 25 MG tablet Take 0.5 tablets (12.5 mg total) by mouth 2 (two) times a day as needed (agitation).  0   ? QUEtiapine (SEROQUEL) 25 MG tablet Take 0.5 tablets (12.5 mg total) by mouth at bedtime for 7 days.  0   ? senna-docusate (PERICOLACE) 8.6-50 mg tablet Take 1 tablet by mouth 2 (two) times a day. 60 tablet 0   ? sodium chloride (OCEAN) 0.65 % nasal spray 1 spray into each nostril as needed for congestion.     ? tamsulosin (FLOMAX) 0.4 mg cap Take 1 capsule (0.4 mg total) by mouth daily.  0     No current facility-administered medications on file prior to visit.        Allergies     No Known Allergies    Review of Systems   A comprehensive review of 14 systems was done. Pertinent findings noted here and in history of present illness. All the rest negative.  Constitutional: Negative.  Negative for fever, chills, activity change, appetite change and fatigue.   HENT: Negative for congestion and facial swelling.    Eyes: Negative for photophobia, redness and visual disturbance.   Respiratory: Negative for cough and chest tightness.    Cardiovascular: Negative for chest pain, palpitations and leg swelling.   Gastrointestinal: Negative for nausea, diarrhea, constipation, blood in stool and abdominal distention.   Genitourinary: Negative.    Musculoskeletal: Negative.  Had an acute fall and this is suspected to be self transferring  Skin: Negative.    Neurological: Negative for dizziness, tremors, syncope, weakness, light-headedness and headaches.   Hematological: Does not  bruise/bleed easily.   Psychiatric/Behavioral: Agitated repeatedly saying he wants to go home but not sure where his home is      Physical Exam     Recent Vitals 5/6/2021   Height -   Weight -   BSA (m2) -   /71   Pulse 95   Temp 97.4   Temp src 1   SpO2 91   Some recent data might be hidden       Constitutional: Oriented to person,  cachectic and appears well-developed.   HEENT:  Normocephalic and atraumatic.  Eyes: Conjunctivae and EOM are normal. Pupils are equal, round, and reactive to light. No discharge.  No scleral icterus. Nose normal. Mouth/Throat: Oropharynx is clear and moist. No oropharyngeal exudate.    NECK: Normal range of motion. Neck supple. No JVD present. No tracheal deviation present. No thyromegaly present.   CARDIOVASCULAR: Normal rate, regular rhythm and intact distal pulses.  Exam reveals no gallop and no friction rub.  Systolic murmur present.  PULMONARY: Effort normal and breath sounds normal. No respiratory distress.No Wheezing or rales.  ABDOMEN: Soft. Bowel sounds are normal. No distension and no mass.  There is no tenderness. There is no rebound and no guarding. No HSM.  MUSCULOSKELETAL: Normal range of motion. No edema and no tenderness. Mild kyphosis, no tenderness.  L hip incision is intact  LYMPH NODES: Has no cervical, supraclavicular, axillary and groin adenopathy.   NEUROLOGICAL: Alert and oriented to persON. No cranial nerve deficit.  Normal muscle tone. Coordination normal.   GENITOURINARY: Deferred exam.  SKIN: Skin is warm and dry. No rash noted. No erythema. No pallor.   EXTREMITIES: No cyanosis, no clubbing, no edema. No Deformity.  PSYCHIATRIC: Normal mood, affect and behavior.  Very agitated repeatedly requesting that he be discharged home.  Not alert and oriented with no recall      Lab Results     Recent Results (from the past 240 hour(s))   POCT Glucose - for patients with diabetes    Collection Time: 05/03/21 11:50 AM    Specimen: Blood   Result Value Ref Range     Glucose 263 (H) 70 - 139 mg/dL   POCT Glucose - for patients with diabetes    Collection Time: 05/03/21  4:58 PM    Specimen: Blood   Result Value Ref Range    Glucose 266 (H) 70 - 139 mg/dL   POCT Glucose - for patients with diabetes    Collection Time: 05/03/21  9:10 PM    Specimen: Blood   Result Value Ref Range    Glucose 290 (H) 70 - 139 mg/dL   Crossmatch    Collection Time: 05/04/21 12:06 AM   Result Value Ref Range    Crossmatch Compatible     Unit Type A Neg     Unit Number [id number]     Status Transfused     Component Red Blood Cells     PRODUCT CODE T0143M13     Issue Date and Time [id number]     Blood Type 0600     CODING SYSTEM [id number]    Crossmatch    Collection Time: 05/04/21 12:06 AM   Result Value Ref Range    Crossmatch Compatible     Unit Type A Neg     Unit Number [id number]     Status Transfused     Component Red Blood Cells     PRODUCT CODE P6468P48     Issue Date and Time [id number]     Blood Type 0600     CODING SYSTEM [id number]    Potassium    Collection Time: 05/04/21  5:58 AM   Result Value Ref Range    Potassium 3.4 (L) 3.5 - 5.0 mmol/L   Hemoglobin POD1 and POD2    Collection Time: 05/04/21  5:58 AM   Result Value Ref Range    Hemoglobin 8.4 (L) 14.0 - 18.0 g/dL   Magnesium    Collection Time: 05/04/21  5:58 AM   Result Value Ref Range    Magnesium 2.0 1.8 - 2.6 mg/dL   POCT Glucose - for patients with diabetes    Collection Time: 05/04/21  6:09 AM    Specimen: Blood   Result Value Ref Range    Glucose 192 (H) 70 - 139 mg/dL   POCT Glucose - for patients with diabetes    Collection Time: 05/04/21 11:41 AM    Specimen: Blood   Result Value Ref Range    Glucose 217 (H) 70 - 139 mg/dL   Potassium    Collection Time: 05/04/21  2:05 PM   Result Value Ref Range    Potassium 3.9 3.5 - 5.0 mmol/L   POCT Glucose - for patients with diabetes    Collection Time: 05/04/21  5:17 PM    Specimen: Blood   Result Value Ref Range    Glucose 286 (H) 70 - 139 mg/dL   POCT Glucose - for  patients with diabetes    Collection Time: 05/04/21  8:55 PM    Specimen: Blood   Result Value Ref Range    Glucose 283 (H) 70 - 139 mg/dL   Magnesium    Collection Time: 05/05/21  6:06 AM   Result Value Ref Range    Magnesium 1.8 1.8 - 2.6 mg/dL   Basic Metabolic Panel    Collection Time: 05/05/21  6:06 AM   Result Value Ref Range    Sodium 140 136 - 145 mmol/L    Potassium 3.1 (L) 3.5 - 5.0 mmol/L    Chloride 102 98 - 107 mmol/L    CO2 27 22 - 31 mmol/L    Anion Gap, Calculation 11 5 - 18 mmol/L    Glucose 215 (H) 70 - 125 mg/dL    Calcium 8.1 (L) 8.5 - 10.5 mg/dL    BUN 37 (H) 8 - 28 mg/dL    Creatinine 1.56 (H) 0.70 - 1.30 mg/dL    GFR MDRD Af Amer 50 (L) >60 mL/min/1.73m2    GFR MDRD Non Af Amer 42 (L) >60 mL/min/1.73m2   POCT Glucose - for patients with diabetes    Collection Time: 05/05/21  6:48 AM    Specimen: Blood   Result Value Ref Range    Glucose 196 (H) 70 - 139 mg/dL   Asymptomatic SARS-CoV-2 (COVID-19)-PCR    Collection Time: 05/05/21 11:47 AM    Specimen: Respiratory   Result Value Ref Range    SARS-CoV-2 PCR Result Negative Negative, Invalid   POCT Glucose - for patients with diabetes    Collection Time: 05/05/21  1:57 PM    Specimen: Blood   Result Value Ref Range    Glucose 242 (H) 70 - 139 mg/dL   POCT Glucose - for patients with diabetes    Collection Time: 05/05/21  4:52 PM    Specimen: Blood   Result Value Ref Range    Glucose 359 (H) 70 - 139 mg/dL   POCT Glucose - for patients with diabetes    Collection Time: 05/05/21  8:19 PM    Specimen: Blood   Result Value Ref Range    Glucose 393 (H) 70 - 139 mg/dL   Potassium    Collection Time: 05/05/21 10:06 PM   Result Value Ref Range    Potassium 4.0 3.5 - 5.0 mmol/L   Magnesium    Collection Time: 05/06/21  6:07 AM   Result Value Ref Range    Magnesium 1.7 (L) 1.8 - 2.6 mg/dL   Potassium - Next AM    Collection Time: 05/06/21  6:07 AM   Result Value Ref Range    Potassium 3.6 3.5 - 5.0 mmol/L   POCT Glucose - for patients with diabetes     Collection Time: 05/06/21  6:44 AM    Specimen: Blood   Result Value Ref Range    Glucose 266 (H) 70 - 139 mg/dL   POCT Glucose    Collection Time: 05/06/21  9:33 AM    Specimen: Blood   Result Value Ref Range    Glucose 253 (H) 70 - 139 mg/dL   POCT Glucose - for patients with diabetes    Collection Time: 05/06/21  1:07 PM    Specimen: Blood   Result Value Ref Range    Glucose 213 (H) 70 - 139 mg/dL   QFT-Quantiferon TB Gold Plus    Collection Time: 05/08/21  8:48 AM   Result Value Ref Range    Quantiferon-TB Gold Plus Negative Negative    TB1 Ag minus Nil Value -0.03 IU/mL    TB2 Ag minus Nil Value -0.02 IU/mL    Mitogen minus Nil Result 2.45 IU/mL    Nil Result 0.07 IU/mL   QFT-Quantiferon TB Gold Plus Grey Tube    Collection Time: 05/08/21  8:48 AM   Result Value Ref Range    Quantiferon Nil Tube 0.07 IU/mL   QFT-Quantiferon TB Gold Plus Green Tube    Collection Time: 05/08/21  8:48 AM   Result Value Ref Range    Quantiferon TB1 Tube 0.04 IU/mL   QFT-Quantieron TB Gold Yellow Tube    Collection Time: 05/08/21  8:48 AM   Result Value Ref Range    Quantiferon TB2 Tube 0.05    QFT-Quantiferon TB Gold Purple Tube    Collection Time: 05/08/21  8:48 AM   Result Value Ref Range    Quantiferon Mitogen 2.52 IU/mL            Electronically signed by  SUSAN Oropeza Mona, MBBS  5/13/2021 11:11 AM  Sign when Signing Visit    [facility] note      Patient: Wicho Zhao  MRN: [id number]      [facility] [[id number]]  Reason for Visit     Chief Complaint   Patient presents with   ? Review Of Multiple Medical Conditions   follow-up dementia/pain/dm    Code Status     DNR    Assessment     Left hip intertrochanteric fracture status post ORIF on 5/2/2021  Non-ST elevation MI cardial infarction  Acute diastolic congestive heart failure status post diuresis  Postoperative encephalopathy  Anemia requiring 2 units of packed RBC transfusion  Acute urinary retention  Moderate  malnutrition  Cognitive impairment  Acute fall in the TCU  Generalized weakness    Plan     Patient admitted to the TCU for strengthening and rehab.  Continue with his incisional cares.  Weightbearing as tolerated.    rewiegh  Glipizide 7.5mg  R/c labs    History     Patient is a very pleasant 89 y.o. male who is admitted to TCU  Patient admitted to the hospital after a mechanical fall.  He was diagnosed with a left hip intertrochanteric fracture.  Orthopedics was consulted.  He underwent surgical repair on 5/2/2021.  Postoperatively he has been discharged to the TCU  Unfortunately he had a very  lengthy postoperative course with multiple complications.  He developed acute blood loss anemia requiring 2 units of blood transfusion.  Discharge hemoglobin did improve to 8.4.  He developed postoperative urinary retention.  He is on Flomax.  They had a Trujillo catheter in place which was removed prior to discharge.  In addition he developed postoperative encephalopathy.  He was on BiPAP and required ICU support.  While in the hospital he had delirium with recurrent agitation and was given Seroquel.  Unfortunately is quite agitated and repeatedly in the middle of the exam requesting that he be discharged home.  He is not alert and oriented to his surroundings.  He also had an acute fall in the TCU.  No injuries he is attempting self transfer      Past Medical History     Active Ambulatory (Non-Hospital) Problems    Diagnosis   ? Cognitive and behavioral changes   ? Postoperative delirium   ? Sleep difficulties   ? Metabolic encephalopathy   ? Acute respiratory failure with hypoxia (H)   ? Coronary artery disease involving native coronary artery of native heart without angina pectoris   ? Hypertension, unspecified type   ? Mitral valve stenosis, unspecified etiology   ? Dyslipidemia   ? Abnormal electrocardiogram   ? Status post aortic valve replacement   ? Closed intertrochanteric fracture of left hip, initial encounter (H)    ? Hip fracture requiring operative repair, left, closed, initial encounter (H)   ? Closed displaced intertrochanteric fracture of left femur, initial encounter (H)   ? Chest pain   ? Prolonged Q-T interval on ECG   ? Nonrheumatic aortic valve stenosis   ? Non-STEMI (non-ST elevated myocardial infarction) (H)   ? DNAR (do not attempt resuscitation)   ? Acute non-ST elevation myocardial infarction (NSTEMI) (H)   ? Hypomagnesemia   ? Stage 3 chronic kidney disease   ? Neovascular glaucoma of left eye, severe stage   ? Primary open angle glaucoma of right eye, moderate stage   ? Mild aortic stenosis   ? Cervical kyphosis   ? Diabetic peripheral neuropathy associated with type 2 diabetes mellitus (H)   ? Glaucoma   ? Chronic anemia   ? Hyperlipidemia   ? Diabetes mellitus (H)   ? Essential hypertension     Past Medical History:   Diagnosis Date   ? Basal Cell Carcinoma Of The Skin Of The Trunk    ? Cataract 02/03/2016   ? Central retinal vein occlusion of left eye 02/03/2016   ? Chronic anemia 12/14/2014   ? Diabetes (H)    ? DNAR (do not attempt resuscitation)    ? Essential hypertension    ? Hyperlipidemia    ? Hypertension    ? Major depression in complete remission (H) 08/21/2019   ? Mild aortic stenosis 03/29/2017   ? Nuclear senile cataract of left eye 03/08/2018   ? Retinal hemorrhage, left 02/03/2016   ? Stage 3 chronic kidney disease 08/21/2019       Past Social History     Reviewed, and he  reports that he quit smoking about 42 years ago. His smoking use included cigarettes. He has a 10.00 pack-year smoking history. He has never used smokeless tobacco. He reports current alcohol use. He reports that he does not use drugs.    Family History     Reviewed, and family history includes Diabetes in his father; Hypertension in his mother; No Medical Problems in his daughter, daughter, and daughter.    Medication List   Post Discharge Medication Reconciliation Status: discharge medications reconciled, continue  medications without change   Current Outpatient Medications on File Prior to Visit   Medication Sig Dispense Refill   ? acetaminophen (TYLENOL) 325 MG tablet Take 3 tablets (975 mg total) by mouth every 8 (eight) hours.  0   ? amLODIPine (NORVASC) 5 MG tablet Take 1 tablet (5 mg total) by mouth daily.  0   ? aspirin 81 MG EC tablet Take 1 tablet (81 mg total) by mouth daily.  0   ? atorvastatin (LIPITOR) 40 MG tablet Take 40 mg by mouth at bedtime.      ? [START ON 6/17/2021] clopidogreL (PLAVIX) 75 mg tablet Take 1 tablet (75 mg total) by mouth daily. AFTER 6 WEEKS OF LOVENOX 1 tablet 0   ? enoxaparin ANTICOAGULANT (LOVENOX) 30 mg/0.3 mL syringe Inject 0.3 mL (30 mg total) under the skin daily. 35 Syringe 0   ? escitalopram oxalate (LEXAPRO) 5 MG tablet TAKE 1 TABLET BY MOUTH EVERY DAY 90 tablet 0   ? ferrous sulfate 325 (65 FE) MG tablet Take 1 tablet (325 mg total) by mouth every other day.  0   ? furosemide (LASIX) 40 MG tablet Take 1 tablet (40 mg total) by mouth daily.  0   ? gabapentin (NEURONTIN) 100 MG capsule Take 600 mg by mouth at bedtime.  0   ? glipiZIDE (GLUCOTROL) 5 MG tablet Take 5 mg by mouth daily.     ? isosorbide mononitrate (IMDUR) 30 MG 24 hr tablet Take 3 tablets (90 mg total) by mouth daily. 90 tablet 0   ? latanoprost (XALATAN) 0.005 % ophthalmic solution Administer 1 drop to both eyes bedtime.     ? magnesium oxide 250 mg magnesium Tab Take 1 tablet (250 mg total) by mouth 2 (two) times a day.  0   ? melatonin 3 mg Tab tablet Take 1 tablet (3 mg total) by mouth at bedtime.  0   ? metFORMIN (GLUCOPHAGE) 500 MG tablet TAKE 2 TABLETS BY MOUTH TWICE A DAY WITH FOOD 360 tablet 1   ? metoprolol succinate (TOPROL-XL) 50 MG 24 hr tablet Take 50 mg by mouth daily.     ? mirtazapine (REMERON) 7.5 MG tablet Take 1 tablet (7.5 mg total) by mouth at bedtime.  0   ? nitroglycerin (NITROSTAT) 0.4 MG SL tablet Take 0.4 mg by mouth every 5 (five) minutes as needed for chest pain.     ? polyethylene glycol  (MIRALAX) 17 gram packet Take 1 packet (17 g total) by mouth daily as needed.  0   ? potassium chloride (KLOR-CON) 20 mEq packet Take 20 mEq by mouth daily. Crush and give with apple sauce  0   ? QUEtiapine (SEROQUEL) 25 MG tablet Take 0.5 tablets (12.5 mg total) by mouth 2 (two) times a day as needed (agitation).  0   ? QUEtiapine (SEROQUEL) 25 MG tablet Take 0.5 tablets (12.5 mg total) by mouth at bedtime for 7 days.  0   ? senna-docusate (PERICOLACE) 8.6-50 mg tablet Take 1 tablet by mouth 2 (two) times a day. 60 tablet 0   ? sodium chloride (OCEAN) 0.65 % nasal spray 1 spray into each nostril as needed for congestion.     ? tamsulosin (FLOMAX) 0.4 mg cap Take 1 capsule (0.4 mg total) by mouth daily.  0     No current facility-administered medications on file prior to visit.        Allergies     No Known Allergies    Review of Systems   A comprehensive review of 14 systems was done. Pertinent findings noted here and in history of present illness. All the rest negative.  Constitutional: Negative.  Negative for fever, chills, activity change, appetite change and fatigue.   HENT: Negative for congestion and facial swelling.    Eyes: Negative for photophobia, redness and visual disturbance.   Respiratory: Negative for cough and chest tightness.    Cardiovascular: Negative for chest pain, palpitations and leg swelling.   Gastrointestinal: Negative for nausea, diarrhea, constipation, blood in stool and abdominal distention.   Genitourinary: Negative.    Musculoskeletal: Negative.  Had an acute fall and this is suspected to be self transferring  Skin: Negative.    Neurological: Negative for dizziness, tremors, syncope, weakness, light-headedness and headaches.   Hematological: Does not bruise/bleed easily.   Psychiatric/Behavioral: Agitated repeatedly saying he wants to go home but not sure where his home is      Physical Exam     Recent Vitals 5/6/2021   Height -   Weight -   BSA (m2) -   /71   Pulse 95   Temp  97.4   Temp src 1   SpO2 91   Some recent data might be hidden       Constitutional: Oriented to person,  cachectic and appears well-developed.   HEENT:  Normocephalic and atraumatic.  Eyes: Conjunctivae and EOM are normal. Pupils are equal, round, and reactive to light. No discharge.  No scleral icterus. Nose normal. Mouth/Throat: Oropharynx is clear and moist. No oropharyngeal exudate.    NECK: Normal range of motion. Neck supple. No JVD present. No tracheal deviation present. No thyromegaly present.   CARDIOVASCULAR: Normal rate, regular rhythm and intact distal pulses.  Exam reveals no gallop and no friction rub.  Systolic murmur present.  PULMONARY: Effort normal and breath sounds normal. No respiratory distress.No Wheezing or rales.  ABDOMEN: Soft. Bowel sounds are normal. No distension and no mass.  There is no tenderness. There is no rebound and no guarding. No HSM.  MUSCULOSKELETAL: Normal range of motion. No edema and no tenderness. Mild kyphosis, no tenderness.  L hip incision is intact  LYMPH NODES: Has no cervical, supraclavicular, axillary and groin adenopathy.   NEUROLOGICAL: Alert and oriented to persON. No cranial nerve deficit.  Normal muscle tone. Coordination normal.   GENITOURINARY: Deferred exam.  SKIN: Skin is warm and dry. No rash noted. No erythema. No pallor.   EXTREMITIES: No cyanosis, no clubbing, no edema. No Deformity.  PSYCHIATRIC: Normal mood, affect and behavior.  Very agitated repeatedly requesting that he be discharged home.  Not alert and oriented with no recall      Lab Results     Recent Results (from the past 240 hour(s))   POCT Glucose - for patients with diabetes    Collection Time: 05/03/21 11:50 AM    Specimen: Blood   Result Value Ref Range    Glucose 263 (H) 70 - 139 mg/dL   POCT Glucose - for patients with diabetes    Collection Time: 05/03/21  4:58 PM    Specimen: Blood   Result Value Ref Range    Glucose 266 (H) 70 - 139 mg/dL   POCT Glucose - for patients with  diabetes    Collection Time: 05/03/21  9:10 PM    Specimen: Blood   Result Value Ref Range    Glucose 290 (H) 70 - 139 mg/dL   Crossmatch    Collection Time: 05/04/21 12:06 AM   Result Value Ref Range    Crossmatch Compatible     Unit Type A Neg     Unit Number [id number]     Status Transfused     Component Red Blood Cells     PRODUCT CODE C8054W77     Issue Date and Time [id number]     Blood Type 0600     CODING SYSTEM [id number]    Crossmatch    Collection Time: 05/04/21 12:06 AM   Result Value Ref Range    Crossmatch Compatible     Unit Type A Neg     Unit Number [id number]     Status Transfused     Component Red Blood Cells     PRODUCT CODE [id number]     Issue Date and Time [id number]     Blood Type 0600     CODING SYSTEM [id number]    Potassium    Collection Time: 05/04/21  5:58 AM   Result Value Ref Range    Potassium 3.4 (L) 3.5 - 5.0 mmol/L   Hemoglobin POD1 and POD2    Collection Time: 05/04/21  5:58 AM   Result Value Ref Range    Hemoglobin 8.4 (L) 14.0 - 18.0 g/dL   Magnesium    Collection Time: 05/04/21  5:58 AM   Result Value Ref Range    Magnesium 2.0 1.8 - 2.6 mg/dL   POCT Glucose - for patients with diabetes    Collection Time: 05/04/21  6:09 AM    Specimen: Blood   Result Value Ref Range    Glucose 192 (H) 70 - 139 mg/dL   POCT Glucose - for patients with diabetes    Collection Time: 05/04/21 11:41 AM    Specimen: Blood   Result Value Ref Range    Glucose 217 (H) 70 - 139 mg/dL   Potassium    Collection Time: 05/04/21  2:05 PM   Result Value Ref Range    Potassium 3.9 3.5 - 5.0 mmol/L   POCT Glucose - for patients with diabetes    Collection Time: 05/04/21  5:17 PM    Specimen: Blood   Result Value Ref Range    Glucose 286 (H) 70 - 139 mg/dL   POCT Glucose - for patients with diabetes    Collection Time: 05/04/21  8:55 PM    Specimen: Blood   Result Value Ref Range    Glucose 283 (H) 70 - 139 mg/dL   Magnesium    Collection Time: 05/05/21  6:06 AM   Result Value Ref Range    Magnesium 1.8 1.8  - 2.6 mg/dL   Basic Metabolic Panel    Collection Time: 05/05/21  6:06 AM   Result Value Ref Range    Sodium 140 136 - 145 mmol/L    Potassium 3.1 (L) 3.5 - 5.0 mmol/L    Chloride 102 98 - 107 mmol/L    CO2 27 22 - 31 mmol/L    Anion Gap, Calculation 11 5 - 18 mmol/L    Glucose 215 (H) 70 - 125 mg/dL    Calcium 8.1 (L) 8.5 - 10.5 mg/dL    BUN 37 (H) 8 - 28 mg/dL    Creatinine 1.56 (H) 0.70 - 1.30 mg/dL    GFR MDRD Af Amer 50 (L) >60 mL/min/1.73m2    GFR MDRD Non Af Amer 42 (L) >60 mL/min/1.73m2   POCT Glucose - for patients with diabetes    Collection Time: 05/05/21  6:48 AM    Specimen: Blood   Result Value Ref Range    Glucose 196 (H) 70 - 139 mg/dL   Asymptomatic SARS-CoV-2 (COVID-19)-PCR    Collection Time: 05/05/21 11:47 AM    Specimen: Respiratory   Result Value Ref Range    SARS-CoV-2 PCR Result Negative Negative, Invalid   POCT Glucose - for patients with diabetes    Collection Time: 05/05/21  1:57 PM    Specimen: Blood   Result Value Ref Range    Glucose 242 (H) 70 - 139 mg/dL   POCT Glucose - for patients with diabetes    Collection Time: 05/05/21  4:52 PM    Specimen: Blood   Result Value Ref Range    Glucose 359 (H) 70 - 139 mg/dL   POCT Glucose - for patients with diabetes    Collection Time: 05/05/21  8:19 PM    Specimen: Blood   Result Value Ref Range    Glucose 393 (H) 70 - 139 mg/dL   Potassium    Collection Time: 05/05/21 10:06 PM   Result Value Ref Range    Potassium 4.0 3.5 - 5.0 mmol/L   Magnesium    Collection Time: 05/06/21  6:07 AM   Result Value Ref Range    Magnesium 1.7 (L) 1.8 - 2.6 mg/dL   Potassium - Next AM    Collection Time: 05/06/21  6:07 AM   Result Value Ref Range    Potassium 3.6 3.5 - 5.0 mmol/L   POCT Glucose - for patients with diabetes    Collection Time: 05/06/21  6:44 AM    Specimen: Blood   Result Value Ref Range    Glucose 266 (H) 70 - 139 mg/dL   POCT Glucose    Collection Time: 05/06/21  9:33 AM    Specimen: Blood   Result Value Ref Range    Glucose 253 (H) 70 - 139  mg/dL   POCT Glucose - for patients with diabetes    Collection Time: 05/06/21  1:07 PM    Specimen: Blood   Result Value Ref Range    Glucose 213 (H) 70 - 139 mg/dL   QFT-Quantiferon TB Gold Plus    Collection Time: 05/08/21  8:48 AM   Result Value Ref Range    Quantiferon-TB Gold Plus Negative Negative    TB1 Ag minus Nil Value -0.03 IU/mL    TB2 Ag minus Nil Value -0.02 IU/mL    Mitogen minus Nil Result 2.45 IU/mL    Nil Result 0.07 IU/mL   QFT-Quantiferon TB Gold Plus Grey Tube    Collection Time: 05/08/21  8:48 AM   Result Value Ref Range    Quantiferon Nil Tube 0.07 IU/mL   QFT-Quantiferon TB Gold Plus Green Tube    Collection Time: 05/08/21  8:48 AM   Result Value Ref Range    Quantiferon TB1 Tube 0.04 IU/mL   QFT-Quantieron TB Gold Yellow Tube    Collection Time: 05/08/21  8:48 AM   Result Value Ref Range    Quantiferon TB2 Tube 0.05    QFT-Quantiferon TB Gold Purple Tube    Collection Time: 05/08/21  8:48 AM   Result Value Ref Range    Quantiferon Mitogen 2.52 IU/mL            Electronically signed by  SUSAN Oropeza

## 2021-06-21 NOTE — LETTER
Letter by Jessica Oneal MBBS at      Author: Jessica Oneal MBBS Service: -- Author Type: --    Filed:  Encounter Date: 5/7/2021 Status: (Other)         Patient: Wicho Zhao   MR Number: [id number]   YOB: 1927   Date of Visit: 5/7/2021       [facility] note      Patient: Wicho Zhao  MRN: [id number]      [facility] [[id number]]  Reason for Visit     Chief Complaint   Patient presents with   ? H & P       Code Status     DNR    Assessment     Left hip intertrochanteric fracture status post ORIF on 5/2/2021  Non-ST elevation MI cardial infarction  Acute diastolic congestive heart failure status post diuresis  Postoperative encephalopathy  Anemia requiring 2 units of packed RBC transfusion  Acute urinary retention  Moderate malnutrition  Cognitive impairment  Acute fall in the TCU  Generalized weakness    Plan     Patient admitted to the TCU for strengthening and rehab.  Continue with his incisional cares.  Weightbearing as tolerated.  For DVT prophylaxis he has been discharged on Lovenox subcu 30 mg daily  Due to blood loss anemia he did get 2 units of blood transfusion in the hospital.  Recheck hemoglobin in the TCU.  Had postoperative urinary retention however catheter was removed prior to discharge.  He is on Flomax.  Urine output to be monitored and they have recommended replacing Trujillo as needed.  He also has moderate malnutrition with poor oral intake he is encouraged to take protein supplementation daily  Started on Remeron 7.5 mg daily  Had severe postoperative delirium requiring ICU transfer.  Discharge on Seroquel.  Unfortunately extremely confused and disoriented.  Not sure what his baseline cognitive status is but currently alert and oriented only to himself.  He had an acute fall in the TCU.  No injuries have been reported.  Nursing was required to do frequent checks on him and put him on a voiding protocol.  Pain does not seem to be the issue which  caused him to fall as he denies any pain at rest.  He also developed acute diastolic CHF and has been discharged status post diuresis.  Weights to be monitored.  In addition he had recently diagnosis of NSTEMI and remains asymptomatic on medical management.  He is restarted on Plavix in 6 weeks.  CONT PT    History     Patient is a very pleasant 89 y.o. male who is admitted to TCU  Patient admitted to the hospital after a mechanical fall.  He was diagnosed with a left hip intertrochanteric fracture.  Orthopedics was consulted.  He underwent surgical repair on 5/2/2021.  Postoperatively he has been discharged to the TCU  Unfortunately he had a very  lengthy postoperative course with multiple complications.  He developed acute blood loss anemia requiring 2 units of blood transfusion.  Discharge hemoglobin did improve to 8.4.  He developed postoperative urinary retention.  He is on Flomax.  They had a Trujillo catheter in place which was removed prior to discharge.  In addition he developed postoperative encephalopathy.  He was on BiPAP and required ICU support.  While in the hospital he had delirium with recurrent agitation and was given Seroquel.  Unfortunately is quite agitated and repeatedly in the middle of the exam requesting that he be discharged home.  He is not alert and oriented to his surroundings.  He also had an acute fall in the TCU.  No injuries he is attempting self transfer      Past Medical History     Active Ambulatory (Non-Hospital) Problems    Diagnosis   ? Cognitive and behavioral changes   ? Postoperative delirium   ? Sleep difficulties   ? Metabolic encephalopathy   ? Acute respiratory failure with hypoxia (H)   ? Coronary artery disease involving native coronary artery of native heart without angina pectoris   ? Hypertension, unspecified type   ? Mitral valve stenosis, unspecified etiology   ? Dyslipidemia   ? Abnormal electrocardiogram   ? Status post aortic valve replacement   ? Closed  intertrochanteric fracture of left hip, initial encounter (H)   ? Hip fracture requiring operative repair, left, closed, initial encounter (H)   ? Closed displaced intertrochanteric fracture of left femur, initial encounter (H)   ? Chest pain   ? Prolonged Q-T interval on ECG   ? Nonrheumatic aortic valve stenosis   ? Non-STEMI (non-ST elevated myocardial infarction) (H)   ? DNAR (do not attempt resuscitation)   ? Acute non-ST elevation myocardial infarction (NSTEMI) (H)   ? Hypomagnesemia   ? Stage 3 chronic kidney disease   ? Neovascular glaucoma of left eye, severe stage   ? Primary open angle glaucoma of right eye, moderate stage   ? Mild aortic stenosis   ? Cervical kyphosis   ? Diabetic peripheral neuropathy associated with type 2 diabetes mellitus (H)   ? Glaucoma   ? Chronic anemia   ? Hyperlipidemia   ? Diabetes mellitus (H)   ? Essential hypertension     Past Medical History:   Diagnosis Date   ? Basal Cell Carcinoma Of The Skin Of The Trunk    ? Cataract 02/03/2016   ? Central retinal vein occlusion of left eye 02/03/2016   ? Chronic anemia 12/14/2014   ? Diabetes (H)    ? DNAR (do not attempt resuscitation)    ? Essential hypertension    ? Hyperlipidemia    ? Hypertension    ? Major depression in complete remission (H) 08/21/2019   ? Mild aortic stenosis 03/29/2017   ? Nuclear senile cataract of left eye 03/08/2018   ? Retinal hemorrhage, left 02/03/2016   ? Stage 3 chronic kidney disease 08/21/2019       Past Social History     Reviewed, and he  reports that he quit smoking about 42 years ago. His smoking use included cigarettes. He has a 10.00 pack-year smoking history. He has never used smokeless tobacco. He reports current alcohol use. He reports that he does not use drugs.    Family History     Reviewed, and family history includes Diabetes in his father; Hypertension in his mother; No Medical Problems in his daughter, daughter, and daughter.    Medication List   Post Discharge Medication  Reconciliation Status: discharge medications reconciled, continue medications without change   Current Outpatient Medications on File Prior to Visit   Medication Sig Dispense Refill   ? acetaminophen (TYLENOL) 325 MG tablet Take 3 tablets (975 mg total) by mouth every 8 (eight) hours.  0   ? amLODIPine (NORVASC) 5 MG tablet Take 1 tablet (5 mg total) by mouth daily.  0   ? aspirin 81 MG EC tablet Take 1 tablet (81 mg total) by mouth daily.  0   ? atorvastatin (LIPITOR) 40 MG tablet Take 40 mg by mouth at bedtime.      ? [START ON 6/17/2021] clopidogreL (PLAVIX) 75 mg tablet Take 1 tablet (75 mg total) by mouth daily. AFTER 6 WEEKS OF LOVENOX 1 tablet 0   ? enoxaparin ANTICOAGULANT (LOVENOX) 30 mg/0.3 mL syringe Inject 0.3 mL (30 mg total) under the skin daily. 35 Syringe 0   ? escitalopram oxalate (LEXAPRO) 5 MG tablet TAKE 1 TABLET BY MOUTH EVERY DAY 90 tablet 0   ? ferrous sulfate 325 (65 FE) MG tablet Take 1 tablet (325 mg total) by mouth every other day.  0   ? furosemide (LASIX) 40 MG tablet Take 1 tablet (40 mg total) by mouth daily.  0   ? gabapentin (NEURONTIN) 100 MG capsule Take 600 mg by mouth at bedtime.  0   ? glipiZIDE (GLUCOTROL) 5 MG tablet Take 5 mg by mouth daily.     ? isosorbide mononitrate (IMDUR) 30 MG 24 hr tablet Take 3 tablets (90 mg total) by mouth daily. 90 tablet 0   ? latanoprost (XALATAN) 0.005 % ophthalmic solution Administer 1 drop to both eyes bedtime.     ? magnesium oxide 250 mg magnesium Tab Take 1 tablet (250 mg total) by mouth 2 (two) times a day.  0   ? melatonin 3 mg Tab tablet Take 1 tablet (3 mg total) by mouth at bedtime.  0   ? metFORMIN (GLUCOPHAGE) 500 MG tablet TAKE 2 TABLETS BY MOUTH TWICE A DAY WITH FOOD 360 tablet 1   ? metoprolol succinate (TOPROL-XL) 50 MG 24 hr tablet Take 50 mg by mouth daily.     ? mirtazapine (REMERON) 7.5 MG tablet Take 1 tablet (7.5 mg total) by mouth at bedtime.  0   ? nitroglycerin (NITROSTAT) 0.4 MG SL tablet Take 0.4 mg by mouth every 5  (five) minutes as needed for chest pain.     ? polyethylene glycol (MIRALAX) 17 gram packet Take 1 packet (17 g total) by mouth daily as needed.  0   ? potassium chloride (KLOR-CON) 20 mEq packet Take 20 mEq by mouth daily. Crush and give with apple sauce  0   ? QUEtiapine (SEROQUEL) 25 MG tablet Take 0.5 tablets (12.5 mg total) by mouth 2 (two) times a day as needed (agitation).  0   ? QUEtiapine (SEROQUEL) 25 MG tablet Take 0.5 tablets (12.5 mg total) by mouth at bedtime for 7 days.  0   ? senna-docusate (PERICOLACE) 8.6-50 mg tablet Take 1 tablet by mouth 2 (two) times a day. 60 tablet 0   ? sodium chloride (OCEAN) 0.65 % nasal spray 1 spray into each nostril as needed for congestion.     ? tamsulosin (FLOMAX) 0.4 mg cap Take 1 capsule (0.4 mg total) by mouth daily.  0     Current Facility-Administered Medications on File Prior to Visit   Medication Dose Route Frequency Provider Last Rate Last Admin   ? [DISCONTINUED] acetaminophen tablet 650 mg (TYLENOL)  650 mg Oral Q4H PRN Renay Jones MD   650 mg at 05/06/21 0939   ? [DISCONTINUED] amLODIPine tablet 5 mg (NORVASC)  5 mg Oral DAILY Renay Jones MD   5 mg at 05/06/21 0943   ? [DISCONTINUED] atorvastatin tablet 40 mg (LIPITOR)  40 mg Oral QHS Renay Jones MD   40 mg at 05/05/21 2013   ? [DISCONTINUED] benzocaine-menthoL lozenge 1 lozenge (CEPACOL)  1 lozenge Oral Q1H PRN Renay Jones MD       ? [DISCONTINUED] bisacodyL suppository 10 mg (DULCOLAX)  10 mg Rectal Daily PRN Renay Jones MD       ? [DISCONTINUED] dextrose 50 % (D50W) syringe 20-50 mL  20-50 mL Intravenous Q15 Min PRN Renay Jones MD       ? [DISCONTINUED] docusate sodium capsule 100 mg (COLACE)  100 mg Oral BID Renay Jones MD   100 mg at 05/06/21 0943   ? [DISCONTINUED] enoxaparin ANTICOAGULANT syringe 30 mg (LOVENOX)  30 mg Subcutaneous Q24H Renay Jones MD   30 mg at 05/06/21 0524   ? [DISCONTINUED] escitalopram oxalate tablet 5 mg (LEXAPRO)  5 mg Oral DAILY Karen  MD Renay   5 mg at 05/06/21 0943   ? [DISCONTINUED] furosemide tablet 40 mg (LASIX)  40 mg Oral DAILY Jono Powell MD (Ted)   40 mg at 05/06/21 0939   ? [DISCONTINUED] gabapentin (NEURONTIN) capsule 700 mg  700 mg Oral QHS Renay Jones MD   700 mg at 05/05/21 2013   ? [DISCONTINUED] glucagon (human recombinant) injection 1 mg  1 mg Subcutaneous Q15 Min PRN Renay Jones MD       ? [DISCONTINUED] HYDROmorphone injection 0.2 mg (DILAUDID)  0.2 mg Intravenous Q4H PRN Renay Jones MD   0.2 mg at 05/03/21 1239   ? [DISCONTINUED] insulin aspart U-100 injection pen (NovoLOG)   Subcutaneous TID with meals Renay Jones MD   2 Units at 05/06/21 1316   ? [DISCONTINUED] ipratropium-albuteroL 0.5-2.5 mg/3 mL nebulizer solution 3 mL (DUO-NEB)  3 mL Nebulization QID PRN Renay Jones MD       ? [DISCONTINUED] isosorbide mononitrate 24 hr tablet 30 mg (IMDUR)  30 mg Oral DAILY Renay Jones MD   30 mg at 05/06/21 0943   ? [DISCONTINUED] latanoprost 0.005 % ophthalmic solution 1 drop (XALATAN)  1 drop Both Eyes QHS Renay Jones MD   1 drop at 05/05/21 2019   ? [DISCONTINUED] lidocaine 10 mg/mL (1 %) injection 0.1-1 mL  0.1-1 mL Other Q1H PRN Renay Jones MD       ? [DISCONTINUED] lidocaine 4 % cream (LMX)   Topical Q1H PRN Renay Jones MD       ? [DISCONTINUED] LORazepam 1 mg injection (diluted concentration)  1 mg Intravenous Once Renay Jones MD       ? [DISCONTINUED] melatonin tablet 3 mg  3 mg Oral QHS Renay Jones MD   3 mg at 05/05/21 2013   ? [DISCONTINUED] metoprolol succinate 24 hr tablet 50 mg (TOPROL-XL)  50 mg Oral DAILY Renay Jones MD   50 mg at 05/06/21 0943   ? [DISCONTINUED] naloxone injection 0.2 mg (NARCAN)  0.2 mg Intravenous Q2 Min PRN Renay Jones MD       ? [DISCONTINUED] naloxone injection 0.2 mg (NARCAN)  0.2 mg Intramuscular Q2 Min PRN Renay Jones MD       ? [DISCONTINUED] naloxone injection 0.4 mg (NARCAN)  0.4 mg Intravenous Q2 Min PRN Karen  MD Renay       ? [DISCONTINUED] naloxone injection 0.4 mg (NARCAN)  0.4 mg Intramuscular Q2 Min PRN Renay Jones MD       ? [DISCONTINUED] nitroglycerin SL tablet 0.4 mg (NITROSTAT)  0.4 mg Sublingual Q5 Min PRN Renay Jones MD       ? [DISCONTINUED] ondansetron disintegrating tablet 4 mg (ZOFRAN-ODT)  4 mg Oral Q6H PRN Renay Jones MD       ? [DISCONTINUED] ondansetron injection 4 mg (ZOFRAN)  4 mg Intravenous Q6H PRN Renay Jones MD       ? [DISCONTINUED] oxyCODONE immediate release tablet 5 mg (ROXICODONE)  5 mg Oral Q6H PRN Renay Jones MD   5 mg at 05/06/21 1552   ? [DISCONTINUED] polyethylene glycol packet 17 g (MIRALAX)  17 g Oral DAILY Renay Jones MD   17 g at 05/06/21 0943   ? [DISCONTINUED] prochlorperazine injection 5 mg (COMPAZINE)  5 mg Intravenous Q6H PRN Renay Jones MD       ? [DISCONTINUED] prochlorperazine tablet 5 mg (COMPAZINE)  5 mg Oral Q6H PRN Renay Jones MD       ? [DISCONTINUED] QUEtiapine tablet 12.5 mg (SEROquel)  12.5 mg Oral TID PRN Renay Jones MD   12.5 mg at 05/04/21 1723   ? [DISCONTINUED] QUEtiapine tablet 12.5 mg (SEROquel)  12.5 mg Oral QHS Renay Jones MD   12.5 mg at 05/05/21 2013   ? [DISCONTINUED] senna-docusate 8.6-50 mg tablet 1 tablet (PERICOLACE)  1 tablet Oral BID Renay Jones MD   1 tablet at 05/06/21 0943   ? [DISCONTINUED] sodium chloride bacteriostatic 0.9 % injection 0.1-0.3 mL  0.1-0.3 mL Subcutaneous PRN Renay Jones MD       ? [DISCONTINUED] sodium chloride flush 3 mL (NS)  3 mL Intravenous Line Care Renay Jones MD   3 mL at 05/05/21 1700   ? [DISCONTINUED] sodium chloride flush 3 mL (NS)  3 mL Intracatheter Q1 Min PRN Renay Jones MD   3 mL at 05/03/21 0942   ? [DISCONTINUED] sodium chloride flush 3 mL (NS)  3 mL Intracatheter Q8H Renay Jones MD   10 mL at 05/05/21 0853   ? [DISCONTINUED] tamsulosin 24 hr capsule 0.4 mg (FLOMAX)  0.4 mg Oral DAILY Renay Jones MD   0.4 mg at 05/06/21 0939       Allergies     No  Known Allergies    Review of Systems   A comprehensive review of 14 systems was done. Pertinent findings noted here and in history of present illness. All the rest negative.  Constitutional: Negative.  Negative for fever, chills, activity change, appetite change and fatigue.   HENT: Negative for congestion and facial swelling.    Eyes: Negative for photophobia, redness and visual disturbance.   Respiratory: Negative for cough and chest tightness.    Cardiovascular: Negative for chest pain, palpitations and leg swelling.   Gastrointestinal: Negative for nausea, diarrhea, constipation, blood in stool and abdominal distention.   Genitourinary: Negative.    Musculoskeletal: Negative.  Had an acute fall and this is suspected to be self transferring  Skin: Negative.    Neurological: Negative for dizziness, tremors, syncope, weakness, light-headedness and headaches.   Hematological: Does not bruise/bleed easily.   Psychiatric/Behavioral: Agitated repeatedly saying he wants to go home but not sure where his home is      Physical Exam     Recent Vitals 5/6/2021   Height -   Weight -   BSA (m2) -   /71   Pulse 95   Temp 97.4   Temp src 1   SpO2 91   Some recent data might be hidden       Constitutional: Oriented to person,  cachectic and appears well-developed.   HEENT:  Normocephalic and atraumatic.  Eyes: Conjunctivae and EOM are normal. Pupils are equal, round, and reactive to light. No discharge.  No scleral icterus. Nose normal. Mouth/Throat: Oropharynx is clear and moist. No oropharyngeal exudate.    NECK: Normal range of motion. Neck supple. No JVD present. No tracheal deviation present. No thyromegaly present.   CARDIOVASCULAR: Normal rate, regular rhythm and intact distal pulses.  Exam reveals no gallop and no friction rub.  Systolic murmur present.  PULMONARY: Effort normal and breath sounds normal. No respiratory distress.No Wheezing or rales.  ABDOMEN: Soft. Bowel sounds are normal. No distension and no  mass.  There is no tenderness. There is no rebound and no guarding. No HSM.  MUSCULOSKELETAL: Normal range of motion. No edema and no tenderness. Mild kyphosis, no tenderness.  L hip incision is intact  LYMPH NODES: Has no cervical, supraclavicular, axillary and groin adenopathy.   NEUROLOGICAL: Alert and oriented to persON. No cranial nerve deficit.  Normal muscle tone. Coordination normal.   GENITOURINARY: Deferred exam.  SKIN: Skin is warm and dry. No rash noted. No erythema. No pallor.   EXTREMITIES: No cyanosis, no clubbing, no edema. No Deformity.  PSYCHIATRIC: Normal mood, affect and behavior.  Very agitated repeatedly requesting that he be discharged home.  Not alert and oriented with no recall      Lab Results     Recent Results (from the past 240 hour(s))   ECG 12 lead nursing unit performed    Collection Time: 05/01/21  2:07 PM   Result Value Ref Range    SYSTOLIC BLOOD PRESSURE 126 mmHg    DIASTOLIC BLOOD PRESSURE 61 mmHg    VENTRICULAR RATE 79 BPM    ATRIAL RATE 79 BPM    P-R INTERVAL 160 ms    QRS DURATION 104 ms    Q-T INTERVAL 440 ms    QTC CALCULATION (BEZET) 504 ms    P Axis 113 degrees    R AXIS 60 degrees    T AXIS 76 degrees    MUSE DIAGNOSIS       Sinus rhythm with occasional Premature ventricular complexes  Cannot rule out Inferior infarct (cited on or before 27-APR-2021)  Prolonged QT  Abnormal ECG  When compared with ECG of 27-APR-2021 07:32,  Premature ventricular complexes are now Present  Confirmed by SEE ED PROVIDER NOTE FOR, ECG INTERPRETATION ([id number]),  GEOFFREY ABDALLA ([id number]) on 5/1/2021 4:54:25 PM     Basic Metabolic Panel    Collection Time: 05/01/21  2:19 PM   Result Value Ref Range    Sodium 140 136 - 145 mmol/L    Potassium 4.7 3.5 - 5.0 mmol/L    Chloride 108 (H) 98 - 107 mmol/L    CO2 20 (L) 22 - 31 mmol/L    Anion Gap, Calculation 12 5 - 18 mmol/L    Glucose 128 (H) 70 - 125 mg/dL    Calcium 8.7 8.5 - 10.5 mg/dL    BUN 29 (H) 8 - 28 mg/dL    Creatinine 1.71 (H) 0.70 -  1.30 mg/dL    GFR MDRD Af Amer 45 (L) >60 mL/min/1.73m2    GFR MDRD Non Af Amer 37 (L) >60 mL/min/1.73m2   Magnesium    Collection Time: 05/01/21  2:19 PM   Result Value Ref Range    Magnesium 1.5 (L) 1.8 - 2.6 mg/dL   INR    Collection Time: 05/01/21  2:19 PM   Result Value Ref Range    INR 1.16 (H) 0.90 - 1.10   Troponin I    Collection Time: 05/01/21  2:19 PM   Result Value Ref Range    Troponin I 0.03 0.00 - 0.29 ng/mL   Asymptomatic SARS-CoV-2 (COVID-19)-PCR    Collection Time: 05/01/21  2:19 PM    Specimen: Respiratory   Result Value Ref Range    SARS-CoV-2 PCR Result Negative Negative, Invalid   HM1 (CBC with Diff)    Collection Time: 05/01/21  2:20 PM   Result Value Ref Range    WBC 9.2 4.0 - 11.0 thou/uL    RBC 2.79 (L) 4.40 - 6.20 mill/uL    Hemoglobin 8.6 (L) 14.0 - 18.0 g/dL    Hematocrit 27.1 (L) 40.0 - 54.0 %    MCV 97 80 - 100 fL    MCH 30.8 27.0 - 34.0 pg    MCHC 31.7 (L) 32.0 - 36.0 g/dL    RDW 14.9 (H) 11.0 - 14.5 %    Platelets 229 140 - 440 thou/uL    MPV 9.7 8.5 - 12.5 fL    Neutrophils % 83 (H) 50 - 70 %    Lymphocytes % 10 (L) 20 - 40 %    Monocytes % 7 2 - 10 %    Eosinophils % 1 0 - 6 %    Basophils % 0 0 - 2 %    Immature Granulocyte % 0 <=0 %    Neutrophils Absolute 7.6 2.0 - 7.7 thou/uL    Lymphocytes Absolute 0.9 0.8 - 4.4 thou/uL    Monocytes Absolute 0.6 0.0 - 0.9 thou/uL    Eosinophils Absolute 0.1 0.0 - 0.4 thou/uL    Basophils Absolute 0.0 0.0 - 0.2 thou/uL    Immature Granulocyte Absolute 0.0 <=0.0 thou/uL   Urinalysis-UC if Indicated    Collection Time: 05/01/21  3:57 PM   Result Value Ref Range    Color, UA Light Yellow Light Yellow, Yellow    Clarity, UA Clear Clear    Glucose, UA Negative Negative    Protein, UA 30 mg/dL (!) Negative    Bilirubin, UA Negative Negative    Urobilinogen, UA <2.0 mg/dL <2.0 mg/dL    pH, UA 5.5 5.0 - 8.0    Blood, UA 0.1 mg/dL (!) Negative    Ketones, UA Negative Negative    Nitrite, UA Negative Negative    Leukocytes, UA Negative Negative     Specific Gravity, UA 1.021 1.001 - 1.030    RBC, UA 20 (H) <=2 hpf    WBC UA 3 <=5 hpf    Bacteria, UA Few (!) None Seen    Squamous Epithel, UA 0 <=5 /HPF    Mucus, UA Present (!) None Seen lpf   POCT Glucose    Collection Time: 05/01/21  6:00 PM    Specimen: Blood   Result Value Ref Range    Glucose 175 (H) 70 - 139 mg/dL   POCT Glucose    Collection Time: 05/01/21  9:30 PM    Specimen: Blood   Result Value Ref Range    Glucose 165 (H) 70 - 139 mg/dL   POCT Glucose    Collection Time: 05/02/21  6:14 AM    Specimen: Blood   Result Value Ref Range    Glucose 137 70 - 139 mg/dL   Basic Metabolic Panel    Collection Time: 05/02/21  6:21 AM   Result Value Ref Range    Sodium 142 136 - 145 mmol/L    Potassium 4.0 3.5 - 5.0 mmol/L    Chloride 106 98 - 107 mmol/L    CO2 25 22 - 31 mmol/L    Anion Gap, Calculation 11 5 - 18 mmol/L    Glucose 152 (H) 70 - 125 mg/dL    Calcium 8.7 8.5 - 10.5 mg/dL    BUN 28 8 - 28 mg/dL    Creatinine 1.48 (H) 0.70 - 1.30 mg/dL    GFR MDRD Af Amer 54 (L) >60 mL/min/1.73m2    GFR MDRD Non Af Amer 44 (L) >60 mL/min/1.73m2   Troponin I    Collection Time: 05/02/21  6:21 AM   Result Value Ref Range    Troponin I 0.02 0.00 - 0.29 ng/mL   BNP(B-type Natriuretic Peptide)    Collection Time: 05/02/21  6:21 AM   Result Value Ref Range    BNP 1,047 (H) 0 - 93 pg/mL   HM1 (CBC with Diff)    Collection Time: 05/02/21  6:21 AM   Result Value Ref Range    WBC 9.9 4.0 - 11.0 thou/uL    RBC 2.81 (L) 4.40 - 6.20 mill/uL    Hemoglobin 8.4 (L) 14.0 - 18.0 g/dL    Hematocrit 27.0 (L) 40.0 - 54.0 %    MCV 96 80 - 100 fL    MCH 29.9 27.0 - 34.0 pg    MCHC 31.1 (L) 32.0 - 36.0 g/dL    RDW 15.0 (H) 11.0 - 14.5 %    Platelets 229 140 - 440 thou/uL    MPV 9.5 8.5 - 12.5 fL    Neutrophils % 75 (H) 50 - 70 %    Lymphocytes % 14 (L) 20 - 40 %    Monocytes % 10 2 - 10 %    Eosinophils % 1 0 - 6 %    Basophils % 0 0 - 2 %    Immature Granulocyte % 0 <=0 %    Neutrophils Absolute 7.4 2.0 - 7.7 thou/uL    Lymphocytes  Absolute 1.4 0.8 - 4.4 thou/uL    Monocytes Absolute 1.0 (H) 0.0 - 0.9 thou/uL    Eosinophils Absolute 0.1 0.0 - 0.4 thou/uL    Basophils Absolute 0.0 0.0 - 0.2 thou/uL    Immature Granulocyte Absolute 0.0 <=0.0 thou/uL   Magnesium    Collection Time: 05/02/21  6:21 AM   Result Value Ref Range    Magnesium 1.5 (L) 1.8 - 2.6 mg/dL   ECG 12 lead MUSE    Collection Time: 05/02/21  6:25 AM   Result Value Ref Range    SYSTOLIC BLOOD PRESSURE      DIASTOLIC BLOOD PRESSURE      VENTRICULAR RATE 80 BPM    ATRIAL RATE 51 BPM    P-R INTERVAL 142 ms    QRS DURATION 108 ms    Q-T INTERVAL 426 ms    QTC CALCULATION (BEZET) 491 ms    P Axis      R AXIS 56 degrees    T AXIS 84 degrees    MUSE DIAGNOSIS       Sinus bradycardia with sinus arrhythmia with frequent Premature ventricular complexes  Marked ST abnormality, possible anterior subendocardial injury  Prolonged QT  Abnormal ECG  When compared with ECG of 01-MAY-2021 14:07,  No significant change was found  Confirmed by GILMAR RIVERA MD LOC:[facility] ([id number]) on 5/3/2021 2:59:40 PM     Echo Complete    Collection Time: 05/02/21  8:46 AM   Result Value Ref Range    LV volume diastolic 97 62.0 - 150.0 cm3    LV volume systolic 43.4 21.0 - 61.0 cm3    HR 85 bpm    IVSd 0.841 0.6 - 1.0 cm    LVIDd 4.85 4.2 - 5.8 cm    LVIDs 3.35 2.5 - 4.0 cm    LVOT diam 1.6 cm    LVOT mean gradient 1 mmHg    LVOT peak VTI 17.1 cm    LVOT mean kyle 50.8 cm/s    LVOT peak kyle 75.1 cm/s    LVOT peak gradient 2 mmHg    LV PWd 1.09 (!) 0.6 - 1.0 cm    MV E' lat kyle 7.74 cm/s    MV E' med kyle 3.58 cm/s    AV mean kyle 172 cm/s    AV mean gradient 14 mmHg    AV VTI 55.5 cm    AV peak kyle 280 cm/s    AO root 3.4 cm    AO ascending 3.3 cm    LA size 4.3 cm    LA/AO root ratio 1.26 no units    MV decel slope 8,500 mm/s2    MV decel time 194 ms    MV P 1/2 time 57 ms    MV peak A kyle 124 cm/s    MV peak E kyle 165 cm/s    MV mean kyle 120 cm/s    MV mean gradient 7 mmHg    MV VTI 38.4 cm    MV peak Velocity 196 cm/s     PV accel time 134 ms    TR peak kyle 311 cm/s    BSA 1.85 m2    Hieght 73 in    Weight 2,350.4 lbs    /69 mmHg    IVS/PW ratio 0.8     TR peak gradent 38.7 mmHg    LV FS 30.9 28.0 - 44.0 %    Echo LVEF calculated 55 55 - 75 %    LA volume 89.6 mL    LV mass 165.1 g    AV area 0.6 cm2    AV DIM IND kyle 0.3     MV area p 1/2 time 3.9 cm2    MV area cont eq 0.9 cm2    MV E/A Ratio 1.3     LVOT area 2.01 cm2    LVOT SV 34.4 cm3    AV peak gradient 31.4 mmHg    MV peak gradient 15.4 mmHg    LV systolic volume index 23.5 11.0 - 31.0 cm3/m2    LV diastolic volume index 52.4 34.0 - 74.0 cm3/m2    LA volume index 48.4 mL/m2    LV mass index 89.2 g/m2    LV SVi 18.6 ml/m2    TAPSE 2.6 cm    MV med E/e' ratio 46.1     MV lat E/e' ratio 21.3     LV CO 2.9 l/min    LV Ci 1.6 l/min/m2    LA area 2 21.3 cm2    LA area 1 28.2 cm2    Height 73.0 in    Weight 147 lbs    MV Avg E/e' Ratio 29.2 cm/s    LA length 5.7 cm    AV DIM IND VTI 0.3     MVA VTI 0.89 cm2   POCT Glucose    Collection Time: 05/02/21 11:20 AM    Specimen: Blood   Result Value Ref Range    Glucose 172 (H) 70 - 139 mg/dL   POCT Glucose - for patients with diabetes    Collection Time: 05/02/21  4:48 PM    Specimen: Blood   Result Value Ref Range    Glucose 185 (H) 70 - 139 mg/dL   Creatinine    Collection Time: 05/02/21  6:03 PM   Result Value Ref Range    Creatinine 1.57 (H) 0.70 - 1.30 mg/dL    GFR MDRD Af Amer 50 (L) >60 mL/min/1.73m2    GFR MDRD Non Af Amer 41 (L) >60 mL/min/1.73m2   BNP(B-type Natriuretic Peptide)    Collection Time: 05/02/21  6:03 PM   Result Value Ref Range    BNP 1,466 (H) 0 - 93 pg/mL   HM2(CBC w/o Differential)    Collection Time: 05/02/21  6:03 PM   Result Value Ref Range    WBC 9.6 4.0 - 11.0 thou/uL    RBC 2.49 (L) 4.40 - 6.20 mill/uL    Hemoglobin 7.6 (L) 14.0 - 18.0 g/dL    Hematocrit 24.3 (L) 40.0 - 54.0 %    MCV 98 80 - 100 fL    MCH 30.5 27.0 - 34.0 pg    MCHC 31.3 (L) 32.0 - 36.0 g/dL    RDW 15.0 (H) 11.0 - 14.5 %     Platelets 224 140 - 440 thou/uL    MPV 9.9 8.5 - 12.5 fL   Lactic Acid    Collection Time: 05/02/21  6:03 PM   Result Value Ref Range    Lactic Acid 1.6 0.7 - 2.0 mmol/L   Basic Metabolic Panel    Collection Time: 05/02/21  6:03 PM   Result Value Ref Range    Sodium 137 136 - 145 mmol/L    Potassium 4.4 3.5 - 5.0 mmol/L    Chloride 103 98 - 107 mmol/L    CO2 23 22 - 31 mmol/L    Anion Gap, Calculation 11 5 - 18 mmol/L    Glucose 224 (H) 70 - 125 mg/dL    Calcium 8.5 8.5 - 10.5 mg/dL    BUN 29 (H) 8 - 28 mg/dL    Creatinine 1.56 (H) 0.70 - 1.30 mg/dL    GFR MDRD Af Amer 50 (L) >60 mL/min/1.73m2    GFR MDRD Non Af Amer 42 (L) >60 mL/min/1.73m2   Magnesium    Collection Time: 05/02/21  6:03 PM   Result Value Ref Range    Magnesium 2.2 1.8 - 2.6 mg/dL   POCT Glucose - for patients with diabetes    Collection Time: 05/02/21  7:14 PM    Specimen: Blood   Result Value Ref Range    Glucose 213 (H) 70 - 139 mg/dL   Blood Gases, Arterial    Collection Time: 05/02/21  7:35 PM   Result Value Ref Range    pH, Arterial 7.35 (L) 7.37 - 7.44    pCO2, Arterial 43 35 - 45 mm Hg    pO2, Arterial 106 (H) 75 - 85 mm Hg    Bicarbonate, Arterial Calc 22.5 (L) 23.0 - 29.0 mmol/L    O2 Sat, Arterial 98.4 (H) 95.0 - 96.0 %    Oxyhemoglobin 96.4 (H) 95.0 - 96.0 %    Base Excess, Arterial Calc -2.4 mmol/L    Ventilation Mode Bi-PAP     Rate 12 rr/min    FIO2 0.60     Peep 6 cm H2O    Sample Stabilized Temperature 37.0 degrees C   Type and Screen    Collection Time: 05/02/21  7:47 PM   Result Value Ref Range    ABORh A NEG     Antibody Screen Negative Negative   POCT Glucose    Collection Time: 05/02/21 10:01 PM    Specimen: Blood   Result Value Ref Range    Glucose 227 (H) 70 - 139 mg/dL   Magnesium    Collection Time: 05/03/21  5:09 AM   Result Value Ref Range    Magnesium 2.0 1.8 - 2.6 mg/dL   HM2(CBC w/o Differential)    Collection Time: 05/03/21  5:09 AM   Result Value Ref Range    WBC 9.9 4.0 - 11.0 thou/uL    RBC 3.19 (L) 4.40 - 6.20  mill/uL    Hemoglobin 9.5 (L) 14.0 - 18.0 g/dL    Hematocrit 29.1 (L) 40.0 - 54.0 %    MCV 91 80 - 100 fL    MCH 29.8 27.0 - 34.0 pg    MCHC 32.6 32.0 - 36.0 g/dL    RDW 15.8 (H) 11.0 - 14.5 %    Platelets 203 140 - 440 thou/uL    MPV 10.1 8.5 - 12.5 fL   Basic Metabolic Panel    Collection Time: 05/03/21  5:09 AM   Result Value Ref Range    Sodium 139 136 - 145 mmol/L    Potassium 4.2 3.5 - 5.0 mmol/L    Chloride 103 98 - 107 mmol/L    CO2 24 22 - 31 mmol/L    Anion Gap, Calculation 12 5 - 18 mmol/L    Glucose 237 (H) 70 - 125 mg/dL    Calcium 8.1 (L) 8.5 - 10.5 mg/dL    BUN 33 (H) 8 - 28 mg/dL    Creatinine 1.53 (H) 0.70 - 1.30 mg/dL    GFR MDRD Af Amer 52 (L) >60 mL/min/1.73m2    GFR MDRD Non Af Amer 43 (L) >60 mL/min/1.73m2   POCT Glucose - for patients with diabetes    Collection Time: 05/03/21  6:28 AM    Specimen: Blood   Result Value Ref Range    Glucose 220 (H) 70 - 139 mg/dL   POCT Glucose - for patients with diabetes    Collection Time: 05/03/21 11:50 AM    Specimen: Blood   Result Value Ref Range    Glucose 263 (H) 70 - 139 mg/dL   POCT Glucose - for patients with diabetes    Collection Time: 05/03/21  4:58 PM    Specimen: Blood   Result Value Ref Range    Glucose 266 (H) 70 - 139 mg/dL   POCT Glucose - for patients with diabetes    Collection Time: 05/03/21  9:10 PM    Specimen: Blood   Result Value Ref Range    Glucose 290 (H) 70 - 139 mg/dL   Crossmatch    Collection Time: 05/04/21 12:06 AM   Result Value Ref Range    Crossmatch Compatible     Unit Type A Neg     Unit Number [id number]     Status Transfused     Component Red Blood Cells     PRODUCT CODE [id number]     Issue Date and Time [id number]     Blood Type 0600     CODING SYSTEM [id number]    Crossmatch    Collection Time: 05/04/21 12:06 AM   Result Value Ref Range    Crossmatch Compatible     Unit Type A Neg     Unit Number [id number]     Status Transfused     Component Red Blood Cells     PRODUCT CODE [id number]     Issue Date and Time  [id number]     Blood Type 0600     CODING SYSTEM [id number]    Potassium    Collection Time: 05/04/21  5:58 AM   Result Value Ref Range    Potassium 3.4 (L) 3.5 - 5.0 mmol/L   Hemoglobin POD1 and POD2    Collection Time: 05/04/21  5:58 AM   Result Value Ref Range    Hemoglobin 8.4 (L) 14.0 - 18.0 g/dL   Magnesium    Collection Time: 05/04/21  5:58 AM   Result Value Ref Range    Magnesium 2.0 1.8 - 2.6 mg/dL   POCT Glucose - for patients with diabetes    Collection Time: 05/04/21  6:09 AM    Specimen: Blood   Result Value Ref Range    Glucose 192 (H) 70 - 139 mg/dL   POCT Glucose - for patients with diabetes    Collection Time: 05/04/21 11:41 AM    Specimen: Blood   Result Value Ref Range    Glucose 217 (H) 70 - 139 mg/dL   Potassium    Collection Time: 05/04/21  2:05 PM   Result Value Ref Range    Potassium 3.9 3.5 - 5.0 mmol/L   POCT Glucose - for patients with diabetes    Collection Time: 05/04/21  5:17 PM    Specimen: Blood   Result Value Ref Range    Glucose 286 (H) 70 - 139 mg/dL   POCT Glucose - for patients with diabetes    Collection Time: 05/04/21  8:55 PM    Specimen: Blood   Result Value Ref Range    Glucose 283 (H) 70 - 139 mg/dL   Magnesium    Collection Time: 05/05/21  6:06 AM   Result Value Ref Range    Magnesium 1.8 1.8 - 2.6 mg/dL   Basic Metabolic Panel    Collection Time: 05/05/21  6:06 AM   Result Value Ref Range    Sodium 140 136 - 145 mmol/L    Potassium 3.1 (L) 3.5 - 5.0 mmol/L    Chloride 102 98 - 107 mmol/L    CO2 27 22 - 31 mmol/L    Anion Gap, Calculation 11 5 - 18 mmol/L    Glucose 215 (H) 70 - 125 mg/dL    Calcium 8.1 (L) 8.5 - 10.5 mg/dL    BUN 37 (H) 8 - 28 mg/dL    Creatinine 1.56 (H) 0.70 - 1.30 mg/dL    GFR MDRD Af Amer 50 (L) >60 mL/min/1.73m2    GFR MDRD Non Af Amer 42 (L) >60 mL/min/1.73m2   POCT Glucose - for patients with diabetes    Collection Time: 05/05/21  6:48 AM    Specimen: Blood   Result Value Ref Range    Glucose 196 (H) 70 - 139 mg/dL   Asymptomatic SARS-CoV-2  (COVID-19)-PCR    Collection Time: 05/05/21 11:47 AM    Specimen: Respiratory   Result Value Ref Range    SARS-CoV-2 PCR Result Negative Negative, Invalid   POCT Glucose - for patients with diabetes    Collection Time: 05/05/21  1:57 PM    Specimen: Blood   Result Value Ref Range    Glucose 242 (H) 70 - 139 mg/dL   POCT Glucose - for patients with diabetes    Collection Time: 05/05/21  4:52 PM    Specimen: Blood   Result Value Ref Range    Glucose 359 (H) 70 - 139 mg/dL   POCT Glucose - for patients with diabetes    Collection Time: 05/05/21  8:19 PM    Specimen: Blood   Result Value Ref Range    Glucose 393 (H) 70 - 139 mg/dL   Potassium    Collection Time: 05/05/21 10:06 PM   Result Value Ref Range    Potassium 4.0 3.5 - 5.0 mmol/L   Magnesium    Collection Time: 05/06/21  6:07 AM   Result Value Ref Range    Magnesium 1.7 (L) 1.8 - 2.6 mg/dL   Potassium - Next AM    Collection Time: 05/06/21  6:07 AM   Result Value Ref Range    Potassium 3.6 3.5 - 5.0 mmol/L   POCT Glucose - for patients with diabetes    Collection Time: 05/06/21  6:44 AM    Specimen: Blood   Result Value Ref Range    Glucose 266 (H) 70 - 139 mg/dL   POCT Glucose    Collection Time: 05/06/21  9:33 AM    Specimen: Blood   Result Value Ref Range    Glucose 253 (H) 70 - 139 mg/dL   POCT Glucose - for patients with diabetes    Collection Time: 05/06/21  1:07 PM    Specimen: Blood   Result Value Ref Range    Glucose 213 (H) 70 - 139 mg/dL            Imaging Results     Echo Complete    Result Date: 5/2/2021  1. Normal left ventricular size and systolic performance with a visually estimated ejection fraction of 55%.  There is basal inferior akinesis. 2. There is moderate aortic stenosis. 3. There is trace aortic insufficiency. 4. There is moderate calcific mitral stenosis. 5. There is mild to moderate mitral insufficiency. 6. Normal right ventricular size and systolic performance. 7. There is moderate left atrial enlargement. 8. Right ventricular  systolic pressure relative to right atrial pressure is mildly increased.  The pulmonary artery pressure is estimated to be 35-40 mmHg plus right atrial pressure (the IVC is of fairly normal caliber). When compared to the prior real-time echocardiogram dated 29 November 2020, the findings are felt to be fairly similar on both examinations.  Of note, although not commented upon in the prior report; the the basal inferior wall motion abnormality was felt to be present on the prior examination as well.    Xr Chest 1 View Portable    Result Date: 5/2/2021  EXAM: XR CHEST 1 VIEW PORTABLE LOCATION: [facility] DATE/TIME: 5/2/2021 7:01 PM INDICATION: sob COMPARISON: 5/1/2021     Patient is mildly rotated with ectatic calcified thoracic aorta in retrocardiac region. There is also likely a hiatal hernia in the retrocardiac region. Heart size normal. Increase opacity left lung base suggests atelectasis or infiltrate, in addition to the ectatic aorta and hiatal hernia. Left upper lobe and right lung are clear.    Xr Chest 1 View Portable    Result Date: 4/26/2021  EXAM: XR CHEST 1 VIEW PORTABLE LOCATION: [facility] DATE/TIME: 4/26/2021 6:18 AM INDICATION: Chest pain. COMPARISON: 2/26/2021. FINDINGS: The heart is enlarged. There are increased interstitial markings at the lung bases suggesting mild interstitial edema. The lungs are otherwise clear. Thoracic aorta is calcified and tortuous. The lungs are otherwise clear. Large hiatal hernia. No pneumothorax is evident though the patient's chin obscures the lung apices.     Mild interstitial edema.         Ct Head Without Contrast    Result Date: 5/1/2021  EXAM: CT HEAD WO CONTRAST LOCATION: [facility] DATE/TIME: 5/1/2021 2:46 PM INDICATION: Fall, head trauma, pain COMPARISON: 11/28/2020 head CT TECHNIQUE: Routine CT Head without IV contrast. Multiplanar reformats. Dose reduction techniques were used.  FINDINGS: INTRACRANIAL CONTENTS: No intracranial hemorrhage, extraaxial collection, or mass effect.  No CT evidence of acute infarct. Tiny chronic infarct within the right cerebellum, and chronic bilateral basal ganglia and right thalamic lacunar infarcts. Severe presumed chronic small vessel ischemic changes. Mild to moderate generalized volume loss. No hydrocephalus. VISUALIZED ORBITS/SINUSES/MASTOIDS: Prior right cataract surgery. Visualized portions of the orbits are otherwise unremarkable. No paranasal sinus mucosal disease. No middle ear or mastoid effusion. BONES/SOFT TISSUES: No acute abnormality.     1.  No CT evidence for acute intracranial process. 2.  Brain atrophy and presumed chronic microvascular ischemic changes as above. 3.  Multiple chronic lacunar infarcts.    Xr Chest 1 View    Result Date: 5/1/2021  EXAM: XR CHEST 1 VIEW LOCATION: [facility] DATE/TIME: 5/1/2021 3:02 PM INDICATION: Preop evaluation for proximal left femur fracture. COMPARISON: 4/26/2021     Stable cardiomegaly with normal vascularity. Large retrocardiac hiatal hernia. No focal consolidation, pneumothorax nor pleural effusion.    Xr Femur Left 2 Vws Portable    Result Date: 5/1/2021  EXAM: XR FEMUR LEFT 2 VWS PORTABLE LOCATION: [facility] DATE/TIME: 5/1/2021 8:54 PM INDICATION: 2v of full femur for surgical planning COMPARISON: 5/1/2021 at 1450     Again noted is the left proximal femoral fracture with varus angulation. No visualized distal femoral fracture. Vascular calcifications.    Xr C Arm Greater Than One Hour    Result Date: 5/2/2021  Please see Operative or Procedure Note for details on this exam or Radiology Report for body part of interest (if applicable).     Xr Pelvis W 2 Vw Hip Left    Result Date: 5/1/2021  EXAM: XR PELVIS WITH 2 VIEW HIP LEFT LOCATION: [facility] DATE/TIME: 05/01/2021, 3:02 PM INDICATION: Fall, pain. COMPARISON: None  FINDINGS: Lumbar spine degenerative change and mild scoliosis.     Comminuted intertrochanteric left hip fracture with angulation and displacement.           Electronically signed by  SUSAN Oropeza

## 2021-06-21 NOTE — PROGRESS NOTES
Assessment:  1.  Non-insulin-dependent diabetes mellitus, checking status.  2.  Hypertension, appearing not controlled but he did not take any of his blood pressure medication this morning because he was fasting.  3.  Hyperlipidemia, checking status.  9 #4.  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  5.  Significant situational stress with his wife and her dementia but he has help from family.    Plan: Check fasting A1c, lipid hepatic and basic profiles.  Did renew his escitalopram, gabapentin, glipizide/metformin, and hydrochlorothiazide.  I did explain though that he would need to get the refill for Latona post from his eye physician who is Dr. Garcia.  Get blood pressure recheck in 1-2 weeks here in the office when he has had his medicine in the morning and if that blood pressure is okay then okay to follow-up and see me in 3 months i.e. mid to late January.  He understands and agrees.  Continue testing blood sugar once a day and did refills for #100 refillable x3.    Subjective: 89-year-old male presenting for follow-up on the above.  Regarding diabetes he does check blood sugars daily, this morning was 106.  He is careful with diet.  He is taking medication as listed.  Regarding hypertension no headaches or dizziness or leg swelling.  He did not take any of his blood pressure medicines this morning because of fasting and I explained that he can take those on the days he comes fasting but to not take the glipizide/metformin when he is fasting for the blood work.  Regarding lipids no diarrhea constipation muscle aching or muscle weakness.  PHQ-9 score today is 4.  He jokingly asks today if there is a pill for the stress for her caregiver.  His family helps as they can with his wife's dementia and his daughter is watching his wife today while he came for his appointment.  Past Medical History:   Diagnosis Date     Depression      Diabetes (H)      Diabetes (H)      Hyperlipidemia      Hypertension      No Known  Allergies  Current Outpatient Prescriptions   Medication Sig Dispense Refill     aspirin 81 MG EC tablet Take 81 mg by mouth bedtime.       blood glucose test (GLUCOSE BLOOD) strips Use 1 each As Directed daily before breakfast. Dispense brand per patient's insurance at pharmacy discretion. 100 strip 3     brimonidine (ALPHAGAN P) 0.1 % Drop 1 drop 3 times daily       escitalopram oxalate (LEXAPRO) 5 MG tablet Take 1 tablet (5 mg total) by mouth daily. 90 tablet 1     furosemide (LASIX) 20 MG tablet Take 1 tablet (20 mg total) by mouth daily. 90 tablet 3     gabapentin (NEURONTIN) 600 MG tablet Take 1 tablet (600 mg total) by mouth at bedtime. 90 tablet 1     glipiZIDE-metFORMIN (METAGLIP) 5-500 mg per tablet Take 2 tablets by mouth 2 (two) times a day. 360 tablet 1     hydroCHLOROthiazide (MICROZIDE) 12.5 mg capsule Take 1 capsule (12.5 mg total) by mouth daily. 90 capsule 1     latanoprost (XALATAN) 0.005 % ophthalmic solution Administer 1 drop to both eyes bedtime.       lisinopril (PRINIVIL,ZESTRIL) 20 MG tablet TAKE ONE TABLET BY MOUTH ONE TIME DAILY 90 tablet 3     lovastatin (MEVACOR) 20 MG tablet Take 1 tablet (20 mg total) by mouth every evening. 90 tablet 3     metoprolol succinate (TOPROL-XL) 25 MG TAKE 1 TABLET (25 MG TOTAL) BY MOUTH DAILY. 90 tablet 2     potassium chloride (KLOR-CON M20) 20 MEQ tablet Take 1 tablet (20 mEq total) by mouth daily. 90 tablet 2     sodium chloride (OCEAN) 0.65 % nasal spray 1 spray into each nostril as needed for congestion.       timolol (BETIMOL) 0.25 % ophthalmic solution 1-2 drops 2 (two) times a day.       verapamil (VERELAN PM) 240 MG 24 hr capsule TAKE ONE CAPSULE BY MOUTH ONE TIME DAILY 90 capsule 2     No current facility-administered medications for this visit.      All other review of systems are negative.    Objective:/80 (Patient Site: Right Arm, Patient Position: Sitting, Cuff Size: Adult Large)  Pulse 64  Wt 147 lb (66.7 kg)  BMI 20.79 kg/m2  HEENT  examination shows no acute change.  Neck supple without adenopathy or thyromegaly.  Lungs clear.  Heart regular rate and rhythm with soft grade 2/6 systolic murmur that is crescendo decrescendo consistent with his known mild aortic stenosis.  Abdomen shows no masses tenderness or hepatosplenomegaly.  No pedal edema.  He is alert with clear speech.  He does have the thoracic kyphosis.

## 2021-06-21 NOTE — LETTER
Letter by Jessica Oneal MBBS at      Author: Jessica Oneal MBBS Service: -- Author Type: --    Filed:  Encounter Date: 5/20/2021 Status: (Other)         Patient: Wicho Zhao   MR Number: [id number]   YOB: 1927   Date of Visit: 5/20/2021       [facility] note      Patient: Wicho Zhao  MRN: [id number]      [facility] [[id number]]  Reason for Visit     Chief Complaint   Patient presents with   ? Review Of Multiple Medical Conditions   follow-up dementia/pain/dm    Code Status     DNR    Assessment     Hypotension with low bp noted  Wt loss >7lb in tcu  DM with sub optimal control and elevated BG  Left hip intertrochanteric fracture status post ORIF on 5/2/2021  Non-ST elevation MI cardial infarction  Acute diastolic congestive heart failure status post diuresis  Postoperative encephalopathy  Anemia requiring 2 units of packed RBC transfusion  Acute urinary retention  Moderate malnutrition  Cognitive impairment  Acute fall in the TCU  Generalized weakness    Plan     Patient admitted to the TCU for strengthening and rehab.  Seen today for multiple concerns including weight loss hypotension, suboptimal blood sugar control as well as dementia  Continue with his incisional cares.  Weightbearing as tolerated.  He has had a scheduled follow-up with orthopedics.  As per them he is doing well incision is healing well and he will continue weightbearing as tolerated  lovenox for 6 weeks for DVT prophylaxis per Ortho  Pain is adequately controlled on his current regimen and patient denies any pain  Noted to have frequent bouts of low blood pressures.  Patient unfortunately has dementia and is a poor historian  Amlodipine hold parameters given  Also on Lasix 40 mg with no evidence of lymphedema   he appears to be quite euvolemic.  Weight loss of 7 pounds has been noted in the TCU.  Suspect there may be lack of compliance of Lasix at home.  Discontinue Lasix 40 mg  Lasix 20mg  p.o. daily  Recheck BMP reviewed and his creatinine is stable at 1.4.  Continue to monitor weights and kidney functions closely electrolytes are stable  Ensure/boost supplementation to be added due to a 7 pound weight loss.  I did talk to the dietitian   he is eating 50% -100% of the offered meal  Staff is reporting that he is frequently having diarrhea.  We will change his schedules senna to senna prn  R/c labs  Cognitively remains impaired with a slums of 13/30.  Mood and behaviors do have improved he does have Seroquel available as needed  Recheck anemia is resolving nicely with a hemoglobin of 10.4 now.  His blood sugar checks have been increased to twice daily because of suboptimal control again   not a candidate for tight control  We will adjust his medications only blood sugars are persistently high    History     Patient is a very pleasant 89 y.o. male who is admitted to TCU  Patient admitted to the hospital after a mechanical fall.  He was diagnosed with a left hip intertrochanteric fracture.  Orthopedics was consulted.  He underwent surgical repair on 5/2/2021.  Postoperatively he has been discharged to the TCU  Apparently incision is intact   Staples have been removed and his incision is left open to air it appears to be healing well.  He has a scheduled follow-up with orthopedics and was told to continue Lovenox for a total duration of 6 weeks  Unfortunately he had a very  lengthy postoperative course with multiple complications.  He developed acute blood loss anemia requiring 2 units of blood transfusion.  Discharge hemoglobin did improve to 8.4.  Recheck hemoglobin done in the TCU is improved to 10.4  He developed postoperative urinary retention.  He is on Flomax.  They had a Trujillo catheter in place which was removed prior to discharge.  In addition he developed postoperative encephalopathy.  He was on BiPAP and required ICU support.  While in the hospital he had delirium with recurrent agitation and  was given Seroquel.  Family has been calling concerned about the fact that his behaviors are still not back to baseline   they have been requesting Ativan  Patient has been frequently yelling for help rather than requesting and communicating his needs  He is not alert and oriented to his surroundings.  Cognitively he is testing impaired slums is 13/30  He also had an acute fall in the TCU.  No injuries he is attempting self transfer  Participation in therapy is limited  Blood pressures are frequently noted to be on the low side.  He has lost 7 pounds in the TCU    Past Medical History     Active Ambulatory (Non-Hospital) Problems    Diagnosis   ? Cognitive and behavioral changes   ? Postoperative delirium   ? Sleep difficulties   ? Metabolic encephalopathy   ? Acute respiratory failure with hypoxia (H)   ? Coronary artery disease involving native coronary artery of native heart without angina pectoris   ? Hypertension, unspecified type   ? Mitral valve stenosis, unspecified etiology   ? Dyslipidemia   ? Abnormal electrocardiogram   ? Status post aortic valve replacement   ? Closed intertrochanteric fracture of left hip, initial encounter (H)   ? Hip fracture requiring operative repair, left, closed, initial encounter (H)   ? Closed displaced intertrochanteric fracture of left femur, initial encounter (H)   ? Chest pain   ? Prolonged Q-T interval on ECG   ? Nonrheumatic aortic valve stenosis   ? Non-STEMI (non-ST elevated myocardial infarction) (H)   ? DNAR (do not attempt resuscitation)   ? Acute non-ST elevation myocardial infarction (NSTEMI) (H)   ? Hypomagnesemia   ? Stage 3 chronic kidney disease   ? Neovascular glaucoma of left eye, severe stage   ? Primary open angle glaucoma of right eye, moderate stage   ? Mild aortic stenosis   ? Cervical kyphosis   ? Diabetic peripheral neuropathy associated with type 2 diabetes mellitus (H)   ? Glaucoma   ? Chronic anemia   ? Hyperlipidemia   ? Diabetes mellitus (H)   ?  Essential hypertension     Past Medical History:   Diagnosis Date   ? Basal Cell Carcinoma Of The Skin Of The Trunk    ? Cataract 02/03/2016   ? Central retinal vein occlusion of left eye 02/03/2016   ? Chronic anemia 12/14/2014   ? Diabetes (H)    ? DNAR (do not attempt resuscitation)    ? Essential hypertension    ? Hyperlipidemia    ? Hypertension    ? Major depression in complete remission (H) 08/21/2019   ? Mild aortic stenosis 03/29/2017   ? Nuclear senile cataract of left eye 03/08/2018   ? Retinal hemorrhage, left 02/03/2016   ? Stage 3 chronic kidney disease 08/21/2019       Past Social History     Reviewed, and he  reports that he quit smoking about 42 years ago. His smoking use included cigarettes. He has a 10.00 pack-year smoking history. He has never used smokeless tobacco. He reports current alcohol use. He reports that he does not use drugs.    Family History     Reviewed, and family history includes Diabetes in his father; Hypertension in his mother; No Medical Problems in his daughter, daughter, and daughter.    Medication List   Post Discharge Medication Reconciliation Status: discharge medications reconciled, continue medications without change   Current Outpatient Medications on File Prior to Visit   Medication Sig Dispense Refill   ? acetaminophen (TYLENOL) 325 MG tablet Take 3 tablets (975 mg total) by mouth every 8 (eight) hours.  0   ? amLODIPine (NORVASC) 5 MG tablet Take 1 tablet (5 mg total) by mouth daily.  0   ? aspirin 81 MG EC tablet Take 1 tablet (81 mg total) by mouth daily.  0   ? atorvastatin (LIPITOR) 40 MG tablet Take 40 mg by mouth at bedtime.      ? [START ON 6/17/2021] clopidogreL (PLAVIX) 75 mg tablet Take 1 tablet (75 mg total) by mouth daily. AFTER 6 WEEKS OF LOVENOX 1 tablet 0   ? enoxaparin ANTICOAGULANT (LOVENOX) 30 mg/0.3 mL syringe Inject 0.3 mL (30 mg total) under the skin daily. 35 Syringe 0   ? escitalopram oxalate (LEXAPRO) 5 MG tablet TAKE 1 TABLET BY MOUTH EVERY  DAY 90 tablet 0   ? ferrous sulfate 325 (65 FE) MG tablet Take 1 tablet (325 mg total) by mouth every other day.  0   ? furosemide (LASIX) 40 MG tablet Take 1 tablet (40 mg total) by mouth daily.  0   ? gabapentin (NEURONTIN) 100 MG capsule Take 600 mg by mouth at bedtime.  0   ? glipiZIDE (GLUCOTROL) 5 MG tablet Take 5 mg by mouth daily.     ? isosorbide mononitrate (IMDUR) 30 MG 24 hr tablet Take 3 tablets (90 mg total) by mouth daily. 90 tablet 0   ? latanoprost (XALATAN) 0.005 % ophthalmic solution Administer 1 drop to both eyes bedtime.     ? magnesium oxide 250 mg magnesium Tab Take 1 tablet (250 mg total) by mouth 2 (two) times a day.  0   ? melatonin 3 mg Tab tablet Take 1 tablet (3 mg total) by mouth at bedtime.  0   ? metFORMIN (GLUCOPHAGE) 500 MG tablet TAKE 2 TABLETS BY MOUTH TWICE A DAY WITH FOOD 360 tablet 1   ? metoprolol succinate (TOPROL-XL) 50 MG 24 hr tablet Take 50 mg by mouth daily.     ? mirtazapine (REMERON) 7.5 MG tablet Take 1 tablet (7.5 mg total) by mouth at bedtime.  0   ? nitroglycerin (NITROSTAT) 0.4 MG SL tablet Take 0.4 mg by mouth every 5 (five) minutes as needed for chest pain.     ? polyethylene glycol (MIRALAX) 17 gram packet Take 1 packet (17 g total) by mouth daily as needed.  0   ? potassium chloride (K-DUR,KLOR-CON) 20 MEQ tablet Take 20 mEq by mouth 2 (two) times a day.     ? QUEtiapine (SEROQUEL) 25 MG tablet Take 0.5 tablets (12.5 mg total) by mouth 2 (two) times a day as needed (agitation).  0   ? senna-docusate (PERICOLACE) 8.6-50 mg tablet Take 1 tablet by mouth 2 (two) times a day. 60 tablet 0   ? sodium chloride (OCEAN) 0.65 % nasal spray 1 spray into each nostril as needed for congestion.     ? tamsulosin (FLOMAX) 0.4 mg cap Take 1 capsule (0.4 mg total) by mouth daily.  0     No current facility-administered medications on file prior to visit.        Allergies     No Known Allergies    Review of Systems   A comprehensive review of 14 systems was done. Pertinent  findings noted here and in history of present illness. All the rest negative.  Constitutional: Negative.  Negative for fever, chills, he has activity change, appetite change and fatigue.   Weight loss of 7 pounds since admission.  Oral intake though is more than 50% consistently  HENT: Negative for congestion and facial swelling.    Eyes: Negative for photophobia, redness and visual disturbance.   Respiratory: Negative for cough and chest tightness.    Cardiovascular: Negative for chest pain, palpitations and leg swelling.   Gastrointestinal: Negative for nausea, diarrhea, constipation, blood in stool and abdominal distention.   Genitourinary: Negative.    Musculoskeletal: Negative.  Had an acute fall and this is suspected to be self transferring  Skin: Negative.    Neurological: Negative for dizziness, tremors, syncope, weakness, light-headedness and headaches.   Hematological: Does not bruise/bleed easily.   Psychiatric/Behavioral: Agitated repeatedly saying he wants to go home but not sure where his home is      Physical Exam     Recent Vitals 5/13/2021   Height -   Weight 134 lbs 10 oz   BSA (m2) 1.77 m2   /68   Pulse 82   Temp 98.2   Temp src -   SpO2 95   Some recent data might be hidden   Admission weight was 140 pounds today his weight is 133 pounds.  Blood pressure 105/60    Constitutional: Oriented to person,  cachectic and appears well-developed.   HEENT:  Normocephalic and atraumatic.  Eyes: Conjunctivae and EOM are normal. Pupils are equal, round, and reactive to light. No discharge.  No scleral icterus. Nose normal. Mouth/Throat: Oropharynx is clear and moist. No oropharyngeal exudate.    NECK: Normal range of motion. Neck supple. No JVD present. No tracheal deviation present. No thyromegaly present.   CARDIOVASCULAR: Normal rate, regular rhythm and intact distal pulses.  Exam reveals no gallop and no friction rub.  Systolic murmur present.  PULMONARY: Effort normal and breath sounds normal. No  respiratory distress.No Wheezing or rales.  ABDOMEN: Soft. Bowel sounds are normal. No distension and no mass.  There is no tenderness. There is no rebound and no guarding. No HSM.  MUSCULOSKELETAL: Normal range of motion. No edema and no tenderness. Mild kyphosis, no tenderness.  L hip incision is intact  LYMPH NODES: Has no cervical, supraclavicular, axillary and groin adenopathy.   NEUROLOGICAL: Alert and oriented to persON. No cranial nerve deficit.  Normal muscle tone. Coordination normal.   GENITOURINARY: Deferred exam.  SKIN: Skin is warm and dry. No rash noted. No erythema. No pallor.   EXTREMITIES: No cyanosis, no clubbing, no edema. No Deformity.  PSYCHIATRIC: Normal mood, affect and behavior.  Very agitated repeatedly requesting that he be discharged home.  Not alert and oriented with no recall      Lab Results     Recent Results (from the past 240 hour(s))   HM2(CBC w/o Differential)    Collection Time: 05/14/21  8:25 AM   Result Value Ref Range    WBC 8.9 4.0 - 11.0 thou/uL    RBC 3.17 (L) 4.40 - 6.20 mill/uL    Hemoglobin 9.9 (L) 14.0 - 18.0 g/dL    Hematocrit 31.2 (L) 40.0 - 54.0 %    MCV 98 80 - 100 fL    MCH 31.2 27.0 - 34.0 pg    MCHC 31.7 (L) 32.0 - 36.0 g/dL    RDW 17.8 (H) 11.0 - 14.5 %    Platelets 424 140 - 440 thou/uL    MPV 9.8 8.5 - 12.5 fL   Basic Metabolic Panel    Collection Time: 05/14/21  8:25 AM   Result Value Ref Range    Sodium 145 136 - 145 mmol/L    Potassium 3.2 (L) 3.5 - 5.0 mmol/L    Chloride 107 98 - 107 mmol/L    CO2 24 22 - 31 mmol/L    Anion Gap, Calculation 14 5 - 18 mmol/L    Glucose 179 (H) 70 - 125 mg/dL    Calcium 9.3 8.5 - 10.5 mg/dL    BUN 33 (H) 8 - 28 mg/dL    Creatinine 1.39 (H) 0.70 - 1.30 mg/dL    GFR MDRD Af Amer 58 (L) >60 mL/min/1.73m2    GFR MDRD Non Af Amer 48 (L) >60 mL/min/1.73m2   Basic Metabolic Panel    Collection Time: 05/18/21  8:26 AM   Result Value Ref Range    Sodium 140 136 - 145 mmol/L    Potassium 3.6 3.5 - 5.0 mmol/L    Chloride 108 (H) 98 -  107 mmol/L    CO2 21 (L) 22 - 31 mmol/L    Anion Gap, Calculation 11 5 - 18 mmol/L    Glucose 173 (H) 70 - 125 mg/dL    Calcium 9.3 8.5 - 10.5 mg/dL    BUN 26 8 - 28 mg/dL    Creatinine 1.40 (H) 0.70 - 1.30 mg/dL    GFR MDRD Af Amer 57 (L) >60 mL/min/1.73m2    GFR MDRD Non Af Amer 47 (L) >60 mL/min/1.73m2   HM2(CBC w/o Differential)    Collection Time: 05/18/21  8:26 AM   Result Value Ref Range    WBC 10.9 4.0 - 11.0 thou/uL    RBC 3.36 (L) 4.40 - 6.20 mill/uL    Hemoglobin 10.4 (L) 14.0 - 18.0 g/dL    Hematocrit 33.9 (L) 40.0 - 54.0 %     (H) 80 - 100 fL    MCH 31.0 27.0 - 34.0 pg    MCHC 30.7 (L) 32.0 - 36.0 g/dL    RDW 19.3 (H) 11.0 - 14.5 %    Platelets 527 (H) 140 - 440 thou/uL    MPV 9.6 8.5 - 12.5 fL   Magnesium    Collection Time: 05/18/21  8:26 AM   Result Value Ref Range    Magnesium 1.8 1.8 - 2.6 mg/dL            Electronically signed by  SUSAN Oropeza

## 2021-06-23 NOTE — PROGRESS NOTES
Assessment:  1.  Non-insulin-dependent diabetes mellitus with diabetic peripheral neuropathy.  2.  Hypertension controlled.   3.  Hyperlipidemia checking status.  4.  Appropriate for disability parking certificate.    Plan: Check fasting A1c, lipid hepatic and basic profiles.  Continue current medication.  Since morning blood sugars are fine, if A1c is not good it would be likely from later in the day and recommend that he watch his portion sizes at his biggest meal of the day etc.  Follow-up at minimum in 3 months.  Filled out the disability parking certificate on the basis of the need to use the cane or walker etc.    Subjective: 89-year-old male presenting for follow-up on the above.  Regarding diabetes he does check blood sugars daily, he notes that they are usually about 1:30 in the morning.  He does not usually check later in the day.  This morning was in the upper 80s.  Regarding hypertension no headaches or dizziness or leg swelling.  Regarding lipids no diarrhea constipation muscle aching or muscle weakness.  He does have the peripheral neuropathy from the diabetes, he does feel that is stable for him.  He does normally use a cane or walker with ambulation related to that.  Past Medical History:   Diagnosis Date     Depression      Diabetes (H)      Diabetes (H)      Hyperlipidemia      Hypertension      No Known Allergies  Current Outpatient Medications   Medication Sig Dispense Refill     aspirin 81 MG EC tablet Take 81 mg by mouth bedtime.       blood glucose test (GLUCOSE BLOOD) strips Use 1 each As Directed daily before breakfast. Dispense brand per patient's insurance at pharmacy discretion. 100 strip 3     escitalopram oxalate (LEXAPRO) 5 MG tablet Take 1 tablet (5 mg total) by mouth daily. 90 tablet 1     furosemide (LASIX) 20 MG tablet Take 1 tablet (20 mg total) by mouth daily. 90 tablet 3     gabapentin (NEURONTIN) 600 MG tablet Take 1 tablet (600 mg total) by mouth at bedtime. 90 tablet 1      glipiZIDE-metFORMIN (METAGLIP) 5-500 mg per tablet Take 2 tablets by mouth 2 (two) times a day. 360 tablet 1     hydroCHLOROthiazide (MICROZIDE) 12.5 mg capsule Take 1 capsule (12.5 mg total) by mouth daily. 90 capsule 1     latanoprost (XALATAN) 0.005 % ophthalmic solution Administer 1 drop to both eyes bedtime.       lisinopril (PRINIVIL,ZESTRIL) 20 MG tablet TAKE ONE TABLET BY MOUTH ONE TIME DAILY 90 tablet 3     lovastatin (MEVACOR) 20 MG tablet Take 1 tablet (20 mg total) by mouth every evening. 90 tablet 3     metoprolol succinate (TOPROL-XL) 25 MG TAKE 1 TABLET (25 MG TOTAL) BY MOUTH DAILY. 90 tablet 2     potassium chloride (KLOR-CON M20) 20 MEQ tablet Take 1 tablet (20 mEq total) by mouth daily. 90 tablet 2     sodium chloride (OCEAN) 0.65 % nasal spray 1 spray into each nostril as needed for congestion.       verapamil (VERELAN PM) 240 MG 24 hr capsule TAKE ONE CAPSULE BY MOUTH ONE TIME DAILY 90 capsule 2     No current facility-administered medications for this visit.      All other review of systems are negative for any other changes.    Objective:/76   Pulse 60   Resp 24   Wt 147 lb (66.7 kg)   BMI 20.79 kg/m    HEENT examination shows no acute change.  He does have some kyphosis.  Neck supple without adenopathy or thyromegaly.  Lungs are clear to auscultation.  Heart regular rate and rhythm and he does have a grade 2/6 crescendo decrescendo systolic murmur consistent with the known aortic stenosis.  Abdomen shows no masses tenderness or hepatospleno megaly.  No pedal edema.  He is alert with clear speech.

## 2021-06-24 NOTE — TELEPHONE ENCOUNTER
Refill Approved    Rx renewed per Medication Renewal Policy. Medication was last renewed on 6/19/18.    Brianna Bird, [facility] Triage/Med Refill 3/4/2019     Requested Prescriptions   Pending Prescriptions Disp Refills     KLOR-CON M20 20 mEq tablet [Pharmacy Med Name: KLOR-CON M20 TABLET] 90 tablet 2     Sig: TAKE 1 TABLET BY MOUTH EVERY DAY    Potassium Supplements Refill Protocol Passed - 3/3/2019 12:30 AM       Passed - PCP or prescribing provider visit in past 12 months      Last office visit with prescriber/PCP: 1/17/2019 Parrish Silva MD OR same dept: 1/17/2019 Parrish Silva MD OR same specialty: 1/17/2019 Parrish Silva MD  Last physical: Visit date not found Last MTM visit: Visit date not found   Next visit within 3 mo: Visit date not found  Next physical within 3 mo: Visit date not found  Prescriber OR PCP: Parrish Silva MD  Last diagnosis associated with med order: 1. Benign Essential Hypertension  - KLOR-CON M20 20 mEq tablet [Pharmacy Med Name: KLOR-CON M20 TABLET]; TAKE 1 TABLET BY MOUTH EVERY DAY  Dispense: 90 tablet; Refill: 2    If protocol passes may refill for 12 months if within 3 months of last provider visit (or a total of 15 months).            Passed - Potassium level in last 12 months    Lab Results   Component Value Date    Potassium 4.1 01/17/2019

## 2021-06-25 NOTE — TELEPHONE ENCOUNTER
Refill Approved    Rx renewed per Medication Renewal Policy. Medication was last renewed on 6/19/2018.    Fernandez Montero, [facility] Triage/Med Refill 3/14/2019     Requested Prescriptions   Pending Prescriptions Disp Refills     metoprolol succinate (TOPROL-XL) 25 MG [Pharmacy Med Name: METOPROLOL SUCC ER 25 MG TAB] 90 tablet 2     Sig: TAKE 1 TABLET (25 MG TOTAL) BY MOUTH DAILY.    Beta-Blockers Refill Protocol Passed - 3/10/2019 12:36 AM       Passed - PCP or prescribing provider visit in past 12 months or next 3 months    Last office visit with prescriber/PCP: 1/17/2019 Parrish Silva MD OR same dept: 1/17/2019 Parrish Silva MD OR same specialty: 1/17/2019 Parrish Silva MD  Last physical: Visit date not found Last MTM visit: Visit date not found   Next visit within 3 mo: Visit date not found  Next physical within 3 mo: Visit date not found  Prescriber OR PCP: Parrish Silva MD  Last diagnosis associated with med order: 1. Benign Essential Hypertension  - metoprolol succinate (TOPROL-XL) 25 MG [Pharmacy Med Name: METOPROLOL SUCC ER 25 MG TAB]; TAKE 1 TABLET (25 MG TOTAL) BY MOUTH DAILY.  Dispense: 90 tablet; Refill: 2    If protocol passes may refill for 12 months if within 3 months of last provider visit (or a total of 15 months).            Passed - Blood pressure filed in past 12 months    BP Readings from Last 1 Encounters:   01/17/19 122/76

## 2021-06-25 NOTE — PROGRESS NOTES
[facility] Care note      Patient: Wicho Zhao  MRN: [id number]      [facility] [[id number]]  Reason for Visit     Chief Complaint   Patient presents with     Discharge Summary     Problem Visit   follow-up dementia/pain/dm    Code Status     DNR    Assessment     Diabetes with hypoglycemia now noted in the TCU worrisome for acute hypoglycemic episode.  Pain management reviewed  Left hip intertrochanteric fracture status post ORIF on 5/2/2021  Non-ST elevation MI cardial infarction  Acute diastolic congestive heart failure status post diuresis  Postoperative encephalopathy  Anemia requiring 2 units of packed RBC transfusion  Acute urinary retention  Moderate malnutrition  Cognitive impairment  Acute fall in the TCU  Generalized weakness    Plan     Patient admitted to the TCU for strengthening and rehab.  He was seen today at the request of nursing  He is a diabetic and remains on Metformin as well as glipizide 7.5 mg.  Blood sugar review done.  Blood sugars have been trending down  This puts him at risk of hypoglycemia with low blood sugars as much is 69 and 70 recorded in the morning for the last few days.  Oral intake reviewed with staff and appears to be adequate.  We will discontinue his high doses of glipizide.  Restart glipizide at 2.5 mg in the morning with breakfast  Continue with his Metformin  Also reviewed pain management and he is reporting no pain.  Change Tylenol to only as needed  Blood pressures are stable he has been taken off his amlodipine and Lasix is at 20 mg daily.  Clinically appears to be euvolemic  He will be on Lovenox till 6/17/2021 to finish a 6 weeks course of anticoagulation as recommended by Ortho  After that he will resume his Plavix    History     Patient is a very pleasant 89 y.o. male who is admitted to TCU  Patient admitted to the hospital after a mechanical fall.  He was diagnosed with a left hip intertrochanteric fracture.  Orthopedics was  consulted.  He underwent surgical repair on 5/2/2021.  Postoperatively he has been discharged to the TCU  Apparently incision is intact   Incision is healing well with no concerns.  Pain management reviewed both with him and staff at his request.  He is refusing Tylenol often and reports 0 pain.  He will be taken off scheduled Tylenol because of this issue.  He is also on Lovenox for 6 weeks total and [id number].  Subsequently we will go back on his Plavix as recommended by orthopedics  Unfortunately he had a very  lengthy postoperative course with multiple complications.  He developed acute blood loss anemia requiring 2 units of blood transfusion.  Discharge hemoglobin did improve to 8.4.  Recheck hemoglobin done in the TCU is improved to 10.4  He developed postoperative urinary retention.  He is on Flomax.  They had a Trujillo catheter in place which was removed prior to discharge.  In addition he developed postoperative encephalopathy.  He was on BiPAP and required ICU support.  While in the hospital he had delirium with recurrent agitation and was given Seroquel.  Family has been calling concerned about the fact that his behaviors are still not back to baseline   they have been requesting Ativan  Patient has been frequently yelling for help rather than requesting and communicating his needs  He is not alert and oriented to his surroundings.  Cognitively he is testing impaired slums is 13/30  Recheck CPT is 3.9  He also had an acute fall in the TCU.  No injuries he is attempting self transfer  Participation in therapy is limited  Unfortunately remains a very high fall risk.  Balance score is 13/28 indicating an extremely high fall risk  Blood pressures are frequently noted to be on the low side.  He is no longer on amlodipine.  Lasix has been reduced to 20 mg.  He has lost 7 pounds in the TCU    Past Medical History     Active Ambulatory (Non-Hospital) Problems    Diagnosis     Cognitive and behavioral changes      Postoperative delirium     Sleep difficulties     Metabolic encephalopathy     Acute respiratory failure with hypoxia (H)     Coronary artery disease involving native coronary artery of native heart without angina pectoris     Hypertension, unspecified type     Mitral valve stenosis, unspecified etiology     Dyslipidemia     Abnormal electrocardiogram     Status post aortic valve replacement     Closed intertrochanteric fracture of left hip, initial encounter (H)     Hip fracture requiring operative repair, left, closed, initial encounter (H)     Closed displaced intertrochanteric fracture of left femur, initial encounter (H)     Chest pain     Prolonged Q-T interval on ECG     Nonrheumatic aortic valve stenosis     Non-STEMI (non-ST elevated myocardial infarction) (H)     DNAR (do not attempt resuscitation)     Acute non-ST elevation myocardial infarction (NSTEMI) (H)     Hypomagnesemia     Stage 3 chronic kidney disease     Neovascular glaucoma of left eye, severe stage     Primary open angle glaucoma of right eye, moderate stage     Mild aortic stenosis     Cervical kyphosis     Diabetic peripheral neuropathy associated with type 2 diabetes mellitus (H)     Glaucoma     Chronic anemia     Hyperlipidemia     Diabetes mellitus (H)     Essential hypertension     Past Medical History:   Diagnosis Date     Basal Cell Carcinoma Of The Skin Of The Trunk      Cataract 02/03/2016     Central retinal vein occlusion of left eye 02/03/2016     Chronic anemia 12/14/2014     Diabetes (H)      DNAR (do not attempt resuscitation)      Essential hypertension      Hyperlipidemia      Hypertension      Major depression in complete remission (H) 08/21/2019     Mild aortic stenosis 03/29/2017     Nuclear senile cataract of left eye 03/08/2018     Retinal hemorrhage, left 02/03/2016     Stage 3 chronic kidney disease 08/21/2019       Past Social History     Reviewed, and he  reports that he quit smoking about 42 years ago. His smoking  use included cigarettes. He has a 10.00 pack-year smoking history. He has never used smokeless tobacco. He reports current alcohol use. He reports that he does not use drugs.    Family History     Reviewed, and family history includes Diabetes in his father; Hypertension in his mother; No Medical Problems in his daughter, daughter, and daughter.    Medication List   Post Discharge Medication Reconciliation Status: discharge medications reconciled, continue medications without change   Current Outpatient Medications on File Prior to Visit   Medication Sig Dispense Refill     acetaminophen (TYLENOL) 325 MG tablet Take 3 tablets (975 mg total) by mouth every 8 (eight) hours.  0     amLODIPine (NORVASC) 5 MG tablet Take 1 tablet (5 mg total) by mouth daily.  0     aspirin 81 MG EC tablet Take 1 tablet (81 mg total) by mouth daily.  0     atorvastatin (LIPITOR) 40 MG tablet Take 40 mg by mouth at bedtime.        [START ON 6/17/2021] clopidogreL (PLAVIX) 75 mg tablet Take 1 tablet (75 mg total) by mouth daily. AFTER 6 WEEKS OF LOVENOX 1 tablet 0     enoxaparin ANTICOAGULANT (LOVENOX) 30 mg/0.3 mL syringe Inject 0.3 mL (30 mg total) under the skin daily. 35 Syringe 0     escitalopram oxalate (LEXAPRO) 5 MG tablet TAKE 1 TABLET BY MOUTH EVERY DAY 90 tablet 0     ferrous sulfate 325 (65 FE) MG tablet Take 1 tablet (325 mg total) by mouth every other day.  0     furosemide (LASIX) 40 MG tablet Take 1 tablet (40 mg total) by mouth daily.  0     gabapentin (NEURONTIN) 100 MG capsule Take 600 mg by mouth at bedtime.  0     glipiZIDE (GLUCOTROL) 5 MG tablet Take 5 mg by mouth daily.       isosorbide mononitrate (IMDUR) 30 MG 24 hr tablet Take 3 tablets (90 mg total) by mouth daily. 90 tablet 0     latanoprost (XALATAN) 0.005 % ophthalmic solution Administer 1 drop to both eyes bedtime.       magnesium oxide 250 mg magnesium Tab Take 1 tablet (250 mg total) by mouth 2 (two) times a day.  0     melatonin 3 mg Tab tablet Take 1  tablet (3 mg total) by mouth at bedtime.  0     metFORMIN (GLUCOPHAGE) 500 MG tablet TAKE 2 TABLETS BY MOUTH TWICE A DAY WITH FOOD 360 tablet 1     metoprolol succinate (TOPROL-XL) 50 MG 24 hr tablet Take 50 mg by mouth daily.       mirtazapine (REMERON) 7.5 MG tablet Take 1 tablet (7.5 mg total) by mouth at bedtime.  0     nitroglycerin (NITROSTAT) 0.4 MG SL tablet Take 0.4 mg by mouth every 5 (five) minutes as needed for chest pain.       polyethylene glycol (MIRALAX) 17 gram packet Take 1 packet (17 g total) by mouth daily as needed.  0     potassium chloride (K-DUR,KLOR-CON) 20 MEQ tablet Take 20 mEq by mouth 2 (two) times a day.       QUEtiapine (SEROQUEL) 25 MG tablet Take 0.5 tablets (12.5 mg total) by mouth 2 (two) times a day as needed (agitation).  0     senna-docusate (PERICOLACE) 8.6-50 mg tablet Take 1 tablet by mouth 2 (two) times a day. 60 tablet 0     sodium chloride (OCEAN) 0.65 % nasal spray 1 spray into each nostril as needed for congestion.       tamsulosin (FLOMAX) 0.4 mg cap Take 1 capsule (0.4 mg total) by mouth daily.  0     No current facility-administered medications on file prior to visit.        Allergies     No Known Allergies    Review of Systems   A comprehensive review of 14 systems was done. Pertinent findings noted here and in history of present illness. All the rest negative.  Constitutional: Negative.  Negative for fever, chills, he has activity change, appetite change and fatigue.   Weight loss of 7 pounds since admission.  Oral intake though is more than 50% consistently  HENT: Negative for congestion and facial swelling.    Eyes: Negative for photophobia, redness and visual disturbance.   Respiratory: Negative for cough and chest tightness.    Cardiovascular: Negative for chest pain, palpitations and leg swelling.   Gastrointestinal: Negative for nausea, diarrhea, constipation, blood in stool and abdominal distention.   Genitourinary: Negative.    Musculoskeletal: Negative.   Had an acute fall and this is suspected to be self transferring  Skin: Negative.    Neurological: Negative for dizziness, tremors, syncope, weakness, light-headedness and headaches.   Hematological: Does not bruise/bleed easily.   Psychiatric/Behavioral: Agitated repeatedly saying he wants to go home but not sure where his home is      Physical Exam     Recent Vitals 5/13/2021   Height -   Weight 134 lbs 10 oz   BSA (m2) 1.77 m2   /68   Pulse 82   Temp 98.2   Temp src -   SpO2 95   Some recent data might be hidden   Admission weight was 140 pounds today his weight is 133 pounds.  Blood pressure 105/60    Constitutional: Oriented to person,  cachectic and appears well-developed.   HEENT:  Normocephalic and atraumatic.  Eyes: Conjunctivae and EOM are normal. Pupils are equal, round, and reactive to light. No discharge.  No scleral icterus. Nose normal. Mouth/Throat: Oropharynx is clear and moist. No oropharyngeal exudate.    NECK: Normal range of motion. Neck supple. No JVD present. No tracheal deviation present. No thyromegaly present.   CARDIOVASCULAR: Normal rate, regular rhythm and intact distal pulses.  Exam reveals no gallop and no friction rub.  Systolic murmur present.  PULMONARY: Effort normal and breath sounds normal. No respiratory distress.No Wheezing or rales.  ABDOMEN: Soft. Bowel sounds are normal. No distension and no mass.  There is no tenderness. There is no rebound and no guarding. No HSM.  MUSCULOSKELETAL: Normal range of motion. No edema and no tenderness. Mild kyphosis, no tenderness.  L hip incision is intact  LYMPH NODES: Has no cervical, supraclavicular, axillary and groin adenopathy.   NEUROLOGICAL: Alert and oriented to persON. No cranial nerve deficit.  Normal muscle tone. Coordination normal.   GENITOURINARY: Deferred exam.  SKIN: Skin is warm and dry. No rash noted. No erythema. No pallor.   EXTREMITIES: No cyanosis, no clubbing, no edema. No Deformity.  PSYCHIATRIC: Normal mood,  affect and behavior.  Very agitated repeatedly requesting that he be discharged home.  Not alert and oriented with no recall      Lab Results     Results for orders placed or performed in visit on 05/18/21   Basic Metabolic Panel   Result Value Ref Range    Sodium 140 136 - 145 mmol/L    Potassium 3.6 3.5 - 5.0 mmol/L    Chloride 108 (H) 98 - 107 mmol/L    CO2 21 (L) 22 - 31 mmol/L    Anion Gap, Calculation 11 5 - 18 mmol/L    Glucose 173 (H) 70 - 125 mg/dL    Calcium 9.3 8.5 - 10.5 mg/dL    BUN 26 8 - 28 mg/dL    Creatinine 1.40 (H) 0.70 - 1.30 mg/dL    GFR MDRD Af Amer 57 (L) >60 mL/min/1.73m2    GFR MDRD Non Af Amer 47 (L) >60 mL/min/1.73m2     MEDICAL EQUIPMENT NEEDS:  walker     DISCHARGE PLAN/FACE TO FACE:  I certify that services are/were furnished while this patient was under the care of a physician and that a physician or an allowed non-physician practitioner (NPP), had a face-to-face encounter that meets the physician face-to-face encounter requirements. The encounter was in whole, or in part, related to the primary reason for home health. The patient is confined to his/her home and needs intermittent skilled nursing, physical therapy, speech-language pathology, or the continued need for occupational therapy. A plan of care has been established by a physician and is periodically reviewed by a physician.  Date of Face-to-Face Encounter: 6/1/21     I certify that, based on my findings, the following services are medically necessary home health services: [facility] HHA/RN and PT/OT to evaluate and treat    My clinical findings support the need for the above skilled services because: patient will be discharging to home. Patient will need assistance with medication management and performing IADLs and ADLs effectively and safely.     Patient to re-establish plan of care with their PCP within 7 days after leaving TCU.    Total time spent is 35 minutes in this discharge planning including reviewing diabetic medications  and adjusting medications pain management review as well as discharge planning and management of blood pressure and weights with patient being on a higher dose of Lasix.  He is advised close follow-up with his primary care physician on discharge      Electronically signed by  SUSAN Oropeza

## 2021-06-30 NOTE — PROGRESS NOTES
Progress Notes by Amado Holland DO at 3/19/2021 10:30 AM     Author: Amado Holland DO Service: -- Author Type: Physician    Filed: 3/19/2021  1:50 PM Encounter Date: 3/19/2021 Status: Signed    : Amado Holland DO (Physician)             Assessment/Recommendations   Assessment:    1.  Non-ST elevated myocardial infarction.  Stable symptoms at this time.    2.  Moderate aortic stenosis.  3.  Hypertension, controled.    4.  Left ventricular hypertrophy, with heart failure with preserved ejection fraction.   Lab Results   Component Value Date     (H) 03/17/2017     No pain no pain  Plan:   1.  Continue with medical therapy for presumed coronary disease and recent non-ST elevated myocardial infarction with metoprolol, plavix, ASA, imdur 60 m past g and statin.   2.  Continue lisinopril for hypertension  3.  Lasix for heart failure with preserved ejection fraction.         History of Present Illness/Subjective    Mr. Wicho Zhao is a 89 y.o. male with presumed coronary disease, history of congestive heart failure who presents to cardiology clinic after recent hospitalization at [facility] for non-ST elevated myocardial infarction.    Currently patient denies any active chest pain symptoms.  He states he had an episode of chest pain prior to presenting to [facility] lasted for 2030 minutes.  Arrived from EMS he received nitroglycerin which resolved his chest pain.  Given his age of 89 years old it was elected that he would continue with medical therapy.  Did not undergo coronary angiogram.  Echocardiogram demonstrated ejection fraction 50%.  There is inferior wall motion abnormality noted.  He continues to have mild to moderate calcific aortic stenosis.  Based on echo report was reviewed    He was started on Plavix and Imdur therapy along with continuation of his beta-blocker aspirin and statin therapy.  He denies any active chest pain symptoms at this time.  No  recurrence of his chest pain symptoms with Imdur at 60 mg daily.  Denies any orthopnea or PND symptoms.  Overall is doing better.  He is in assisted living.      ECHOCARDIOGRAM: [facility] 2021  Final Conclusion   1. Normal left ventricular chamber size. Mildly-moderately increased left ventricular wall   thickness. Mild hypokinesis of the basal   inferior wall and basal inferior septum. Borderline decreased left ventricular systolic   function. Calculated left ventricular ejection   fraction (modified Art technique) is 50%.   2. Mild-moderate calcific aortic valve stenosis. Aortic valve systolic peak velocity is 2.8   m/sec. Aortic valve area by Doppler is 1.2 cm .   Aortic valve dimensionless index is 0.32.   3. Moderately calcified mitral annulus. Moderately thickened mitral valve. Mild mitral valve   regurgitation. Mild mitral inflow   obstruction. Mitral valve diastolic mean gradient is 4 mmHg (at a HR of 77 bpm).   4. Normal right ventricular chamber size. Normal right ventricular systolic function.   Estimated right ventricular systolic pressure is 36   mmHg.   5. Mild tricuspid valve regurgitation.     Physical Examination Review of Systems   Vitals:    03/19/21 1033   BP: 124/76   Pulse: 68   Resp: 18     Body mass index is 18.34 kg/m .  Wt Readings from Last 3 Encounters:   03/19/21 139 lb (63 kg)   12/01/20 134 lb 12.8 oz (61.1 kg)   09/23/20 140 lb (63.5 kg)     [unfilled]  General Appearance:   no distress, normal body habitus   ENT/Mouth: membranes moist, no oral lesions or bleeding gums.      EYES:  no scleral icterus, normal conjunctivae   Neck: no carotid bruits or thyromegaly   Chest/Lungs:   lungs are clear to auscultation, no rales or wheezing, no sternal scar, equal chest wall expansion    Cardiovascular:   Regular. Normal first and second heart sounds with no murmurs, rubs, or gallops; the carotid, radial and posterior tibial pulses are intact, Jugular venous pressure, no edema bilaterally     Abdomen:  no organomegaly, masses, bruits, or tenderness; bowel sounds are present   Extremities: no cyanosis or clubbing   Skin: no xanthelasma, warm.    Neurologic: normal  bilateral, no tremors     Psychiatric: alert and oriented x3, calm     General: WNL  Eyes: WNL  Ears/Nose/Throat: WNL  Lungs: WNL  Heart: WNL  Stomach: WNL  Bladder: WNL  Muscle/Joints: WNL  Skin: WNL  Nervous System: WNL  Mental Health: WNL     Blood: WNL     Medical History  Surgical History Family History Social History   Past Medical History:   Diagnosis Date   ? Basal Cell Carcinoma Of The Skin Of The Trunk    ? Cataract 02/03/2016   ? Central retinal vein occlusion of left eye 02/03/2016   ? Chronic anemia 12/14/2014   ? Diabetes (H)    ? DNAR (do not attempt resuscitation)    ? Essential hypertension    ? Hyperlipidemia    ? Hypertension    ? Major depression in complete remission (H) 08/21/2019   ? Mild aortic stenosis 03/29/2017    mean gradient of 16 mm of Hg by echo   ? Nuclear senile cataract of left eye 03/08/2018   ? Retinal hemorrhage, left 02/03/2016   ? Stage 3 chronic kidney disease 08/21/2019    Past Surgical History:   Procedure Laterality Date   ? CHOLECYSTECTOMY OPEN     ? [place] REPAIR ACHILLES TENDON,PRIMARY      Primary Repair Of Ruptured Achilles Tendon    Family History   Problem Relation Age of Onset   ? Hypertension Mother    ? Diabetes Father    ? No Medical Problems Daughter    ? No Medical Problems Daughter    ? No Medical Problems Daughter     Social History     Socioeconomic History   ? Marital status:      Spouse name: Not on file   ? Number of children: 3   ? Years of education: 12   ? Highest education level: High school graduate   Occupational History   ? Occupation: made prototypes     Employer: RETIRED     Comment: [employer]   Social Needs   ? Financial resource strain: Not on file   ? Food insecurity     Worry: Not on file     Inability: Not on file   ? Transportation needs     Medical: Not on file      Non-medical: Not on file   Tobacco Use   ? Smoking status: Former Smoker     Packs/day: 0.50     Years: 20.00     Pack years: 10.00     Types: Cigarettes     Quit date:      Years since quittin.2   ? Smokeless tobacco: Never Used   Substance and Sexual Activity   ? Alcohol use: Yes     Comment: 1 beer per month   ? Drug use: No   ? Sexual activity: Not on file   Lifestyle   ? Physical activity     Days per week: Not on file     Minutes per session: Not on file   ? Stress: Not on file   Relationships   ? Social connections     Talks on phone: Not on file     Gets together: Not on file     Attends Ep[employer]alian service: Not on file     Active member of club or organization: Not on file     Attends meetings of clubs or organizations: Not on file     Relationship status: Not on file   ? Intimate partner violence     Fear of current or ex partner: Not on file     Emotionally abused: Not on file     Physically abused: Not on file     Forced sexual activity: Not on file   Other Topics Concern   ? 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  After the war, he worked making [employer] at the [employer]. He was  in 2018.  He lives alone in his own home.          Medications  Allergies   Current Outpatient Medications   Medication Sig Dispense Refill   ? amLODIPine (NORVASC) 5 MG tablet Take 1 tablet (5 mg total) by mouth daily.  0   ? aspirin 81 MG EC tablet Take 81 mg by mouth daily.      ? atorvastatin (LIPITOR) 40 MG tablet Take 40 mg by mouth daily.      ? clopidogreL (PLAVIX) 75 mg tablet 75 mg.      ? escitalopram oxalate (LEXAPRO) 5 MG tablet TAKE 1 TABLET BY MOUTH EVERY DAY 90 tablet 3   ? gabapentin (NEURONTIN) 600 MG tablet TAKE 1 TABLET (600 MG TOTAL) BY MOUTH AT BEDTIME. (Patient taking differently: Take 900 mg by mouth at bedtime. ) 90 tablet 3   ? glipiZIDE (GLUCOTROL) 5 MG tablet Take 5 mg by mouth daily.     ?  isosorbide mononitrate (IMDUR) 60 MG 24 hr tablet 60 mg daily.      ? latanoprost (XALATAN) 0.005 % ophthalmic solution Administer 1 drop to both eyes bedtime.     ? magnesium 250 mg Tab Take by mouth daily.     ? metFORMIN (GLUCOPHAGE) 500 MG tablet TAKE 2 TABLETS BY MOUTH TWICE A DAY WITH FOOD 360 tablet 1   ? metoprolol succinate (TOPROL-XL) 25 MG Take 1 tablet (25 mg total) by mouth daily. (Patient taking differently: Take 50 mg by mouth daily. ) 90 tablet 2   ? ONETOUCH VERIO TEST STRIPS strips USE TO TEST BLOOD SUGAR ONCE DAILY BEFORE BREAKFAST. 100 strip 3   ? polyethylene glycol (MIRALAX) 17 gram packet Take 1 packet (17 g total) by mouth daily.  0   ? furosemide (LASIX) 20 MG tablet Take 20 mg by mouth daily.     ? hydroCHLOROthiazide (MICROZIDE) 12.5 mg capsule TAKE 1 CAPSULE BY MOUTH EVERY DAY 90 capsule 3   ? lisinopriL (PRINIVIL,ZESTRIL) 40 MG tablet Take 1 tablet (40 mg total) by mouth daily. (Patient taking differently: Take 20 mg by mouth daily. )  0   ? lovastatin (MEVACOR) 20 MG tablet TAKE 1 TABLET (20 MG TOTAL) BY MOUTH EVERY EVENING. 90 tablet 1   ? potassium chloride (K-DUR,KLOR-CON) 10 MEQ tablet Take 10 mEq by mouth 2 (two) times a day.     ? sodium chloride (OCEAN) 0.65 % nasal spray 1 spray into each nostril as needed for congestion.     ? verapamiL (VERELAN PM) 240 MG 24 hr capsule TAKE 1 CAPSULE BY MOUTH EVERY DAY 90 capsule 3     No current facility-administered medications for this visit.     No Known Allergies      Lab Results    Chemistry/lipid CBC Cardiac Enzymes/BNP/TSH/INR   Lab Results   Component Value Date    CHOL 153 12/18/2019    HDL 30 (L) 12/18/2019    LDLCALC 99 12/18/2019    TRIG 121 12/18/2019    CREATININE 1.43 (H) 03/15/2021    BUN 19 03/15/2021    K 4.2 03/15/2021     03/15/2021     (H) 03/15/2021    CO2 24 03/15/2021    Lab Results   Component Value Date    WBC 5.5 03/15/2021    HGB 10.1 (L) 03/15/2021    HCT 31.8 (L) 03/15/2021    MCV 97 03/15/2021    PLT  284 03/15/2021    Lab Results   Component Value Date    CKTOTAL 224 (H) 12/01/2020    TROPONINI 2.94 (HH) 11/29/2020     (H) 03/17/2017    TSH 1.56 01/18/2021    INR 1.18 (H) 11/28/2020

## 2021-07-03 NOTE — ANESTHESIA PREPROCEDURE EVALUATION
Anesthesia Preprocedure Evaluation by Moise Stein MD at 5/2/2021  2:26 PM     Author: Moise Stein MD Service: -- Author Type: Physician    Filed: 5/2/2021  3:02 PM Date of Service: 5/2/2021  2:26 PM Status: Addendum    : Moise Stein MD (Physician)    Related Notes: Original Note by Moise Stein MD (Physician) filed at 5/2/2021  2:30 PM       Anesthesia Evaluation      Patient summary reviewed   No history of anesthetic complications     Airway   Mallampati: II  Neck ROM: full   Pulmonary - normal exam    breath sounds clear to auscultation  (+) shortness of breath,                          Cardiovascular   (+) hypertension, valvular problems/murmurs MS, MR and AS, past MI (NSTEMI in Feb 2021; last took plavix on 5/1/21), CAD, , hypercholesterolemia,     Rhythm: regular  Rate: normal,    murmur      Neuro/Psych    (+) neuromuscular disease (Diabetic peripheral neuropathy ),  depression,     Endo/Other    (+) diabetes mellitus type 2,      Comments: Glaucoma    GI/Hepatic/Renal    (+)   chronic renal disease CRI,           Dental    (+) poor dentition    Comment: Multiple missing teeth                         Anesthesia Plan  Planned anesthetic: general endotracheal  Gen ETT  Induction with etomidate  4mg decadron and zofran     After an extensive discussion with the patient and his daughter, Maureen Iniguez, it was decided to proceed with surgery as well as place the DNI on hold while keeping the DNR active. We discussed the results of his echocardiogram, and both the patient and his daughter confirmed that they would not like to pursue any additional cardiac work up at this time. Given his recent NSTEMI in [facility] and lack of additional cardiac imaging to indicate his coronary artery status, it was explained that Mr. Zhao is at high risk for this surgical procedure under a general anesthetic (spinal is not an option given last dose of plavix was on 5/1/21). Both  patient and Maureen confirmed understanding and wished to proceed.   ASA 4 - emergent   Induction: intravenous   Anesthetic plan and risks discussed with: patient and child/children  Anesthesia plan special considerations: antiemetics,   Post-op plan: routine recovery  DNR/DNI status   DNR/DNI status discussed with patient.  Suspension of DNR: DNI is suspended for perioperative period, however DNR is NOT.

## 2021-07-04 NOTE — PROGRESS NOTES
"Progress Notes by Amado Holland DO at 6/11/2021 11:10 AM     Author: Amado Holland DO Service: -- Author Type: Physician    Filed: 6/11/2021 11:34 AM Encounter Date: 6/11/2021 Status: Signed    : Amado Holland DO (Physician)           The patient has been notified of following:     \"This video visit will be conducted via a call between you and your physician/provider. We have found that certain health care needs can be provided without the need for an in-person physical exam.  This service lets us provide the care you need with a video conversation.  If a prescription is necessary we can send it directly to your pharmacy.  If lab work is needed we can place an order for that and you can then stop by our lab to have the test done at a later time.      Patient has given verbal consent to a Video visit? Yes    HEART CARE VIDEO ENCOUNTER        The patient has chosen to have the visit conducted as a video visit, to reduce risk of exposure given the current status of Coronavirus in our community. This video visit is being conducted via a call between the patient and physician/provider. Health care needs are being provided without a physical exam.     Assessment/Recommendations   Assessment/Plan:  1.  Non-ST elevated myocardial infarction.  Stable symptoms at this time.    2.  Moderate aortic stenosis.  3.  Moderate mitral stenosis  4.  Hypertension, controled.    5.  Left ventricular hypertrophy, with heart failure with preserved ejection fraction.   Lab Results   Component Value Date     (H) 03/17/2017   6. Recent hip fracture with emergency surgical intervention.       Plan:   1.  Continue with medical therapy for presumed coronary disease and recent non-ST elevated myocardial infarction with metoprolol, plavix, ASA, imdur 30 mg daily.   2.  Continue lisinopril for hypertension  3.  Lasix for heart failure with preserved ejection fraction.  4.  Remains on atorvastatin 40 mg " daily.     Family is considering transition to hospice.    Follow Up Plan:  6 months  Total time of video between patient and provider was 9 minutes   Start Time:11:18 AM  Stop Time:11:27AM    Originating Location (pt. Location): Assisted Living    Distant Location (provider location):  [facility]    Mode of Communication:  Video Conference via [employer]       History of Present Illness/Subjective    Wicho Zhao is a 89 y.o. male who is being evaluated via a billable video visit and has consented to a video visit.     Wicho Zhao has a history of coronary artery disease, recent non-ST elevated myocardial infarction, recent hospitalization for fractured hip who presents to cardiology clinic via video encounter.    Since last evaluation patient was hospitalized for acute hip fracture in early May 2021.  During this hospitalization he underwent emergent surgery to repair his left hip.  His evaluated Dr. Powell which I reviewed consultation notes.  Tolerated procedure well.  Is recovering back at home at this point with home care.  During the hospitalization he did have an echocardiogram which was personally reviewed.  Demonstrated similar findings to prior.  He does have moderate aortic stenosis moderate mitral stenosis and heart failure with preserved ejection fraction.    Currently is on aspirin and Plavix no active bleeding issues.  He remains on Lasix therapy at 40 mg with no significant tension fluid.  From a symptom standpoint he denies any active chest pain, dyspnea lower extremity edema or orthopnea symptoms.    Accompanied today on the video with his daughter.    Total time of visit was 20 minutes including chart review, review of prior consultation notes, review of echocardiogram results and reviewed discharge summary.    I have reviewed and updated the patient's Past Medical History, Social History, Family History and Medication List.     Physical Examination  performed via live video encounter Review of Systems   General Appearance:   no distress, normal body habitus, upright in chair with legs elevated   ENT/Mouth: membranes moist, no nasal discharge or bleeding gums.  Normal head shape, no evidence of injury or laceration.     EYES:  no scleral icterus, normal conjunctivae   Neck: no evidence of thyromegaly.  Supple   Chest/Lungs:   No audible wheezing equal chest wall expansion. Non labored breathing.  No cough.   Cardiovascular:   No evidence of elevated jugular venous pressure.  No evidence of pitting edema bilaterally in his legs.   Abdomen:  no evidence of abdominal distention. No observe juandice.     Extremities: no cyanosis or clubbing noted.    Skin: no xanthelasma, normal skin color. No evidence of facial lacerations.      Neurologic: Normal arm motion bilateral, no tremors.  No evidence of focal defect.       Psychiatric: alert and oriented x3, calm     Review of Systems - History obtained from the patient  General ROS: negative  Psychological ROS: negative  Ophthalmic ROS: negative  ENT ROS: negative  Allergy and Immunology ROS: negative  Hematological and Lymphatic ROS: negative  Endocrine ROS: negative  Respiratory ROS: negative for - cough or wheezing  Cardiovascular ROS:  negative for - chest pain, dyspnea on exertion, edema, irregular heartbeat, loss of consciousness, orthopnea or paroxysmal nocturnal dyspnea  Gastrointestinal ROS: no abdominal pain, change in bowel habits, or black or bloody stools  Genito-Urinary ROS: no dysuria, trouble voiding, or hematuria  Musculoskeletal ROS: negative  Neurological ROS: no acute TIA or stroke symptoms  Dermatological ROS: negative       Medical History  Surgical History Family History Social History   Past Medical History:   Diagnosis Date   ? Basal Cell Carcinoma Of The Skin Of The Trunk    ? Cataract 02/03/2016   ? Central retinal vein occlusion of left eye 02/03/2016   ? Chronic anemia 12/14/2014   ? Diabetes  (H)    ? DNAR (do not attempt resuscitation)    ? Essential hypertension    ? Hyperlipidemia    ? Hypertension    ? Major depression in complete remission (H) 2019   ? Mild aortic stenosis 2017    mean gradient of 16 mm of Hg by echo   ? Nuclear senile cataract of left eye 2018   ? Retinal hemorrhage, left 2016   ? Stage 3 chronic kidney disease 2019    Past Surgical History:   Procedure Laterality Date   ? CHOLECYSTECTOMY OPEN     ? HIP PINNING Left 2021    Procedure: INTERNAL FIXATION, FRACTURE, TROCHANTERIC, LEFT HIP, USING NIAL;  Surgeon: Liban Graves MD;  Location: [facility] OR;  Service: Orthopedics   ? ND REPAIR ACHILLES TENDON,PRIMARY      Primary Repair Of Ruptured Achilles Tendon    Family History   Problem Relation Age of Onset   ? Hypertension Mother    ? Diabetes Father    ? No Medical Problems Daughter    ? No Medical Problems Daughter    ? No Medical Problems Daughter       Social History     Socioeconomic History   ? Marital status:      Spouse name: Not on file   ? Number of children: 3   ? Years of education: 12   ? Highest education level: High school graduate   Occupational History   ? Occupation: made prototypes     Employer: RETIRED     Comment: [employer]   Social Needs   ? Financial resource strain: Not on file   ? Food insecurity     Worry: Not on file     Inability: Not on file   ? Transportation needs     Medical: Not on file     Non-medical: Not on file   Tobacco Use   ? Smoking status: Former Smoker     Packs/day: 0.50     Years: 20.00     Pack years: 10.00     Types: Cigarettes     Quit date:      Years since quittin.4   ? Smokeless tobacco: Never Used   Substance and Sexual Activity   ? Alcohol use: Yes     Comment: 1 beer per month   ? Drug use: No   ? Sexual activity: Not on file   Lifestyle   ? Physical activity     Days per week: Not on file     Minutes per session: Not on file   ? Stress: Not on file   Relationships   ?  Social connections     Talks on phone: Not on file     Gets together: Not on file     Attends Orthodox service: Not on file     Active member of club or organization: Not on file     Attends meetings of clubs or organizations: Not on file     Relationship status: Not on file   ? Intimate partner violence     Fear of current or ex partner: Not on file     Emotionally abused: Not on file     Physically abused: Not on file     Forced sexual activity: Not on file   Other Topics Concern   ? 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  After the war, he worked making [employer] at the [employer]. He was  in 2018.  He lives alone in his own home.          Medications  Allergies   Current Outpatient Medications   Medication Sig Dispense Refill   ? acetaminophen (TYLENOL) 325 MG tablet Take 3 tablets (975 mg total) by mouth every 8 (eight) hours. (Patient taking differently: Take 650 mg by mouth every 6 (six) hours as needed. )  0   ? aspirin 81 MG EC tablet Take 1 tablet (81 mg total) by mouth daily.  0   ? atorvastatin (LIPITOR) 40 MG tablet Take 40 mg by mouth at bedtime.      ? enoxaparin ANTICOAGULANT (LOVENOX) 30 mg/0.3 mL syringe Inject 0.3 mL (30 mg total) under the skin daily. 35 Syringe 0   ? escitalopram oxalate (LEXAPRO) 5 MG tablet TAKE 1 TABLET BY MOUTH EVERY DAY 90 tablet 0   ? ferrous sulfate 325 (65 FE) MG tablet Take 1 tablet (325 mg total) by mouth every other day.  0   ? gabapentin (NEURONTIN) 100 MG capsule Take 600 mg by mouth at bedtime.  0   ? glipiZIDE (GLUCOTROL) 5 MG tablet Take 2.5 mg by mouth daily.      ? isosorbide mononitrate (IMDUR) 30 MG 24 hr tablet Take 3 tablets (90 mg total) by mouth daily. 90 tablet 0   ? latanoprost (XALATAN) 0.005 % ophthalmic solution Administer 1 drop to both eyes bedtime.     ? magnesium oxide 250 mg magnesium Tab Take 1 tablet (250 mg total) by mouth 2 (two) times a day.  0    ? melatonin 3 mg Tab tablet Take 1 tablet (3 mg total) by mouth at bedtime.  0   ? metFORMIN (GLUCOPHAGE) 500 MG tablet TAKE 2 TABLETS BY MOUTH TWICE A DAY WITH FOOD 360 tablet 1   ? metoprolol succinate (TOPROL-XL) 50 MG 24 hr tablet Take 50 mg by mouth daily.     ? nitroglycerin (NITROSTAT) 0.4 MG SL tablet Take 0.4 mg by mouth every 5 (five) minutes as needed for chest pain.     ? polyethylene glycol (MIRALAX) 17 gram packet Take 1 packet (17 g total) by mouth daily as needed.  0   ? sodium chloride (OCEAN) 0.65 % nasal spray 1 spray into each nostril as needed for congestion.     ? [START ON 6/17/2021] clopidogreL (PLAVIX) 75 mg tablet Take 1 tablet (75 mg total) by mouth daily. AFTER 6 WEEKS OF LOVENOX 1 tablet 0   ? furosemide (LASIX) 40 MG tablet Take 1 tablet (40 mg total) by mouth daily. (Patient taking differently: Take 20 mg by mouth daily. )  0   ? mirtazapine (REMERON) 7.5 MG tablet Take 1 tablet (7.5 mg total) by mouth at bedtime.  0   ? potassium chloride (K-DUR,KLOR-CON) 20 MEQ tablet Take 20 mEq by mouth 2 (two) times a day.     ? QUEtiapine (SEROQUEL) 25 MG tablet Take 0.5 tablets (12.5 mg total) by mouth 2 (two) times a day as needed (agitation).  0   ? senna-docusate (PERICOLACE) 8.6-50 mg tablet Take 1 tablet by mouth 2 (two) times a day. 60 tablet 0   ? tamsulosin (FLOMAX) 0.4 mg cap Take 1 capsule (0.4 mg total) by mouth daily.  0     No current facility-administered medications for this visit.     No Known Allergies      Lab Results    Chemistry/lipid CBC Cardiac Enzymes/BNP/TSH/INR   Lab Results   Component Value Date    CHOL 153 12/18/2019    HDL 30 (L) 12/18/2019    LDLCALC 99 12/18/2019    TRIG 121 12/18/2019    CREATININE 1.08 06/07/2021    BUN 24 06/07/2021    K 4.0 06/07/2021     06/07/2021     06/07/2021    CO2 23 06/07/2021    Lab Results   Component Value Date    WBC 6.1 06/07/2021    HGB 10.7 (L) 06/07/2021    HCT 33.7 (L) 06/07/2021     (H) 06/07/2021    PLT  275 06/07/2021    Lab Results   Component Value Date    CKTOTAL 224 (H) 12/01/2020    TROPONINI 0.02 05/02/2021    BNP 1,466 (H) 05/02/2021    TSH 1.56 01/18/2021    INR 1.16 (H) 05/01/2021        Amado Holland

## 2021-07-04 NOTE — LETTER
Letter by Jessica Oneal MBBS at      Author: Jessica Oneal MBBS Service: -- Author Type: --    Filed:  Encounter Date: 6/1/2021 Status: (Other)         Patient: Wicho Zhao   MR Number: [id number]   YOB: 1927   Date of Visit: 6/1/2021       [facility] note      Patient: Wicho Zhao  MRN: [id number]      [facility] [[id number]]  Reason for Visit     Chief Complaint   Patient presents with   ? Discharge Summary   ? Problem Visit   follow-up dementia/pain/dm    Code Status     DNR    Assessment     Diabetes with hypoglycemia now noted in the TCU worrisome for acute hypoglycemic episode.  Pain management reviewed  Left hip intertrochanteric fracture status post ORIF on 5/2/2021  Non-ST elevation MI cardial infarction  Acute diastolic congestive heart failure status post diuresis  Postoperative encephalopathy  Anemia requiring 2 units of packed RBC transfusion  Acute urinary retention  Moderate malnutrition  Cognitive impairment  Acute fall in the TCU  Generalized weakness    Plan     Patient admitted to the TCU for strengthening and rehab.  He was seen today at the request of nursing  He is a diabetic and remains on Metformin as well as glipizide 7.5 mg.  Blood sugar review done.  Blood sugars have been trending down  This puts him at risk of hypoglycemia with low blood sugars as much is 69 and 70 recorded in the morning for the last few days.  Oral intake reviewed with staff and appears to be adequate.  We will discontinue his high doses of glipizide.  Restart glipizide at 2.5 mg in the morning with breakfast  Continue with his Metformin  Also reviewed pain management and he is reporting no pain.  Change Tylenol to only as needed  Blood pressures are stable he has been taken off his amlodipine and Lasix is at 20 mg daily.  Clinically appears to be euvolemic  He will be on Lovenox till 6/17/2021 to finish a 6 weeks course of anticoagulation as recommended by  Ortho  After that he will resume his Plavix    History     Patient is a very pleasant 89 y.o. male who is admitted to TCU  Patient admitted to the hospital after a mechanical fall.  He was diagnosed with a left hip intertrochanteric fracture.  Orthopedics was consulted.  He underwent surgical repair on 5/2/2021.  Postoperatively he has been discharged to the TCU  Apparently incision is intact   Incision is healing well with no concerns.  Pain management reviewed both with him and staff at his request.  He is refusing Tylenol often and reports 0 pain.  He will be taken off scheduled Tylenol because of this issue.  He is also on Lovenox for 6 weeks total and [id number].  Subsequently we will go back on his Plavix as recommended by orthopedics  Unfortunately he had a very  lengthy postoperative course with multiple complications.  He developed acute blood loss anemia requiring 2 units of blood transfusion.  Discharge hemoglobin did improve to 8.4.  Recheck hemoglobin done in the TCU is improved to 10.4  He developed postoperative urinary retention.  He is on Flomax.  They had a Trujillo catheter in place which was removed prior to discharge.  In addition he developed postoperative encephalopathy.  He was on BiPAP and required ICU support.  While in the hospital he had delirium with recurrent agitation and was given Seroquel.  Family has been calling concerned about the fact that his behaviors are still not back to baseline   they have been requesting Ativan  Patient has been frequently yelling for help rather than requesting and communicating his needs  He is not alert and oriented to his surroundings.  Cognitively he is testing impaired slums is 13/30  Recheck CPT is 3.9  He also had an acute fall in the TCU.  No injuries he is attempting self transfer  Participation in therapy is limited  Unfortunately remains a very high fall risk.  Balance score is 13/28 indicating an extremely high fall risk  Blood pressures are  frequently noted to be on the low side.  He is no longer on amlodipine.  Lasix has been reduced to 20 mg.  He has lost 7 pounds in the TCU    Past Medical History     Active Ambulatory (Non-Hospital) Problems    Diagnosis   ? Cognitive and behavioral changes   ? Postoperative delirium   ? Sleep difficulties   ? Metabolic encephalopathy   ? Acute respiratory failure with hypoxia (H)   ? Coronary artery disease involving native coronary artery of native heart without angina pectoris   ? Hypertension, unspecified type   ? Mitral valve stenosis, unspecified etiology   ? Dyslipidemia   ? Abnormal electrocardiogram   ? Status post aortic valve replacement   ? Closed intertrochanteric fracture of left hip, initial encounter (H)   ? Hip fracture requiring operative repair, left, closed, initial encounter (H)   ? Closed displaced intertrochanteric fracture of left femur, initial encounter (H)   ? Chest pain   ? Prolonged Q-T interval on ECG   ? Nonrheumatic aortic valve stenosis   ? Non-STEMI (non-ST elevated myocardial infarction) (H)   ? DNAR (do not attempt resuscitation)   ? Acute non-ST elevation myocardial infarction (NSTEMI) (H)   ? Hypomagnesemia   ? Stage 3 chronic kidney disease   ? Neovascular glaucoma of left eye, severe stage   ? Primary open angle glaucoma of right eye, moderate stage   ? Mild aortic stenosis   ? Cervical kyphosis   ? Diabetic peripheral neuropathy associated with type 2 diabetes mellitus (H)   ? Glaucoma   ? Chronic anemia   ? Hyperlipidemia   ? Diabetes mellitus (H)   ? Essential hypertension     Past Medical History:   Diagnosis Date   ? Basal Cell Carcinoma Of The Skin Of The Trunk    ? Cataract 02/03/2016   ? Central retinal vein occlusion of left eye 02/03/2016   ? Chronic anemia 12/14/2014   ? Diabetes (H)    ? DNAR (do not attempt resuscitation)    ? Essential hypertension    ? Hyperlipidemia    ? Hypertension    ? Major depression in complete remission (H) 08/21/2019   ? Mild aortic  stenosis 03/29/2017   ? Nuclear senile cataract of left eye 03/08/2018   ? Retinal hemorrhage, left 02/03/2016   ? Stage 3 chronic kidney disease 08/21/2019       Past Social History     Reviewed, and he  reports that he quit smoking about 42 years ago. His smoking use included cigarettes. He has a 10.00 pack-year smoking history. He has never used smokeless tobacco. He reports current alcohol use. He reports that he does not use drugs.    Family History     Reviewed, and family history includes Diabetes in his father; Hypertension in his mother; No Medical Problems in his daughter, daughter, and daughter.    Medication List   Post Discharge Medication Reconciliation Status: discharge medications reconciled, continue medications without change   Current Outpatient Medications on File Prior to Visit   Medication Sig Dispense Refill   ? acetaminophen (TYLENOL) 325 MG tablet Take 3 tablets (975 mg total) by mouth every 8 (eight) hours.  0   ? amLODIPine (NORVASC) 5 MG tablet Take 1 tablet (5 mg total) by mouth daily.  0   ? aspirin 81 MG EC tablet Take 1 tablet (81 mg total) by mouth daily.  0   ? atorvastatin (LIPITOR) 40 MG tablet Take 40 mg by mouth at bedtime.      ? [START ON 6/17/2021] clopidogreL (PLAVIX) 75 mg tablet Take 1 tablet (75 mg total) by mouth daily. AFTER 6 WEEKS OF LOVENOX 1 tablet 0   ? enoxaparin ANTICOAGULANT (LOVENOX) 30 mg/0.3 mL syringe Inject 0.3 mL (30 mg total) under the skin daily. 35 Syringe 0   ? escitalopram oxalate (LEXAPRO) 5 MG tablet TAKE 1 TABLET BY MOUTH EVERY DAY 90 tablet 0   ? ferrous sulfate 325 (65 FE) MG tablet Take 1 tablet (325 mg total) by mouth every other day.  0   ? furosemide (LASIX) 40 MG tablet Take 1 tablet (40 mg total) by mouth daily.  0   ? gabapentin (NEURONTIN) 100 MG capsule Take 600 mg by mouth at bedtime.  0   ? glipiZIDE (GLUCOTROL) 5 MG tablet Take 5 mg by mouth daily.     ? isosorbide mononitrate (IMDUR) 30 MG 24 hr tablet Take 3 tablets (90 mg total) by  mouth daily. 90 tablet 0   ? latanoprost (XALATAN) 0.005 % ophthalmic solution Administer 1 drop to both eyes bedtime.     ? magnesium oxide 250 mg magnesium Tab Take 1 tablet (250 mg total) by mouth 2 (two) times a day.  0   ? melatonin 3 mg Tab tablet Take 1 tablet (3 mg total) by mouth at bedtime.  0   ? metFORMIN (GLUCOPHAGE) 500 MG tablet TAKE 2 TABLETS BY MOUTH TWICE A DAY WITH FOOD 360 tablet 1   ? metoprolol succinate (TOPROL-XL) 50 MG 24 hr tablet Take 50 mg by mouth daily.     ? mirtazapine (REMERON) 7.5 MG tablet Take 1 tablet (7.5 mg total) by mouth at bedtime.  0   ? nitroglycerin (NITROSTAT) 0.4 MG SL tablet Take 0.4 mg by mouth every 5 (five) minutes as needed for chest pain.     ? polyethylene glycol (MIRALAX) 17 gram packet Take 1 packet (17 g total) by mouth daily as needed.  0   ? potassium chloride (K-DUR,KLOR-CON) 20 MEQ tablet Take 20 mEq by mouth 2 (two) times a day.     ? QUEtiapine (SEROQUEL) 25 MG tablet Take 0.5 tablets (12.5 mg total) by mouth 2 (two) times a day as needed (agitation).  0   ? senna-docusate (PERICOLACE) 8.6-50 mg tablet Take 1 tablet by mouth 2 (two) times a day. 60 tablet 0   ? sodium chloride (OCEAN) 0.65 % nasal spray 1 spray into each nostril as needed for congestion.     ? tamsulosin (FLOMAX) 0.4 mg cap Take 1 capsule (0.4 mg total) by mouth daily.  0     No current facility-administered medications on file prior to visit.        Allergies     No Known Allergies    Review of Systems   A comprehensive review of 14 systems was done. Pertinent findings noted here and in history of present illness. All the rest negative.  Constitutional: Negative.  Negative for fever, chills, he has activity change, appetite change and fatigue.   Weight loss of 7 pounds since admission.  Oral intake though is more than 50% consistently  HENT: Negative for congestion and facial swelling.    Eyes: Negative for photophobia, redness and visual disturbance.   Respiratory: Negative for cough  and chest tightness.    Cardiovascular: Negative for chest pain, palpitations and leg swelling.   Gastrointestinal: Negative for nausea, diarrhea, constipation, blood in stool and abdominal distention.   Genitourinary: Negative.    Musculoskeletal: Negative.  Had an acute fall and this is suspected to be self transferring  Skin: Negative.    Neurological: Negative for dizziness, tremors, syncope, weakness, light-headedness and headaches.   Hematological: Does not bruise/bleed easily.   Psychiatric/Behavioral: Agitated repeatedly saying he wants to go home but not sure where his home is      Physical Exam     Recent Vitals 5/13/2021   Height -   Weight 134 lbs 10 oz   BSA (m2) 1.77 m2   /68   Pulse 82   Temp 98.2   Temp src -   SpO2 95   Some recent data might be hidden   Admission weight was 140 pounds today his weight is 133 pounds.  Blood pressure 105/60    Constitutional: Oriented to person,  cachectic and appears well-developed.   HEENT:  Normocephalic and atraumatic.  Eyes: Conjunctivae and EOM are normal. Pupils are equal, round, and reactive to light. No discharge.  No scleral icterus. Nose normal. Mouth/Throat: Oropharynx is clear and moist. No oropharyngeal exudate.    NECK: Normal range of motion. Neck supple. No JVD present. No tracheal deviation present. No thyromegaly present.   CARDIOVASCULAR: Normal rate, regular rhythm and intact distal pulses.  Exam reveals no gallop and no friction rub.  Systolic murmur present.  PULMONARY: Effort normal and breath sounds normal. No respiratory distress.No Wheezing or rales.  ABDOMEN: Soft. Bowel sounds are normal. No distension and no mass.  There is no tenderness. There is no rebound and no guarding. No HSM.  MUSCULOSKELETAL: Normal range of motion. No edema and no tenderness. Mild kyphosis, no tenderness.  L hip incision is intact  LYMPH NODES: Has no cervical, supraclavicular, axillary and groin adenopathy.   NEUROLOGICAL: Alert and oriented to persON. No  cranial nerve deficit.  Normal muscle tone. Coordination normal.   GENITOURINARY: Deferred exam.  SKIN: Skin is warm and dry. No rash noted. No erythema. No pallor.   EXTREMITIES: No cyanosis, no clubbing, no edema. No Deformity.  PSYCHIATRIC: Normal mood, affect and behavior.  Very agitated repeatedly requesting that he be discharged home.  Not alert and oriented with no recall      Lab Results     Results for orders placed or performed in visit on 05/18/21   Basic Metabolic Panel   Result Value Ref Range    Sodium 140 136 - 145 mmol/L    Potassium 3.6 3.5 - 5.0 mmol/L    Chloride 108 (H) 98 - 107 mmol/L    CO2 21 (L) 22 - 31 mmol/L    Anion Gap, Calculation 11 5 - 18 mmol/L    Glucose 173 (H) 70 - 125 mg/dL    Calcium 9.3 8.5 - 10.5 mg/dL    BUN 26 8 - 28 mg/dL    Creatinine 1.40 (H) 0.70 - 1.30 mg/dL    GFR MDRD Af Amer 57 (L) >60 mL/min/1.73m2    GFR MDRD Non Af Amer 47 (L) >60 mL/min/1.73m2     MEDICAL EQUIPMENT NEEDS:  walker     DISCHARGE PLAN/FACE TO FACE:  I certify that services are/were furnished while this patient was under the care of a physician and that a physician or an allowed non-physician practitioner (NPP), had a face-to-face encounter that meets the physician face-to-face encounter requirements. The encounter was in whole, or in part, related to the primary reason for home health. The patient is confined to his/her home and needs intermittent skilled nursing, physical therapy, speech-language pathology, or the continued need for occupational therapy. A plan of care has been established by a physician and is periodically reviewed by a physician.  Date of Face-to-Face Encounter: 6/1/21     I certify that, based on my findings, the following services are medically necessary home health services: [facility] HHA/RN and PT/OT to evaluate and treat    My clinical findings support the need for the above skilled services because: patient will be discharging to home. Patient will need assistance with medication  management and performing IADLs and ADLs effectively and safely.     Patient to re-establish plan of care with their PCP within 7 days after leaving TCU.    Total time spent is 35 minutes in this discharge planning including reviewing diabetic medications and adjusting medications pain management review as well as discharge planning and management of blood pressure and weights with patient being on a higher dose of Lasix.  He is advised close follow-up with his primary care physician on discharge      Electronically signed by  SUSAN Oropeza

## 2021-07-14 PROBLEM — I21.A1 TYPE 2 MYOCARDIAL INFARCTION (H): Status: RESOLVED | Noted: 2020-01-01 | Resolved: 2021-01-01

## 2021-07-14 PROBLEM — F32.5 MAJOR DEPRESSION IN COMPLETE REMISSION (H): Status: RESOLVED | Noted: 2019-08-21 | Resolved: 2020-01-01

## 2021-10-10 ENCOUNTER — HEALTH MAINTENANCE LETTER (OUTPATIENT)
Age: 86
End: 2021-10-10

## 2021-10-19 PROBLEM — F32.9 MAJOR DEPRESSION: Status: RESOLVED | Noted: 2019-08-21 | Resolved: 2020-01-01

## 2021-12-04 ENCOUNTER — HEALTH MAINTENANCE LETTER (OUTPATIENT)
Age: 86
End: 2021-12-04

## 2022-01-12 VITALS — BODY MASS INDEX: 19.22 KG/M2 | HEIGHT: 73 IN | WEIGHT: 145 LBS

## 2022-01-18 VITALS
OXYGEN SATURATION: 92 % | DIASTOLIC BLOOD PRESSURE: 78 MMHG | HEART RATE: 82 BPM | RESPIRATION RATE: 26 BRPM | TEMPERATURE: 97.6 F | WEIGHT: 148 LBS | BODY MASS INDEX: 21.24 KG/M2 | SYSTOLIC BLOOD PRESSURE: 150 MMHG

## 2022-01-18 VITALS
WEIGHT: 134.6 LBS | SYSTOLIC BLOOD PRESSURE: 145 MMHG | RESPIRATION RATE: 17 BRPM | BODY MASS INDEX: 17.76 KG/M2 | HEART RATE: 82 BPM | DIASTOLIC BLOOD PRESSURE: 68 MMHG | OXYGEN SATURATION: 95 % | TEMPERATURE: 98.2 F

## 2022-01-18 VITALS
TEMPERATURE: 97.8 F | OXYGEN SATURATION: 87 % | BODY MASS INDEX: 19.95 KG/M2 | SYSTOLIC BLOOD PRESSURE: 150 MMHG | HEART RATE: 55 BPM | RESPIRATION RATE: 16 BRPM | DIASTOLIC BLOOD PRESSURE: 80 MMHG | WEIGHT: 139.06 LBS

## 2022-01-18 VITALS
OXYGEN SATURATION: 93 % | BODY MASS INDEX: 20.04 KG/M2 | HEIGHT: 70 IN | SYSTOLIC BLOOD PRESSURE: 130 MMHG | DIASTOLIC BLOOD PRESSURE: 80 MMHG | WEIGHT: 140 LBS | HEART RATE: 64 BPM

## 2022-01-18 VITALS
DIASTOLIC BLOOD PRESSURE: 82 MMHG | HEIGHT: 70 IN | BODY MASS INDEX: 21.09 KG/M2 | TEMPERATURE: 97.8 F | SYSTOLIC BLOOD PRESSURE: 139 MMHG | HEART RATE: 60 BPM | DIASTOLIC BLOOD PRESSURE: 70 MMHG | OXYGEN SATURATION: 97 % | OXYGEN SATURATION: 96 % | WEIGHT: 140 LBS | HEART RATE: 59 BPM | RESPIRATION RATE: 24 BRPM | BODY MASS INDEX: 20.04 KG/M2 | SYSTOLIC BLOOD PRESSURE: 128 MMHG | WEIGHT: 147 LBS

## 2022-01-18 VITALS
DIASTOLIC BLOOD PRESSURE: 76 MMHG | RESPIRATION RATE: 18 BRPM | HEART RATE: 68 BPM | HEIGHT: 73 IN | BODY MASS INDEX: 18.42 KG/M2 | SYSTOLIC BLOOD PRESSURE: 124 MMHG | WEIGHT: 139 LBS

## 2022-01-18 ASSESSMENT — PATIENT HEALTH QUESTIONNAIRE - PHQ9: SUM OF ALL RESPONSES TO PHQ QUESTIONS 1-9: 0
